# Patient Record
Sex: FEMALE | Race: BLACK OR AFRICAN AMERICAN | NOT HISPANIC OR LATINO | Employment: UNEMPLOYED | URBAN - METROPOLITAN AREA
[De-identification: names, ages, dates, MRNs, and addresses within clinical notes are randomized per-mention and may not be internally consistent; named-entity substitution may affect disease eponyms.]

---

## 2017-08-11 ENCOUNTER — HOSPITAL ENCOUNTER (EMERGENCY)
Facility: OTHER | Age: 57
Discharge: HOME | End: 2017-08-11
Attending: EMERGENCY MEDICINE
Payer: MEDICAID

## 2017-08-11 VITALS
TEMPERATURE: 98 F | BODY MASS INDEX: 48.82 KG/M2 | DIASTOLIC BLOOD PRESSURE: 66 MMHG | RESPIRATION RATE: 14 BRPM | OXYGEN SATURATION: 99 % | WEIGHT: 293 LBS | HEIGHT: 65 IN | SYSTOLIC BLOOD PRESSURE: 153 MMHG | HEART RATE: 72 BPM

## 2017-08-11 DIAGNOSIS — S46.912A SHOULDER STRAIN, LEFT, INITIAL ENCOUNTER: Primary | ICD-10-CM

## 2017-08-11 DIAGNOSIS — I48.91 ATRIAL FIBRILLATION, UNSPECIFIED TYPE: ICD-10-CM

## 2017-08-11 DIAGNOSIS — M25.519 SHOULDER PAIN: ICD-10-CM

## 2017-08-11 PROCEDURE — 99284 EMERGENCY DEPT VISIT MOD MDM: CPT | Mod: 25

## 2017-08-11 PROCEDURE — 93005 ELECTROCARDIOGRAM TRACING: CPT

## 2017-08-11 PROCEDURE — 93010 ELECTROCARDIOGRAM REPORT: CPT | Mod: ,,, | Performed by: INTERNAL MEDICINE

## 2017-08-12 NOTE — ED PROVIDER NOTES
Encounter Date: 8/11/2017       History     Chief Complaint   Patient presents with    Shoulder Pain     c/o left shoulder pain radiation to back x1 day, reports picking heavy box up yesterday, pain started after such, denies SOB, nausea or vomiting or any other symptoms      Patient is 56-year-old female who presents with complaints of left shoulder pain isn't present for 2 days prior to arrival.  She reports pain radiates to her back and is worse with movements.  She reports pain started when she picked up a heavy box yesterday.  She denies associated shortness of breath or palpitations.  She has no radiation of pain to her anterior chest or otherwise.  She has not been taking any medications to help with her symptoms.  She denies specific trauma or injury.  She is currently unaccompanied here in the ER.          Review of patient's allergies indicates:   Allergen Reactions    Morphine Itching     Past Medical History:   Diagnosis Date    Depression     GERD (gastroesophageal reflux disease)     Hypertension     Medical history non-contributory      Past Surgical History:   Procedure Laterality Date    FINGER SURGERY Left     index fracture repair    KNEE ARTHROSCOPY      SHOULDER ARTHROSCOPY      SHOULDER SURGERY       Family History   Problem Relation Age of Onset    Stroke Mother      Social History   Substance Use Topics    Smoking status: Never Smoker    Smokeless tobacco: Never Used    Alcohol use No     Review of Systems   Constitutional: Negative for fever.   HENT: Negative for sore throat.    Respiratory: Negative for shortness of breath.    Cardiovascular: Negative for chest pain.   Gastrointestinal: Negative for nausea.   Genitourinary: Negative for dysuria.   Musculoskeletal: Negative for back pain.        Left shoulder pain    Skin: Negative for rash.   Neurological: Negative for weakness.   Hematological: Does not bruise/bleed easily.       Physical Exam     Initial Vitals [08/11/17  1909]   BP Pulse Resp Temp SpO2   135/60 95 14 98.3 °F (36.8 °C) 99 %      MAP       85         Physical Exam    Nursing note and vitals reviewed.  Constitutional: She appears well-developed and well-nourished. No distress.   Obese -American female in no acute distress or apparent pain.  She makes good eye contact, speaks in clear full sentences and ambulates with ease.   HENT:   Head: Normocephalic and atraumatic.   Eyes: Conjunctivae and EOM are normal. Pupils are equal, round, and reactive to light. Right eye exhibits no discharge. Left eye exhibits no discharge. No scleral icterus.   Neck: Normal range of motion.   Cardiovascular: Normal rate and normal heart sounds. Exam reveals no gallop and no friction rub.    No murmur heard.  Regularly irregular rhythm.   Pulmonary/Chest: Breath sounds normal. She has no wheezes. She has no rhonchi. She has no rales.   Abdominal: Soft. Bowel sounds are normal. There is no tenderness. There is no rebound and no guarding.   Musculoskeletal: Normal range of motion. She exhibits tenderness. She exhibits no edema.   There is left trapezius and cervical paraspinal musculature TTP with no Cervical midline bony tenderness crepitus or step-offs.    Lymphadenopathy:     She has no cervical adenopathy.   Neurological: She is alert and oriented to person, place, and time. She has normal strength. No cranial nerve deficit or sensory deficit.   Skin: Skin is warm. Capillary refill takes less than 2 seconds. No rash and no abscess noted. No erythema.   Psychiatric: She has a normal mood and affect. Her behavior is normal. Thought content normal.         ED Course   Procedures  Labs Reviewed - No data to display  EKG Readings: (Independently Interpreted)   Initial Reading: No STEMI. Previous EKG: Compared with most recent EKG Previous EKG Date: 2016. Rhythm: Atrial Fibrillation. Heart Rate: 83. Ectopy: No Ectopy. ST Segments: Normal ST Segments. T Waves: Normal. Axis: Normal.      Imaging Results          X-Ray Shoulder Trauma Left (Final result)  Result time 08/11/17 21:46:30    Final result by Annika Rizvi MD (08/11/17 21:46:30)                 Impression:      1.  No acute displaced fracture or dislocation of the left shoulder.      Electronically signed by: ANNIKA RIZVI MD  Date:     08/11/17  Time:    21:46              Narrative:    Shoulder trauma three-view    Clinical history: Pain and unspecified shoulder    Comparison: None    Findings:  3 views.    The left humeral head maintains anatomic relationship with the glenoid.  The acromioclavicular joint is intact noting degenerative changes.  No acute displaced fracture or dislocation of the shoulder.  No acute displaced left rib fracture.  The left lung zones are grossly clear.                                 Medical Decision Making:   ED Management:  Urgent evaluation of 56-year-old female who presents with complaints of left shoulder strain.  She is afebrile, nontoxic appearing, hemodynamically stable.  Physical exam reveals soft tissue tenderness to palpation with reproduction of pain on internal and external rotation.  There is no overlying skin changes and x-ray reveals no acute osseous process. EKG does reveal atrial fibrillation which we have not seen on prior EKG studies.  Patient tells me that she was recently diagnosed with atrial fibrillation by her primary care provider approximately 3 months ago.  She has seen a cardiologist in the last 2 months and was placed on aspirin therapy.  She has been compliant with all of her cardiologist recommendations and she reports no chest pain shortness of breath or palpitations associated with shoulder pain.  I do not feel that the shoulder pain is related to an acute cardiac pathology and I do not feel that urgent  Cardiology consult to further workup is warranted at this time.  We'll encourage patient to take her Mobitc which she is previously prescribed for arthritis for  the next few days and will provide some laying for comfort.  She is educated extensively on the importance of ranging the shoulder 4 times a day in order to prevent adhesive capsulitis.  She is encouraged to follow-up with orthopedic one to 2 days for symptom recheck.  She is amenable to plan.  Case discussed with attending who agrees with plan.  Other:   I have discussed this case with another health care provider.       <> Summary of the Discussion: Francisco J                   ED Course     Clinical Impression:   The primary encounter diagnosis was Shoulder strain, left, initial encounter. Diagnoses of Shoulder pain and Atrial fibrillation, unspecified type were also pertinent to this visit.                           Farrah Farmer PA-C  08/11/17 [id number]

## 2017-08-12 NOTE — ED NOTES
"Pt has pain to left shoulder starting yesterday, Pain is described as "achy" and radiates from shoulder blade and wraps around to front under breast. Pt states that she did fall and "twist herself" about 2 weeks ago that may be where she injured herself.  Pt has no decrease in ROM and shoulder blade is tender to palpation.  "

## 2021-01-16 ENCOUNTER — HOSPITAL ENCOUNTER (EMERGENCY)
Facility: OTHER | Age: 61
Discharge: HOME | End: 2021-01-16
Attending: EMERGENCY MEDICINE
Payer: MEDICAID

## 2021-01-16 VITALS
OXYGEN SATURATION: 98 % | RESPIRATION RATE: 16 BRPM | SYSTOLIC BLOOD PRESSURE: 164 MMHG | TEMPERATURE: 98 F | HEART RATE: 70 BPM | BODY MASS INDEX: 44.98 KG/M2 | DIASTOLIC BLOOD PRESSURE: 72 MMHG | HEIGHT: 65 IN | WEIGHT: 270 LBS

## 2021-01-16 DIAGNOSIS — Z87.19 HISTORY OF ESOPHAGITIS: ICD-10-CM

## 2021-01-16 DIAGNOSIS — R03.0 ELEVATED BLOOD PRESSURE READING: ICD-10-CM

## 2021-01-16 DIAGNOSIS — R07.89 CHEST TIGHTNESS: Primary | ICD-10-CM

## 2021-01-16 LAB
ALBUMIN SERPL BCP-MCNC: 3.5 G/DL (ref 3.5–5.2)
ALP SERPL-CCNC: 137 U/L (ref 55–135)
ALT SERPL W/O P-5'-P-CCNC: 15 U/L (ref 10–44)
ANION GAP SERPL CALC-SCNC: 10 MMOL/L (ref 8–16)
AST SERPL-CCNC: 25 U/L (ref 10–40)
BASOPHILS # BLD AUTO: 0.03 K/UL (ref 0–0.2)
BASOPHILS NFR BLD: 0.7 % (ref 0–1.9)
BILIRUB SERPL-MCNC: 0.7 MG/DL (ref 0.1–1)
BUN SERPL-MCNC: 9 MG/DL (ref 8–23)
CALCIUM SERPL-MCNC: 9 MG/DL (ref 8.7–10.5)
CHLORIDE SERPL-SCNC: 107 MMOL/L (ref 95–110)
CO2 SERPL-SCNC: 26 MMOL/L (ref 23–29)
CREAT SERPL-MCNC: 1.1 MG/DL (ref 0.5–1.4)
DIFFERENTIAL METHOD: ABNORMAL
EOSINOPHIL # BLD AUTO: 0.1 K/UL (ref 0–0.5)
EOSINOPHIL NFR BLD: 2.2 % (ref 0–8)
ERYTHROCYTE [DISTWIDTH] IN BLOOD BY AUTOMATED COUNT: 14.7 % (ref 11.5–14.5)
EST. GFR  (AFRICAN AMERICAN): >60 ML/MIN/1.73 M^2
EST. GFR  (NON AFRICAN AMERICAN): 54 ML/MIN/1.73 M^2
GLUCOSE SERPL-MCNC: 89 MG/DL (ref 70–110)
HCT VFR BLD AUTO: 39.3 % (ref 37–48.5)
HCV AB SERPL QL IA: NEGATIVE
HGB BLD-MCNC: 12.1 G/DL (ref 12–16)
HIV 1+2 AB+HIV1 P24 AG SERPL QL IA: NEGATIVE
IMM GRANULOCYTES # BLD AUTO: 0.01 K/UL (ref 0–0.04)
IMM GRANULOCYTES NFR BLD AUTO: 0.2 % (ref 0–0.5)
LYMPHOCYTES # BLD AUTO: 1.8 K/UL (ref 1–4.8)
LYMPHOCYTES NFR BLD: 45 % (ref 18–48)
MCH RBC QN AUTO: 23.1 PG (ref 27–31)
MCHC RBC AUTO-ENTMCNC: 30.8 G/DL (ref 32–36)
MCV RBC AUTO: 75 FL (ref 82–98)
MONOCYTES # BLD AUTO: 0.4 K/UL (ref 0.3–1)
MONOCYTES NFR BLD: 10.3 % (ref 4–15)
NEUTROPHILS # BLD AUTO: 1.7 K/UL (ref 1.8–7.7)
NEUTROPHILS NFR BLD: 41.6 % (ref 38–73)
NRBC BLD-RTO: 0 /100 WBC
PLATELET # BLD AUTO: 221 K/UL (ref 150–350)
PMV BLD AUTO: 10.2 FL (ref 9.2–12.9)
POTASSIUM SERPL-SCNC: 4.1 MMOL/L (ref 3.5–5.1)
PROT SERPL-MCNC: 7.1 G/DL (ref 6–8.4)
RBC # BLD AUTO: 5.23 M/UL (ref 4–5.4)
SODIUM SERPL-SCNC: 143 MMOL/L (ref 136–145)
TROPONIN I SERPL DL<=0.01 NG/ML-MCNC: <0.006 NG/ML (ref 0–0.03)
TROPONIN I SERPL DL<=0.01 NG/ML-MCNC: <0.006 NG/ML (ref 0–0.03)
WBC # BLD AUTO: 4.07 K/UL (ref 3.9–12.7)

## 2021-01-16 PROCEDURE — 86803 HEPATITIS C AB TEST: CPT

## 2021-01-16 PROCEDURE — 93010 EKG 12-LEAD: ICD-10-PCS | Mod: ,,, | Performed by: INTERNAL MEDICINE

## 2021-01-16 PROCEDURE — 93005 ELECTROCARDIOGRAM TRACING: CPT

## 2021-01-16 PROCEDURE — 86703 HIV-1/HIV-2 1 RESULT ANTBDY: CPT

## 2021-01-16 PROCEDURE — 85025 COMPLETE CBC W/AUTO DIFF WBC: CPT

## 2021-01-16 PROCEDURE — 93010 ELECTROCARDIOGRAM REPORT: CPT | Mod: ,,, | Performed by: INTERNAL MEDICINE

## 2021-01-16 PROCEDURE — 99285 EMERGENCY DEPT VISIT HI MDM: CPT | Mod: 25

## 2021-01-16 PROCEDURE — 80053 COMPREHEN METABOLIC PANEL: CPT

## 2021-01-16 PROCEDURE — 84484 ASSAY OF TROPONIN QUANT: CPT | Mod: 91

## 2021-01-16 RX ORDER — OMEGA-3-ACID ETHYL ESTERS 1 G/1
2 CAPSULE, LIQUID FILLED ORAL 2 TIMES DAILY
Status: ON HOLD | COMMUNITY
End: 2023-05-10

## 2021-01-16 RX ORDER — METOPROLOL SUCCINATE 25 MG/1
25 TABLET, EXTENDED RELEASE ORAL DAILY
COMMUNITY
End: 2022-08-16

## 2021-01-16 RX ORDER — ERGOCALCIFEROL 1.25 MG/1
50000 CAPSULE ORAL
COMMUNITY

## 2022-01-24 DIAGNOSIS — I50.42 CHRONIC COMBINED SYSTOLIC AND DIASTOLIC HEART FAILURE: Primary | ICD-10-CM

## 2022-02-11 ENCOUNTER — HOSPITAL ENCOUNTER (OUTPATIENT)
Dept: CARDIOLOGY | Facility: HOSPITAL | Age: 62
Discharge: HOME | End: 2022-02-11
Attending: INTERNAL MEDICINE
Payer: MEDICAID

## 2022-02-11 VITALS
DIASTOLIC BLOOD PRESSURE: 85 MMHG | WEIGHT: 270 LBS | BODY MASS INDEX: 44.98 KG/M2 | SYSTOLIC BLOOD PRESSURE: 130 MMHG | HEART RATE: 75 BPM | HEIGHT: 65 IN

## 2022-02-11 DIAGNOSIS — I50.42 CHRONIC COMBINED SYSTOLIC AND DIASTOLIC HEART FAILURE: ICD-10-CM

## 2022-02-11 PROBLEM — I10 HYPERTENSION: Status: ACTIVE | Noted: 2022-02-11

## 2022-02-11 PROBLEM — I48.0 PAROXYSMAL ATRIAL FIBRILLATION: Status: ACTIVE | Noted: 2022-02-11

## 2022-02-11 PROBLEM — E66.01 MORBID OBESITY: Status: ACTIVE | Noted: 2022-02-11

## 2022-02-11 PROBLEM — I34.0 NONRHEUMATIC MITRAL VALVE REGURGITATION: Status: ACTIVE | Noted: 2022-02-11

## 2022-02-11 PROBLEM — E78.5 HYPERLIPIDEMIA: Status: ACTIVE | Noted: 2022-02-11

## 2022-02-11 LAB
ASCENDING AORTA: 3.42 CM
AV INDEX (PROSTH): 0.91
AV MEAN GRADIENT: 2 MMHG
AV PEAK GRADIENT: 4 MMHG
AV VALVE AREA: 2.87 CM2
AV VELOCITY RATIO: 0.83
BSA FOR ECHO PROCEDURE: 2.37 M2
CV ECHO LV RWT: 0.35 CM
DOP CALC AO PEAK VEL: 1 M/S
DOP CALC AO VTI: 18.62 CM
DOP CALC LVOT AREA: 3.1 CM2
DOP CALC LVOT DIAMETER: 2 CM
DOP CALC LVOT PEAK VEL: 0.83 M/S
DOP CALC LVOT STROKE VOLUME: 53.44 CM3
DOP CALCLVOT PEAK VEL VTI: 17.02 CM
E/A RATIO: 2.33
E/E' RATIO: 18.11 M/S
ECHO LV POSTERIOR WALL: 0.95 CM (ref 0.6–1.1)
EJECTION FRACTION: 60 %
FRACTIONAL SHORTENING: 29 % (ref 28–44)
INTERVENTRICULAR SEPTUM: 0.99 CM (ref 0.6–1.1)
LA MAJOR: 8.79 CM
LA MINOR: 7.96 CM
LA WIDTH: 5.71 CM
LEFT ATRIUM SIZE: 6.6 CM
LEFT ATRIUM VOLUME INDEX MOD: 129.8 ML/M2
LEFT ATRIUM VOLUME INDEX: 118.9 ML/M2
LEFT ATRIUM VOLUME MOD: 292 CM3
LEFT ATRIUM VOLUME: 267.62 CM3
LEFT INTERNAL DIMENSION IN SYSTOLE: 3.88 CM (ref 2.1–4)
LEFT VENTRICLE DIASTOLIC VOLUME INDEX: 74.18 ML/M2
LEFT VENTRICLE DIASTOLIC VOLUME: 166.9 ML
LEFT VENTRICLE MASS INDEX: 91 G/M2
LEFT VENTRICLE SYSTOLIC VOLUME INDEX: 28.9 ML/M2
LEFT VENTRICLE SYSTOLIC VOLUME: 64.95 ML
LEFT VENTRICULAR INTERNAL DIMENSION IN DIASTOLE: 5.5 CM (ref 3.5–6)
LEFT VENTRICULAR MASS: 204.82 G
LV LATERAL E/E' RATIO: 16.3 M/S
LV SEPTAL E/E' RATIO: 20.38 M/S
MV PEAK A VEL: 0.7 M/S
MV PEAK E VEL: 1.63 M/S
PISA TR MAX VEL: 3.44 M/S
RA MAJOR: 7.31 CM
RA PRESSURE: 3 MMHG
RA WIDTH: 4.93 CM
RIGHT ATRIAL AREA: 25 CM2
RIGHT VENTRICULAR END-DIASTOLIC DIMENSION: 5.06 CM
RV TISSUE DOPPLER FREE WALL SYSTOLIC VELOCITY 1 (APICAL 4 CHAMBER VIEW): 8.99 CM/S
SINUS: 3.12 CM
STJ: 3.02 CM
TDI LATERAL: 0.1 M/S
TDI SEPTAL: 0.08 M/S
TDI: 0.09 M/S
TR MAX PG: 47 MMHG
TRICUSPID ANNULAR PLANE SYSTOLIC EXCURSION: 1.88 CM
TV REST PULMONARY ARTERY PRESSURE: 50 MMHG

## 2022-02-11 PROCEDURE — 93306 TTE W/DOPPLER COMPLETE: CPT

## 2022-02-11 PROCEDURE — 93306 ECHO (CUPID ONLY): ICD-10-PCS | Mod: 26,,, | Performed by: INTERNAL MEDICINE

## 2022-02-11 PROCEDURE — 93306 TTE W/DOPPLER COMPLETE: CPT | Mod: 26,,, | Performed by: INTERNAL MEDICINE

## 2022-02-11 NOTE — PROGRESS NOTES
PCP - Yonathan Brewer MD  Subjective:   Patient ID:  Magalys Nguyen is a 61 y.o. female who presents for  Evaluation of Mitral Valve Regurgitation.     Referring provider: Reese Edwards MD    HPI: Magalys Nguyen is a 61 y.o. F with chronic diastolic HF (EF 60%), pAF (on Eliquis), HTN, HLD, obesity s/p gastric sleeve (2017) who is referred for evaluation of severe MR. Patient is doing well on today's visit. She has known about her mitral valve disease for some time. She reports shortness of breath only while climbing stairs otherwise denies shortness of breath while ambulating on level ground. Does have peripheral edema. Further denies orthopnea, PND, palpitations, near syncope, syncope. She has never been hospitalized for CHF exacerbation. She exercises with foot pedal ~20 minutes per day. Does not routinely exercise otherwise. Wants to see Bariatrics to discuss possibility of re-do surgery.     Per chart review patient underwent CASTILLO in 2018 (prior to cardioversion for AF) which showed normal LVEF 55% and moderate to severe MR. She had 2D echo today with findings below.    2D echo 2/15/22: per report:  · The estimated ejection fraction is 60%.  · The left ventricle is normal in size with normal systolic function.  · Grade III left ventricular diastolic dysfunction.  · Normal right ventricular size with normal right ventricular systolic function.  · Severe left atrial enlargement.  · Moderate right atrial enlargement.  · Severe mitral regurgitation.  · Moderate to severe tricuspid regurgitation.  · There is pulmonary hypertension.  · The estimated PA systolic pressure is 50 mmHg.  · Normal central venous pressure (3 mmHg).       Magalys Nguyen is a 61 y.o. female referred by Dr Edwards for evaluation of severe MR (NYHA Class II sx).    Mitral Valve Disease Etiology: Functional Mitral Regurgitation (Pure annular dilation (w/normal LV systolic function))    The patient has undergone the following MitraClip  work-up:    CASTILLO (Date 1/31/22 ): CASTILLO from [facility] reviewed by Radhames Perdomo and Erick - shows moderate central MR with dilated annulus   LHC (Date ): Needs    STS: 1.7%    Frailty: 1/4    CT Surgery risk assessment: Needs   Comorbidities: HFpEF, pAF, HTN, HLD, obesity    Labs: Hgb 12.1, Cr 1.1,         History:     Past Medical History:   Diagnosis Date    Anticoagulant long-term use     Depression     GERD (gastroesophageal reflux disease)     Hypertension     Medical history non-contributory     Paroxysmal A-fib      Past Surgical History:   Procedure Laterality Date    FINGER SURGERY Left     index fracture repair    GALLBLADDER SURGERY  08/2021    KNEE ARTHROSCOPY      SHOULDER ARTHROSCOPY      SHOULDER SURGERY       Social History     Tobacco Use    Smoking status: Never Smoker    Smokeless tobacco: Never Used   Substance Use Topics    Alcohol use: No     Family History   Problem Relation Age of Onset    Stroke Mother        Meds:     Review of patient's allergies indicates:   Allergen Reactions    Morphine Itching       Current Outpatient Medications:     amlodipine (NORVASC) 5 MG tablet, Take 5 mg by mouth every evening. , Disp: , Rfl: 2    apixaban (ELIQUIS) 5 mg Tab, Take 5 mg by mouth 2 (two) times daily., Disp: , Rfl:     benazepril (LOTENSIN) 40 MG tablet, Take 40 mg by mouth every evening. , Disp: , Rfl:     ergocalciferol (ERGOCALCIFEROL) 50,000 unit Cap, Take 50,000 Units by mouth every 7 days., Disp: , Rfl:     fluticasone propionate (FLONASE) 50 mcg/actuation nasal spray, 1 spray by Each Nostril route once daily., Disp: , Rfl:     furosemide (LASIX) 20 MG tablet, Take 20 mg by mouth daily as needed., Disp: , Rfl:     hydrochlorothiazide (HYDRODIURIL) 25 MG tablet, Take 25 mg by mouth every evening. , Disp: , Rfl:     meloxicam (MOBIC) 15 MG tablet, Take 15 mg by mouth once daily., Disp: , Rfl:     metoprolol succinate (TOPROL-XL) 25 MG 24 hr tablet, Take 25 mg by  "mouth once daily., Disp: , Rfl:     omeprazole (PRILOSEC) 40 MG capsule, Take 1 capsule (40 mg total) by mouth every morning., Disp: 14 capsule, Rfl: 0    potassium chloride SA (K-DUR,KLOR-CON) 20 MEQ tablet, Take 20 mEq by mouth once daily., Disp: , Rfl:     aspirin (ECOTRIN) 81 MG EC tablet, Take 81 mg by mouth every evening., Disp: , Rfl:     famotidine (PEPCID) 20 MG tablet, Take 1 tablet (20 mg total) by mouth 2 (two) times daily. (Patient not taking: Reported on 2/15/2022), Disp: 28 tablet, Rfl: 0    omega-3 acid ethyl esters (LOVAZA) 1 gram capsule, Take 2 g by mouth 2 (two) times daily., Disp: , Rfl:       Review of Systems   Constitutional: Negative for chills, diaphoresis, fever, malaise/fatigue and weight loss.   HENT: Negative for nosebleeds and sinus pain.    Respiratory: Negative for cough, sputum production and shortness of breath.    Cardiovascular: Positive for leg swelling. Negative for chest pain, palpitations, orthopnea, claudication and PND.   Gastrointestinal: Negative for abdominal pain, blood in stool, constipation, diarrhea, nausea and vomiting.   Genitourinary: Negative for dysuria, frequency, hematuria and urgency.   Neurological: Negative for dizziness, loss of consciousness and weakness.   Psychiatric/Behavioral: Negative for depression. The patient is not nervous/anxious.        Objective:   BP (!) 142/90 (BP Location: Left arm, Patient Position: Sitting, BP Method: Large (Manual))   Pulse 70   Ht 5' 5" (1.651 m)   Wt 132.2 kg (291 lb 7.2 oz)   LMP 08/01/2014   SpO2 100%   BMI 48.50 kg/m²   Physical Exam  Constitutional:       General: She is not in acute distress.  HENT:      Head: Normocephalic and atraumatic.      Mouth/Throat:      Mouth: Oropharynx is clear and moist.   Eyes:      Extraocular Movements: EOM normal.      Conjunctiva/sclera: Conjunctivae normal.      Pupils: Pupils are equal, round, and reactive to light.   Neck:      Vascular: No JVD.   Cardiovascular:    "   Rate and Rhythm: Normal rate. Rhythm irregularly irregular.      Heart sounds: Murmur heard.   No friction rub. No gallop.    Pulmonary:      Effort: Pulmonary effort is normal. No respiratory distress.      Breath sounds: Normal breath sounds. No wheezing or rales.   Chest:      Chest wall: No tenderness.   Abdominal:      General: Bowel sounds are normal. There is no distension.      Palpations: Abdomen is soft.      Tenderness: There is no abdominal tenderness.   Musculoskeletal:         General: Normal range of motion.      Cervical back: Normal range of motion and neck supple.      Right lower leg: Edema present.      Left lower leg: Edema present.   Skin:     General: Skin is warm and dry.      Findings: No erythema.   Neurological:      Mental Status: She is alert and oriented to person, place, and time.   Psychiatric:         Mood and Affect: Mood and affect normal.         Cognition and Memory: Memory normal.         Judgment: Judgment normal.         Labs:     Lab Results   Component Value Date     02/15/2022    K 3.9 02/15/2022     02/15/2022    CO2 28 02/15/2022    BUN 11 02/15/2022    CREATININE 0.9 02/15/2022    ANIONGAP 7 (L) 02/15/2022     Lab Results   Component Value Date    HGBA1C 5.3 03/04/2020     Lab Results   Component Value Date     (H) 02/15/2022       Lab Results   Component Value Date    WBC 2.80 (L) 02/15/2022    HGB 11.4 (L) 02/15/2022    HCT 37.6 02/15/2022     02/15/2022    GRAN 1.3 (L) 02/15/2022    GRAN 45.3 02/15/2022     Lab Results   Component Value Date    CHOL 181 02/15/2022    HDL 60 02/15/2022    LDLCALC 98.8 02/15/2022    TRIG 111 02/15/2022       Lab Results   Component Value Date     02/15/2022    K 3.9 02/15/2022     02/15/2022    CO2 28 02/15/2022    BUN 11 02/15/2022    CREATININE 0.9 02/15/2022    ANIONGAP 7 (L) 02/15/2022     Lab Results   Component Value Date    HGBA1C 5.3 03/04/2020     Lab Results   Component Value Date    BNP  538 (H) 02/15/2022    Lab Results   Component Value Date    WBC 2.80 (L) 02/15/2022    HGB 11.4 (L) 02/15/2022    HCT 37.6 02/15/2022     02/15/2022    GRAN 1.3 (L) 02/15/2022    GRAN 45.3 02/15/2022     Lab Results   Component Value Date    CHOL 181 02/15/2022    HDL 60 02/15/2022    LDLCALC 98.8 02/15/2022    TRIG 111 02/15/2022            Cardiovascular Imaging:     CASTILLO 6/3/2018: per report:  Results:   Chamber sizes revealed Left Atrial Enlargment.   Mitral valve leaflets are thickened, with restricted posterior   Left ventricle reveals normal contractility.   Left ventricle ejection fraction is 55%.   Right ventricular function is normal.   Doppler reveals:   Moderate TR.   Moderate to severe Mitral Regurgitation was noted.   There was no pericardial effusion seen.   There was no significant visible echo contrast noted in left atrium and   left Atrial Appendage. Velocities in left atrial appendage were normal.   Successful Cardioversion to NSR was done.     CONCLUSION     Successful Cardioversion to NSR was done.     Moderate to Severe Mitral Regurgitation.         Assessment & Plan:     1- Nonrheumatic mitral valve regurgitation      Magalys Nguyen is a 61 y.o. female referred by Dr Edwards for evaluation of severe MR (NYHA Class II sx).    Mitral Valve Disease Etiology: Functional Mitral Regurgitation (Pure annular dilation (w/normal LV systolic function))    The patient has undergone the following MitraClip work-up:    CASTILLO (Date 1/31/22 ): [facility]ILLO from [facility] reviewed by Radhames Perdomo and Erick - shows moderate central MR with dilated annulus   LHC (Date ): Needs    STS: 1.7%    Frailty: 1/4    CT Surgery risk assessment: Needs   Comorbidities: HFpEF, pAF, HTN, HLD, obesity    Labs: Hgb 12.1, Cr 1.1,     Plan:  1. CTS referral to assess candidacy for mitral valve surgery.   2. Optimization of medical therapy as per Dr Matute of [facility],  seen by her today. Also r/o amyloidosis due to concerns for  restrictive heart disease on echo as per Dr Matute, appreciate assistance.  3. Obtain 48-hour Holter to assess arrhythmia burden and heart rate.   4. Will follow up with patient to determine candidacy for mitral valve clip procedure pending above w/ repeat TTE.     2- Chronic diastolic heart failure  - Patient reports NYHA class II symptoms. Continue medical therapy aimed at blood pressure control. Low sodium diet.     3- Atrial fibrillation  - Patient in atrial fibrillation today. [id number] 3. Continue Eliquis for CVA ppx. Will obtain 48-hour Holter to assess arrhythmia burden and assess heart rate trend.    4- Hypertension  - BP controlled. Continue current antihypertensive regimen.     5- Hyperlipidemia  - Continue lipid lowering therapy.     6- Morbid obesity  - Body mass index is 48.5 kg/m². Weight loss through diet and exercise.       RTC 1 month w/ repeat TTE    Discussed with Dr Perdomo    Signed:  Diana Fernandez M.D.  Interventional Cardiology Fellow PGY-7  [facility]           Interventional Cardiology Staff  I have personally taken the history and examined this patient. I have discussed and agree with the resident's findings and plan as documented in the resident's note.  Her 61-year-old, referred by Dr. Edwards to evaluate her severe mitral regurgitation.  Her transesophageal echo performed at outside hospital and transthoracic echo performed today demonstrates severe biatrial dilatation and enlargement severe mitral regurgitation and normal left and right ventricular systolic function.  She is symptomatic when walking 1 flight of stairs is on beta-blocker, Ace inhibitor and diuretics and has been seen by Dr. Mautte today who has recommended workup for restrictive disease.  I have ordered a 48 hour Holter monitor and surgical evaluation and will see her back in 1 month.  Her the as far as her mitral leaflets are concerned they are amendable to EVANGELISTA.    Isauro Perdomo

## 2022-02-15 ENCOUNTER — OFFICE VISIT (OUTPATIENT)
Dept: CARDIOLOGY | Facility: CLINIC | Age: 62
End: 2022-02-15
Payer: MEDICAID

## 2022-02-15 ENCOUNTER — OFFICE VISIT (OUTPATIENT)
Dept: TRANSPLANT | Facility: CLINIC | Age: 62
End: 2022-02-15
Payer: MEDICAID

## 2022-02-15 ENCOUNTER — LAB VISIT (OUTPATIENT)
Dept: LAB | Facility: HOSPITAL | Age: 62
End: 2022-02-15
Attending: INTERNAL MEDICINE
Payer: MEDICAID

## 2022-02-15 VITALS
BODY MASS INDEX: 48.63 KG/M2 | HEIGHT: 65 IN | HEART RATE: 64 BPM | WEIGHT: 291.88 LBS | SYSTOLIC BLOOD PRESSURE: 136 MMHG | DIASTOLIC BLOOD PRESSURE: 77 MMHG

## 2022-02-15 VITALS
HEART RATE: 70 BPM | WEIGHT: 291.44 LBS | BODY MASS INDEX: 48.55 KG/M2 | DIASTOLIC BLOOD PRESSURE: 90 MMHG | SYSTOLIC BLOOD PRESSURE: 142 MMHG | HEIGHT: 65 IN | OXYGEN SATURATION: 100 %

## 2022-02-15 DIAGNOSIS — E66.01 MORBID OBESITY: ICD-10-CM

## 2022-02-15 DIAGNOSIS — I50.32 CHRONIC DIASTOLIC HEART FAILURE: ICD-10-CM

## 2022-02-15 DIAGNOSIS — I10 HYPERTENSION, UNSPECIFIED TYPE: ICD-10-CM

## 2022-02-15 DIAGNOSIS — I48.0 PAROXYSMAL ATRIAL FIBRILLATION: ICD-10-CM

## 2022-02-15 DIAGNOSIS — E78.5 HYPERLIPIDEMIA, UNSPECIFIED HYPERLIPIDEMIA TYPE: ICD-10-CM

## 2022-02-15 DIAGNOSIS — I50.42 CHRONIC COMBINED SYSTOLIC AND DIASTOLIC HEART FAILURE: ICD-10-CM

## 2022-02-15 DIAGNOSIS — I34.0 NONRHEUMATIC MITRAL VALVE REGURGITATION: ICD-10-CM

## 2022-02-15 DIAGNOSIS — I34.0 NONRHEUMATIC MITRAL VALVE REGURGITATION: Primary | ICD-10-CM

## 2022-02-15 DIAGNOSIS — I36.1 NONRHEUMATIC TRICUSPID VALVE REGURGITATION: ICD-10-CM

## 2022-02-15 DIAGNOSIS — I34.0 SEVERE MITRAL REGURGITATION: Primary | ICD-10-CM

## 2022-02-15 DIAGNOSIS — I51.89 DIASTOLIC DYSFUNCTION: ICD-10-CM

## 2022-02-15 LAB
ALBUMIN SERPL BCP-MCNC: 3.2 G/DL (ref 3.5–5.2)
ALP SERPL-CCNC: 131 U/L (ref 55–135)
ALT SERPL W/O P-5'-P-CCNC: 19 U/L (ref 10–44)
ANION GAP SERPL CALC-SCNC: 7 MMOL/L (ref 8–16)
AST SERPL-CCNC: 20 U/L (ref 10–40)
BASOPHILS # BLD AUTO: 0.04 K/UL (ref 0–0.2)
BASOPHILS NFR BLD: 1.4 % (ref 0–1.9)
BILIRUB SERPL-MCNC: 1 MG/DL (ref 0.1–1)
BNP SERPL-MCNC: 538 PG/ML (ref 0–99)
BUN SERPL-MCNC: 11 MG/DL (ref 8–23)
CALCIUM SERPL-MCNC: 9.3 MG/DL (ref 8.7–10.5)
CHLORIDE SERPL-SCNC: 109 MMOL/L (ref 95–110)
CHOLEST SERPL-MCNC: 181 MG/DL (ref 120–199)
CHOLEST/HDLC SERPL: 3 {RATIO} (ref 2–5)
CO2 SERPL-SCNC: 28 MMOL/L (ref 23–29)
CREAT SERPL-MCNC: 0.9 MG/DL (ref 0.5–1.4)
DIFFERENTIAL METHOD: ABNORMAL
EOSINOPHIL # BLD AUTO: 0.1 K/UL (ref 0–0.5)
EOSINOPHIL NFR BLD: 3.9 % (ref 0–8)
ERYTHROCYTE [DISTWIDTH] IN BLOOD BY AUTOMATED COUNT: 15.5 % (ref 11.5–14.5)
EST. GFR  (AFRICAN AMERICAN): >60 ML/MIN/1.73 M^2
EST. GFR  (NON AFRICAN AMERICAN): >60 ML/MIN/1.73 M^2
GLUCOSE SERPL-MCNC: 92 MG/DL (ref 70–110)
HCT VFR BLD AUTO: 37.6 % (ref 37–48.5)
HDLC SERPL-MCNC: 60 MG/DL (ref 40–75)
HDLC SERPL: 33.1 % (ref 20–50)
HGB BLD-MCNC: 11.4 G/DL (ref 12–16)
IMM GRANULOCYTES # BLD AUTO: 0.01 K/UL (ref 0–0.04)
IMM GRANULOCYTES NFR BLD AUTO: 0.4 % (ref 0–0.5)
INR PPP: 1.1 (ref 0.8–1.2)
LDLC SERPL CALC-MCNC: 98.8 MG/DL (ref 63–159)
LYMPHOCYTES # BLD AUTO: 1 K/UL (ref 1–4.8)
LYMPHOCYTES NFR BLD: 36.1 % (ref 18–48)
MCH RBC QN AUTO: 22.9 PG (ref 27–31)
MCHC RBC AUTO-ENTMCNC: 30.3 G/DL (ref 32–36)
MCV RBC AUTO: 76 FL (ref 82–98)
MONOCYTES # BLD AUTO: 0.4 K/UL (ref 0.3–1)
MONOCYTES NFR BLD: 12.9 % (ref 4–15)
NEUTROPHILS # BLD AUTO: 1.3 K/UL (ref 1.8–7.7)
NEUTROPHILS NFR BLD: 45.3 % (ref 38–73)
NONHDLC SERPL-MCNC: 121 MG/DL
NRBC BLD-RTO: 0 /100 WBC
PLATELET # BLD AUTO: 191 K/UL (ref 150–450)
PMV BLD AUTO: 10.4 FL (ref 9.2–12.9)
POTASSIUM SERPL-SCNC: 3.9 MMOL/L (ref 3.5–5.1)
PROT SERPL-MCNC: 6.7 G/DL (ref 6–8.4)
PROTHROMBIN TIME: 11.1 SEC (ref 9–12.5)
RBC # BLD AUTO: 4.97 M/UL (ref 4–5.4)
SODIUM SERPL-SCNC: 144 MMOL/L (ref 136–145)
TRIGL SERPL-MCNC: 111 MG/DL (ref 30–150)
WBC # BLD AUTO: 2.8 K/UL (ref 3.9–12.7)

## 2022-02-15 PROCEDURE — 1160F RVW MEDS BY RX/DR IN RCRD: CPT | Mod: CPTII,,, | Performed by: INTERNAL MEDICINE

## 2022-02-15 PROCEDURE — 1159F MED LIST DOCD IN RCRD: CPT | Mod: CPTII,,, | Performed by: INTERNAL MEDICINE

## 2022-02-15 PROCEDURE — 3077F SYST BP >= 140 MM HG: CPT | Mod: CPTII,,, | Performed by: INTERNAL MEDICINE

## 2022-02-15 PROCEDURE — 1159F PR MEDICATION LIST DOCUMENTED IN MEDICAL RECORD: ICD-10-PCS | Mod: CPTII,,, | Performed by: INTERNAL MEDICINE

## 2022-02-15 PROCEDURE — 99205 PR OFFICE/OUTPT VISIT, NEW, LEVL V, 60-74 MIN: ICD-10-PCS | Mod: S$PBB,,, | Performed by: INTERNAL MEDICINE

## 2022-02-15 PROCEDURE — 3078F DIAST BP <80 MM HG: CPT | Mod: CPTII,,, | Performed by: INTERNAL MEDICINE

## 2022-02-15 PROCEDURE — 85610 PROTHROMBIN TIME: CPT | Performed by: INTERNAL MEDICINE

## 2022-02-15 PROCEDURE — 99205 OFFICE O/P NEW HI 60 MIN: CPT | Mod: S$PBB,,, | Performed by: INTERNAL MEDICINE

## 2022-02-15 PROCEDURE — 3008F BODY MASS INDEX DOCD: CPT | Mod: CPTII,,, | Performed by: INTERNAL MEDICINE

## 2022-02-15 PROCEDURE — 80061 LIPID PANEL: CPT | Performed by: INTERNAL MEDICINE

## 2022-02-15 PROCEDURE — 3075F SYST BP GE 130 - 139MM HG: CPT | Mod: CPTII,,, | Performed by: INTERNAL MEDICINE

## 2022-02-15 PROCEDURE — 99214 OFFICE O/P EST MOD 30 MIN: CPT | Mod: PBBFAC,27 | Performed by: INTERNAL MEDICINE

## 2022-02-15 PROCEDURE — 85025 COMPLETE CBC W/AUTO DIFF WBC: CPT | Performed by: INTERNAL MEDICINE

## 2022-02-15 PROCEDURE — 1160F PR REVIEW ALL MEDS BY PRESCRIBER/CLIN PHARMACIST DOCUMENTED: ICD-10-PCS | Mod: CPTII,,, | Performed by: INTERNAL MEDICINE

## 2022-02-15 PROCEDURE — 36415 COLL VENOUS BLD VENIPUNCTURE: CPT | Performed by: INTERNAL MEDICINE

## 2022-02-15 PROCEDURE — 99999 PR PBB SHADOW E&M-EST. PATIENT-LVL IV: CPT | Mod: PBBFAC,,, | Performed by: INTERNAL MEDICINE

## 2022-02-15 PROCEDURE — 3008F PR BODY MASS INDEX (BMI) DOCUMENTED: ICD-10-PCS | Mod: CPTII,,, | Performed by: INTERNAL MEDICINE

## 2022-02-15 PROCEDURE — 3080F DIAST BP >= 90 MM HG: CPT | Mod: CPTII,,, | Performed by: INTERNAL MEDICINE

## 2022-02-15 PROCEDURE — 3075F PR MOST RECENT SYSTOLIC BLOOD PRESS GE 130-139MM HG: ICD-10-PCS | Mod: CPTII,,, | Performed by: INTERNAL MEDICINE

## 2022-02-15 PROCEDURE — 99999 PR PBB SHADOW E&M-EST. PATIENT-LVL IV: ICD-10-PCS | Mod: PBBFAC,,, | Performed by: INTERNAL MEDICINE

## 2022-02-15 PROCEDURE — 80053 COMPREHEN METABOLIC PANEL: CPT | Performed by: INTERNAL MEDICINE

## 2022-02-15 PROCEDURE — 3080F PR MOST RECENT DIASTOLIC BLOOD PRESSURE >= 90 MM HG: ICD-10-PCS | Mod: CPTII,,, | Performed by: INTERNAL MEDICINE

## 2022-02-15 PROCEDURE — 3078F PR MOST RECENT DIASTOLIC BLOOD PRESSURE < 80 MM HG: ICD-10-PCS | Mod: CPTII,,, | Performed by: INTERNAL MEDICINE

## 2022-02-15 PROCEDURE — 99999 PR PBB SHADOW E&M-EST. PATIENT-LVL III: ICD-10-PCS | Mod: PBBFAC,,, | Performed by: INTERNAL MEDICINE

## 2022-02-15 PROCEDURE — 99999 PR PBB SHADOW E&M-EST. PATIENT-LVL III: CPT | Mod: PBBFAC,,, | Performed by: INTERNAL MEDICINE

## 2022-02-15 PROCEDURE — 83880 ASSAY OF NATRIURETIC PEPTIDE: CPT | Performed by: INTERNAL MEDICINE

## 2022-02-15 PROCEDURE — 3077F PR MOST RECENT SYSTOLIC BLOOD PRESSURE >= 140 MM HG: ICD-10-PCS | Mod: CPTII,,, | Performed by: INTERNAL MEDICINE

## 2022-02-15 PROCEDURE — 99213 OFFICE O/P EST LOW 20 MIN: CPT | Mod: PBBFAC | Performed by: INTERNAL MEDICINE

## 2022-02-15 RX ORDER — POTASSIUM CHLORIDE 20 MEQ/1
20 TABLET, EXTENDED RELEASE ORAL DAILY
Status: ON HOLD | COMMUNITY
End: 2022-08-16 | Stop reason: HOSPADM

## 2022-02-15 RX ORDER — MELOXICAM 15 MG/1
15 TABLET ORAL DAILY
Status: ON HOLD | COMMUNITY
Start: 2022-01-26 | End: 2022-04-13 | Stop reason: HOSPADM

## 2022-02-15 RX ORDER — FLUTICASONE PROPIONATE 50 MCG
1 SPRAY, SUSPENSION (ML) NASAL DAILY
COMMUNITY
Start: 2021-09-18

## 2022-02-15 RX ORDER — FUROSEMIDE 20 MG/1
20 TABLET ORAL DAILY PRN
Status: ON HOLD | COMMUNITY
Start: 2022-01-26 | End: 2022-08-16 | Stop reason: HOSPADM

## 2022-02-15 NOTE — PROGRESS NOTES
Subjective:   Initial evaluation of mitral valve disease    HPI:  Ms. Nguyen is a 61 y.o. year old Black or  female who has presents to be considered for mitral valve regurgitation. She is a 60 year old BF with AF, diastolic heart failure, GERD, s/p gastric sleeve but gained weight back again, severe MR, who presents for evaluation of her valve. She is scheduled for another sleeve which is what started this evaluation again. She is followed by Dr. Edwards from cardiology here in [place]. Had a stress test last year which was negative for ischemia. No chest pain, chest pressure, not walking as much as she used to over the last year. Talked about her valve issue before covid (the pandemic). Was considering referring back in 2020 for the valve. Review of the echo demosntrates significant diastolic dysfunction with significant biatrial enlargement. Sleeps on 1 pillow, sometimes two. No PND. Has broken her left foot, so swells in that one, but not typically. No snoring at night, feels tired sometimes but not fatigued during the day really. Not been evaluated for sleep apnea. About a year ago, was walking nearly 2 miles, but not walking anymore. With the afib had been found out to have valve issue, but feels she was pretty asymptomatic. NYHA FC II. Blood pressure stable at home, usually 110-120s systolic at home, but at the doctor feels always at the doctor. Thinks she can walk 1/4 mile without issue. Really feels well, does not feel short of breath with walking.     TTE today:  · The estimated ejection fraction is 60%.  · The left ventricle is normal in size with normal systolic function.  · Grade III left ventricular diastolic dysfunction.  · Normal right ventricular size with normal right ventricular systolic function.  · Severe left atrial enlargement.  · Moderate right atrial enlargement.  · Severe mitral regurgitation.  · Moderate to severe tricuspid regurgitation.  · There is pulmonary  "hypertension.  · The estimated PA systolic pressure is 50 mmHg.  · Normal central venous pressure (3 mmHg).      Past Medical History:   Diagnosis Date    Anticoagulant long-term use     Depression     GERD (gastroesophageal reflux disease)     Hypertension     Medical history non-contributory     Paroxysmal A-fib      Past Surgical History:   Procedure Laterality Date    FINGER SURGERY Left     index fracture repair    KNEE ARTHROSCOPY      SHOULDER ARTHROSCOPY      SHOULDER SURGERY         Family History   Problem Relation Age of Onset    Stroke Mother        Review of Systems   Constitutional: Negative for chills, decreased appetite, diaphoresis, fever, malaise/fatigue, weight gain and weight loss.   HENT: Negative for congestion.    Eyes: Negative for blurred vision and visual disturbance.   Cardiovascular: Positive for dyspnea on exertion and leg swelling. Negative for chest pain, irregular heartbeat, near-syncope, orthopnea, palpitations, paroxysmal nocturnal dyspnea and syncope.   Respiratory: Negative for cough, shortness of breath, sleep disturbances due to breathing, snoring and wheezing.    Hematologic/Lymphatic: Negative for bleeding problem. Does not bruise/bleed easily.   Skin: Negative for poor wound healing and rash.   Musculoskeletal: Negative for arthritis, joint pain and muscle weakness.   Gastrointestinal: Negative for bloating, abdominal pain, anorexia, constipation, diarrhea, hematemesis, hematochezia, melena, nausea and vomiting.   Genitourinary: Negative for frequency and urgency.   Neurological: Negative for difficulty with concentration, excessive daytime sleepiness, dizziness, headaches, light-headedness and weakness.   Psychiatric/Behavioral: Negative for depression. The patient does not have insomnia and is not nervous/anxious.        Objective:   Blood pressure 136/77, pulse 64, height 5' 5" (1.651 m), weight 132.4 kg (291 lb 14.2 oz), last menstrual period 08/01/2014.body " mass index is 48.57 kg/m².    Physical Exam  Vitals and nursing note reviewed.   Constitutional:       General: She is not in acute distress.     Appearance: She is well-developed and well-nourished. She is obese. She is not diaphoretic.   HENT:      Head: Normocephalic and atraumatic.      Mouth/Throat:      Mouth: Oropharynx is clear and moist.   Eyes:      General:         Right eye: No discharge.         Left eye: No discharge.      Extraocular Movements: EOM normal.      Conjunctiva/sclera: Conjunctivae normal.   Neck:      Vascular: No JVD.   Cardiovascular:      Rate and Rhythm: Normal rate and regular rhythm.      Pulses: Intact distal pulses.      Heart sounds: Murmur heard.   No friction rub. No gallop.       Comments: JVP around 9cm H2O, no HJR  Pulmonary:      Effort: Pulmonary effort is normal.      Breath sounds: Normal breath sounds. No wheezing or rales.   Chest:      Chest wall: No tenderness.   Abdominal:      General: Bowel sounds are normal. There is no distension.      Palpations: Abdomen is soft. There is no mass.      Tenderness: There is no abdominal tenderness. There is no guarding or rebound.   Musculoskeletal:         General: No tenderness or edema. Normal range of motion.      Cervical back: Normal range of motion and neck supple.   Skin:     General: Skin is warm and dry.      Findings: No erythema or rash.   Neurological:      Mental Status: She is alert and oriented to person, place, and time.   Psychiatric:         Mood and Affect: Mood and affect normal.         Behavior: Behavior normal.         Thought Content: Thought content normal.         Judgment: Judgment normal.         Labs:      Chemistry        Component Value Date/Time     02/15/2022 0843    K 3.9 02/15/2022 0843     02/15/2022 0843    CO2 28 02/15/2022 0843    BUN 11 02/15/2022 0843    CREATININE 0.9 02/15/2022 0843    GLU 92 02/15/2022 0843        Component Value Date/Time    CALCIUM 9.3 02/15/2022 0843     ALKPHOS 131 02/15/2022 0843    AST 20 02/15/2022 0843    ALT 19 02/15/2022 0843    BILITOT 1.0 02/15/2022 0843    ESTGFRAFRICA >60.0 02/15/2022 0843    EGFRNONAA >60.0 02/15/2022 0843          No results found for: MG  Lab Results   Component Value Date    WBC 2.80 (L) 02/15/2022    HGB 11.4 (L) 02/15/2022    HCT 37.6 02/15/2022    MCV 76 (L) 02/15/2022     02/15/2022     BNP   Date Value Ref Range Status   02/15/2022 538 (H) 0 - 99 pg/mL Final     Comment:     Values of less than 100 pg/ml are consistent with non-CHF populations.     No results found for this or any previous visit.      Labs were reviewed with the patient.    Assessment:      1. Severe mitral regurgitation    2. Chronic combined systolic and diastolic heart failure    3. Diastolic dysfunction    4. Chronic diastolic heart failure    5. Nonrheumatic mitral valve regurgitation    6. Hypertension, unspecified type    7. Paroxysmal atrial fibrillation    8. Morbid obesity    9. Nonrheumatic tricuspid valve regurgitation      Plan:   Reviewed echo, wonder about amyloidosis with grade 3 diastolic dysfunction and biatrial enlargement. Strain not done will see if they can add it on.   Patient is not very symptomatic from her valvular heart disease, not sure how this plays a role.  That being said, blood pressure at home better controlled, for diastolic dysfunction, is on appropriate medication. Would consider addition of aldactone in setting of diastolic dysfunction, will send copy of this note to Dr. Edwards as well.   Has some anemia, may be in setting of gastric sleeve, however not had iron deficiency evaluated.  Recommend weight loss, patient states gastric sleeve being considered again.   afib- HR controlled at 64.   Patient is now NYHA II ACC stage B  No symptoms of sleep apnea but could consider this addition as well.   Will go ahead and obtain some basic amyloid studies, if indication to, will proceed with PYP scan.   Recommend 2 gram sodium  restriction and 1500cc fluid restriction.  Encourage physical activity with graded exercise program.  Requested patient to weigh themselves daily, and to notify us if their weight increases by more than 3 lbs in 1 day or 5 lbs in 1 week.       Winifred Matute MD

## 2022-02-16 ENCOUNTER — CLINICAL SUPPORT (OUTPATIENT)
Dept: CARDIOLOGY | Facility: HOSPITAL | Age: 62
End: 2022-02-16
Attending: INTERNAL MEDICINE
Payer: MEDICAID

## 2022-02-16 DIAGNOSIS — I34.0 NONRHEUMATIC MITRAL VALVE REGURGITATION: Primary | ICD-10-CM

## 2022-02-16 DIAGNOSIS — I34.0 NONRHEUMATIC MITRAL VALVE REGURGITATION: ICD-10-CM

## 2022-02-16 PROCEDURE — 93225 XTRNL ECG REC<48 HRS REC: CPT

## 2022-02-16 PROCEDURE — 93227 HOLTER MONITOR - 48 HOUR (CUPID ONLY): ICD-10-PCS | Mod: ,,, | Performed by: INTERNAL MEDICINE

## 2022-02-16 PROCEDURE — 93227 XTRNL ECG REC<48 HR R&I: CPT | Mod: ,,, | Performed by: INTERNAL MEDICINE

## 2022-02-17 DIAGNOSIS — I50.42 CHRONIC COMBINED SYSTOLIC AND DIASTOLIC HEART FAILURE: Primary | ICD-10-CM

## 2022-02-22 ENCOUNTER — OFFICE VISIT (OUTPATIENT)
Dept: CARDIOTHORACIC SURGERY | Facility: CLINIC | Age: 62
End: 2022-02-22
Payer: MEDICAID

## 2022-02-22 VITALS
HEIGHT: 65 IN | DIASTOLIC BLOOD PRESSURE: 66 MMHG | WEIGHT: 287.94 LBS | SYSTOLIC BLOOD PRESSURE: 142 MMHG | HEART RATE: 70 BPM | OXYGEN SATURATION: 99 % | BODY MASS INDEX: 47.97 KG/M2

## 2022-02-22 DIAGNOSIS — I34.0 NONRHEUMATIC MITRAL VALVE REGURGITATION: ICD-10-CM

## 2022-02-22 DIAGNOSIS — I36.1 NONRHEUMATIC TRICUSPID VALVE REGURGITATION: ICD-10-CM

## 2022-02-22 DIAGNOSIS — I48.0 PAROXYSMAL ATRIAL FIBRILLATION: Primary | ICD-10-CM

## 2022-02-22 LAB
OHS CV EVENT MONITOR DAY: 0
OHS CV HOLTER LENGTH DECIMAL HOURS: 48
OHS CV HOLTER LENGTH HOURS: 48
OHS CV HOLTER LENGTH MINUTES: 0

## 2022-02-22 PROCEDURE — 1159F MED LIST DOCD IN RCRD: CPT | Mod: CPTII,,, | Performed by: THORACIC SURGERY (CARDIOTHORACIC VASCULAR SURGERY)

## 2022-02-22 PROCEDURE — 3077F PR MOST RECENT SYSTOLIC BLOOD PRESSURE >= 140 MM HG: ICD-10-PCS | Mod: CPTII,,, | Performed by: THORACIC SURGERY (CARDIOTHORACIC VASCULAR SURGERY)

## 2022-02-22 PROCEDURE — 3008F PR BODY MASS INDEX (BMI) DOCUMENTED: ICD-10-PCS | Mod: CPTII,,, | Performed by: THORACIC SURGERY (CARDIOTHORACIC VASCULAR SURGERY)

## 2022-02-22 PROCEDURE — 99205 OFFICE O/P NEW HI 60 MIN: CPT | Mod: S$PBB,,, | Performed by: THORACIC SURGERY (CARDIOTHORACIC VASCULAR SURGERY)

## 2022-02-22 PROCEDURE — 3008F BODY MASS INDEX DOCD: CPT | Mod: CPTII,,, | Performed by: THORACIC SURGERY (CARDIOTHORACIC VASCULAR SURGERY)

## 2022-02-22 PROCEDURE — 3078F PR MOST RECENT DIASTOLIC BLOOD PRESSURE < 80 MM HG: ICD-10-PCS | Mod: CPTII,,, | Performed by: THORACIC SURGERY (CARDIOTHORACIC VASCULAR SURGERY)

## 2022-02-22 PROCEDURE — 99999 PR PBB SHADOW E&M-EST. PATIENT-LVL III: ICD-10-PCS | Mod: PBBFAC,,, | Performed by: THORACIC SURGERY (CARDIOTHORACIC VASCULAR SURGERY)

## 2022-02-22 PROCEDURE — 3078F DIAST BP <80 MM HG: CPT | Mod: CPTII,,, | Performed by: THORACIC SURGERY (CARDIOTHORACIC VASCULAR SURGERY)

## 2022-02-22 PROCEDURE — 99205 PR OFFICE/OUTPT VISIT, NEW, LEVL V, 60-74 MIN: ICD-10-PCS | Mod: S$PBB,,, | Performed by: THORACIC SURGERY (CARDIOTHORACIC VASCULAR SURGERY)

## 2022-02-22 PROCEDURE — 3077F SYST BP >= 140 MM HG: CPT | Mod: CPTII,,, | Performed by: THORACIC SURGERY (CARDIOTHORACIC VASCULAR SURGERY)

## 2022-02-22 PROCEDURE — 1159F PR MEDICATION LIST DOCUMENTED IN MEDICAL RECORD: ICD-10-PCS | Mod: CPTII,,, | Performed by: THORACIC SURGERY (CARDIOTHORACIC VASCULAR SURGERY)

## 2022-02-22 PROCEDURE — 99213 OFFICE O/P EST LOW 20 MIN: CPT | Mod: PBBFAC | Performed by: THORACIC SURGERY (CARDIOTHORACIC VASCULAR SURGERY)

## 2022-02-22 PROCEDURE — 99999 PR PBB SHADOW E&M-EST. PATIENT-LVL III: CPT | Mod: PBBFAC,,, | Performed by: THORACIC SURGERY (CARDIOTHORACIC VASCULAR SURGERY)

## 2022-02-22 NOTE — PROGRESS NOTES
Subjective:      Patient ID: Magalys Nguyen is a 61 y.o. female.    Chief Complaint: No chief complaint on file.      HPI:  Magalys Nguyen is a 61 y.o. female who presents for surgical evaluation of MR. Medical conditions include chronic diastolic HF (EF 60%), pAF (on Eliquis), HTN, HLD, obesity s/p gastric sleeve (2017). Saw Dr. Matute who has concerns for restrictive disease / amyloid due to echo findings. Patient states that she was diagnosed with a heart murmur ~ 2 years ago. States that she is not really SOB when walking on a flat surface but more so with stairs. States that she used to walk for exercise but has not been doing that so feels deconditioning also plays a part in her breathing issues. Intermittent swelling in lower extremities. Reports energy level is not what it used to be. Denies any dizziness, orthopnea, chest pain, palpitations. No prior strokes, seizures, blood clots, stents, or sternotomies. Quit smoking 30 years ago. No significant drinking history.     Mitral Valve Disease Etiology: Functional Mitral Regurgitation (Pure annular dilation (w/normal LV systolic function))     The patient has undergone the following MitraClip work-up:   · CASTILLO (Date 1/31/22 ): CASTILLO from [facility] reviewed by Radhames Perdomo and Erick - shows moderate central MR with dilated annulus  · LHC (Date ): Needs   · STS: 1.7%   · Frailty: 1/4   · CT Surgery risk assessment: Needs  · Comorbidities: HFpEF, pAF, HTN, HLD, obesity   · Labs: Hgb 12.1, Cr 1.1,     Family and social history reviewed    Review of patient's allergies indicates:   Allergen Reactions    Morphine Itching     Past Medical History:   Diagnosis Date    Anticoagulant long-term use     Depression     GERD (gastroesophageal reflux disease)     Hypertension     Medical history non-contributory     Paroxysmal A-fib      Past Surgical History:   Procedure Laterality Date    FINGER SURGERY Left     index fracture repair    GALLBLADDER SURGERY  08/2021     KNEE ARTHROSCOPY      SHOULDER ARTHROSCOPY      SHOULDER SURGERY       Family History     Problem Relation (Age of Onset)    Stroke Mother        Social History     Socioeconomic History    Marital status:    Tobacco Use    Smoking status: Never Smoker    Smokeless tobacco: Never Used   Substance and Sexual Activity    Alcohol use: No    Drug use: No   Social History Narrative    ** Merged History Encounter **            Current medications Reviewed    Review of Systems   Constitutional: Positive for activity change and fatigue.   HENT: Negative for nosebleeds.    Eyes: Negative for visual disturbance.   Respiratory: Positive for shortness of breath.    Cardiovascular: Positive for leg swelling. Negative for chest pain and palpitations.   Gastrointestinal: Negative for nausea.   Musculoskeletal: Negative for gait problem.   Skin: Negative for color change.   Neurological: Negative for dizziness and seizures.   Hematological: Does not bruise/bleed easily.   Psychiatric/Behavioral: Negative for sleep disturbance.     Objective:   Physical Exam  Constitutional:       General: She is not in acute distress.     Appearance: She is obese.   HENT:      Head: Normocephalic and atraumatic.   Eyes:      Pupils: Pupils are equal, round, and reactive to light.   Cardiovascular:      Rate and Rhythm: Normal rate.      Heart sounds: Murmur heard.   Pulmonary:      Effort: Pulmonary effort is normal. No respiratory distress.   Abdominal:      General: There is no distension.   Musculoskeletal:         General: Swelling present. Normal range of motion.      Cervical back: Normal range of motion.   Skin:     General: Skin is warm and dry.      Coloration: Skin is not pale.   Neurological:      General: No focal deficit present.      Mental Status: She is alert.   Psychiatric:         Mood and Affect: Mood normal.         Behavior: Behavior normal.         Thought Content: Thought content normal.         Judgment:  Judgment normal.         Diagnostic Results: reviewed   TTE 2/11/22  · The estimated ejection fraction is 60%.  · The left ventricle is normal in size with normal systolic function.  · Grade III left ventricular diastolic dysfunction.  · Normal right ventricular size with normal right ventricular systolic function.  · Severe left atrial enlargement.  · Moderate right atrial enlargement.  · Severe mitral regurgitation.  · Moderate to severe tricuspid regurgitation.  · There is pulmonary hypertension.  · The estimated PA systolic pressure is 50 mmHg.  · Normal central venous pressure (3 mmHg).  · LV 5.5  · TAPSE 1.8    Assessment:   MR   Afib   Plan:     CTS Attending Note:    I have personally taken the history and examined this patient and agree with the VIDA's note as stated above.  Very pleasant 61-year-old woman with severe mitral regurgitation.  She also has atrial fibrillation as well as moderate to severe tricuspid regurgitation.  She has pulmonary hypertension.  Surprisingly, she is minimally symptomatic.  With the presence of pulmonary hypertension and atrial fibrillation, she meets criteria to have her mitral valve addressed.  She reports that she remains fairly active, and so I believe from a functional status standpoint she would be a reasonable surgical candidate.  Surgery may be the best option for her, as we would be able to address both her tricuspid regurgitation as well as her atrial fibrillation at the time of mitral valve surgery.  However, I am concerned about the question of amyloidosis.  If she does have cardiac amyloid, then surgical correction of her valvular heart disease would be ill-advised.  We will await her amyloid evaluation, and then doctors Conor and Juju and I will formulate a plan.

## 2022-03-12 NOTE — H&P (VIEW-ONLY)
Interventional Cardiology Clinic Note  Reason for Visit: severe MR    HPI:   61 y.o. F with chronic diastolic HF (EF 60%), persistent AF (on Eliquis), HTN, HLD, obesity s/p gastric sleeve (2017) who is referred for evaluation of severe MR    Last saw Dr. Almanza's on 2/15/22.  The workup is below.  Saw CTS on 2/22/22 and Dr. Morris recommended surgery to be able to correct both the the TR and MR however is concerned about the possibility of amyloidosis in which catheter based approach would be better.  Saw Dr. Matute and PYP currently pending.      Magalys Nguyen is a 61 y.o. female referred by Dr Edwards for evaluation of severe MR (NYHA Class II sx).     Mitral Valve Disease Etiology: Functional Mitral Regurgitation (Pure annular dilation (w/normal LV systolic function))     The patient has undergone the following MitraClip work-up:   · CASTILLO (Date 1/31/22 ): CASTILLO from [facility] reviewed by Radhames Perdomo and Erick - shows moderate central MR with dilated annulus  · LHC (Date ): Needs   · STS: 1.7%   · Frailty: 1/4   · CT Surgery risk assessment: Needs  · Comorbidities: HFpEF, pAF, HTN, HLD, obesity   · Labs: Hgb 12.1, Cr 1.1,       ROS:    Constitution: Negative for fever, chills, weight loss or gain.   HENT: Negative for sore throat, rhinorrhea, or headache.  Eyes: Negative for blurred or double vision.   Cardiovascular: See above  Pulmonary: Negative for SOB   Gastrointestinal: Negative for abdominal pain, nausea, vomiting, or diarrhea.   : Negative for dysuria.   Neurological: Negative for focal weakness or sensory changes.  PMH:     Past Medical History:   Diagnosis Date    Anticoagulant long-term use     Depression     GERD (gastroesophageal reflux disease)     Hypertension     Medical history non-contributory     Paroxysmal A-fib      Past Surgical History:   Procedure Laterality Date    FINGER SURGERY Left     index fracture repair    GALLBLADDER SURGERY  08/2021    KNEE ARTHROSCOPY       SHOULDER ARTHROSCOPY      SHOULDER SURGERY       Allergies:     Review of patient's allergies indicates:   Allergen Reactions    Morphine Itching     Medications:     Current Outpatient Medications on File Prior to Visit   Medication Sig Dispense Refill    amlodipine (NORVASC) 5 MG tablet Take 5 mg by mouth every evening.   2    apixaban (ELIQUIS) 5 mg Tab Take 5 mg by mouth 2 (two) times daily.      aspirin (ECOTRIN) 81 MG EC tablet Take 81 mg by mouth every evening.      benazepril (LOTENSIN) 40 MG tablet Take 40 mg by mouth every evening.       ergocalciferol (ERGOCALCIFEROL) 50,000 unit Cap Take 50,000 Units by mouth every 7 days.      famotidine (PEPCID) 20 MG tablet Take 1 tablet (20 mg total) by mouth 2 (two) times daily. (Patient not taking: Reported on 2/15/2022) 28 tablet 0    fluticasone propionate (FLONASE) 50 mcg/actuation nasal spray 1 spray by Each Nostril route once daily.      furosemide (LASIX) 20 MG tablet Take 20 mg by mouth daily as needed.      hydrochlorothiazide (HYDRODIURIL) 25 MG tablet Take 25 mg by mouth every evening.       meloxicam (MOBIC) 15 MG tablet Take 15 mg by mouth once daily.      metoprolol succinate (TOPROL-XL) 25 MG 24 hr tablet Take 25 mg by mouth once daily.      omega-3 acid ethyl esters (LOVAZA) 1 gram capsule Take 2 g by mouth 2 (two) times daily.      omeprazole (PRILOSEC) 40 MG capsule Take 1 capsule (40 mg total) by mouth every morning. 14 capsule 0    potassium chloride SA (K-DUR,KLOR-CON) 20 MEQ tablet Take 20 mEq by mouth once daily.       No current facility-administered medications on file prior to visit.     Social History:     Social History     Tobacco Use    Smoking status: Never Smoker    Smokeless tobacco: Never Used   Substance Use Topics    Alcohol use: No     Family History:     Family History   Problem Relation Age of Onset    Stroke Mother      Physical Exam:   LMP 08/01/2014    Wt Readings from Last 4 Encounters:   02/22/22 130.6  kg (287 lb 14.7 oz)   02/15/22 132.2 kg (291 lb 7.2 oz)   02/15/22 132.4 kg (291 lb 14.2 oz)   02/11/22 122.5 kg (270 lb)         Constitutional: No distress, obese, conversant  HEENT: Sclera anicteric, PERRLA, EOMI  Neck: No JVD, no masses, good movement  CV: RRR, S1 and S2 normal, 2/6 sys murmur. Pulses 2+ and equal bilaterally in radial arteries, Justin's normal on right. Distal pulses are 2+ and equal in the femoral, DP and PT areas bilaterally  Pulm: Clear to auscultation bilaterally with symmetrical expansion. Chest wall palpated for reproduction of pain symptoms, and no pain was able to be produced on palpation or resistance exercises  GI: Abdomen soft, non-tender, good bowel sounds  Extremities: Both extremities intact and grossly normal, skin is warm, no edema noted  Skin: No ecchymosis, erythema, or ulcers  Psych: AOx3, appropriate affect  Neuro: CNII-XII intact, no focal deficits      Labs:     Lab Results   Component Value Date     02/15/2022    K 3.9 02/15/2022     02/15/2022    CO2 28 02/15/2022    BUN 11 02/15/2022    CREATININE 0.9 02/15/2022    ANIONGAP 7 (L) 02/15/2022     Lab Results   Component Value Date    HGBA1C 5.3 03/04/2020     Lab Results   Component Value Date     (H) 02/15/2022    Lab Results   Component Value Date    WBC 2.80 (L) 02/15/2022    HGB 11.4 (L) 02/15/2022    HCT 37.6 02/15/2022     02/15/2022    GRAN 1.3 (L) 02/15/2022    GRAN 45.3 02/15/2022     Lab Results   Component Value Date    CHOL 181 02/15/2022    HDL 60 02/15/2022    LDLCALC 98.8 02/15/2022    TRIG 111 02/15/2022          Imaging:         EF   Date Value Ref Range Status   02/11/2022 60 % Final     TTE:   · The estimated ejection fraction is 60%.  · The left ventricle is normal in size with normal systolic function.  · Grade III left ventricular diastolic dysfunction.  · Normal right ventricular size with normal right ventricular systolic function.  · Severe left atrial  enlargement.  · Moderate right atrial enlargement.  · Severe mitral regurgitation.  · Moderate to severe tricuspid regurgitation.  · There is pulmonary hypertension.  · The estimated PA systolic pressure is 50 mmHg.  · Normal central venous pressure (3 mmHg).      Assessment:   61 y.o. F with chronic diastolic HF (EF 60%), persistent AF (on Eliquis), HTN, HLD, obesity s/p gastric sleeve (2017) who is referred for evaluation of severe MR    Plan:     1- Nonrheumatic mitral valve regurgitation      Magalys Nguyen is a 61 y.o. female referred by Dr Edwards for evaluation of severe MR (NYHA Class II sx).     Mitral Valve Disease Etiology: Functional Mitral Regurgitation (Pure annular dilation (w/normal LV systolic function))     The patient has undergone the following MitraClip work-up:   · CASTILLO (Date 1/31/22 ): CASTILLO from [facility] reviewed by Radhames Perdomo and Erick - shows moderate central MR with dilated annulus  · LHC (Date ): Needs   · STS: 1.7%   · Frailty: 1/4   · CT Surgery risk assessment: Needs  · Comorbidities: HFpEF, pAF, HTN, HLD, obesity   · Labs: Hgb 12.1, Cr 1.1,      Plan:  1. CTS is recommending surgical procedure if PYP is negative.  PYP is currently pending  2. Holter monitor shows peristent Afib  3. Will plan for LHC    1. Cardiac catheterization with probable PCI.   2. Antiplatelets: ASA/Plavix  3. Access: R Radial  4. Catheters: Davon  5. Pt is a PRIMO candidate and understands the importance of taking plavix for at least one year, understands that in case of receiving a drug coated stent the failure to comply with dual anti-platelet therapy is likely to result in stent clothing, heart attack and death.   6. The risks, benefits, and alternatives of coronary vascular angiography and possible intervention were discussed with the patient. All questions were answered and informed consent was obtained. I had a detailed discussion with the patient regarding risk of stroke, MI, bleeding access site  complications including limb loss, allergy, kidney failure including dialysis and death.  7. The patient understands the risks and benefits and wishes to go ahead with the procedure.  8. All patient's questions were answered       2- Chronic diastolic heart failure  - Patient reports NYHA class II symptoms.   - Continue medical therapy aimed at blood pressure control. Low sodium diet.      3- Persistent Atrial fibrillation  - Patient in atrial fibrillation today. [id number] 3.   - Continue Eliquis for CVA ppx     4- Hypertension  - BP controlled. Continue current antihypertensive regimen.      5- Hyperlipidemia  - Continue lipid lowering therapy.      6- Morbid obesity  - Body mass index is 48.5 kg/m². Weight loss through diet and exercise.     Signed:  Erica Wayne MD  Interventional Cardiology Fellow  Pager [phone]        Interventional Cardiology Staff  I have personally taken the history and examined this patient. I have discussed and agree with the resident's findings and plan as documented in the resident's note.  MitraClip workup scheduled for left heart angiography and amyloid testing by HTS next week.    Isauro Perdomo

## 2022-03-12 NOTE — PROGRESS NOTES
Interventional Cardiology Clinic Note  Reason for Visit: severe MR    HPI:   61 y.o. F with chronic diastolic HF (EF 60%), persistent AF (on Eliquis), HTN, HLD, obesity s/p gastric sleeve (2017) who is referred for evaluation of severe MR    Last saw Dr. Almanza's on 2/15/22.  The workup is below.  Saw CTS on 2/22/22 and Dr. Morris recommended surgery to be able to correct both the the TR and MR however is concerned about the possibility of amyloidosis in which catheter based approach would be better.  Saw Dr. Matute and PYP currently pending.      Magalys Nguyen is a 61 y.o. female referred by Dr Edwards for evaluation of severe MR (NYHA Class II sx).     Mitral Valve Disease Etiology: Functional Mitral Regurgitation (Pure annular dilation (w/normal LV systolic function))     The patient has undergone the following MitraClip work-up:   · CASTILLO (Date 1/31/22 ): CASTILLO from [facility] reviewed by Radhames Perdomo and Erick - shows moderate central MR with dilated annulus  · LHC (Date ): Needs   · STS: 1.7%   · Frailty: 1/4   · CT Surgery risk assessment: Needs  · Comorbidities: HFpEF, pAF, HTN, HLD, obesity   · Labs: Hgb 12.1, Cr 1.1,       ROS:    Constitution: Negative for fever, chills, weight loss or gain.   HENT: Negative for sore throat, rhinorrhea, or headache.  Eyes: Negative for blurred or double vision.   Cardiovascular: See above  Pulmonary: Negative for SOB   Gastrointestinal: Negative for abdominal pain, nausea, vomiting, or diarrhea.   : Negative for dysuria.   Neurological: Negative for focal weakness or sensory changes.  PMH:     Past Medical History:   Diagnosis Date    Anticoagulant long-term use     Depression     GERD (gastroesophageal reflux disease)     Hypertension     Medical history non-contributory     Paroxysmal A-fib      Past Surgical History:   Procedure Laterality Date    FINGER SURGERY Left     index fracture repair    GALLBLADDER SURGERY  08/2021    KNEE ARTHROSCOPY       SHOULDER ARTHROSCOPY      SHOULDER SURGERY       Allergies:     Review of patient's allergies indicates:   Allergen Reactions    Morphine Itching     Medications:     Current Outpatient Medications on File Prior to Visit   Medication Sig Dispense Refill    amlodipine (NORVASC) 5 MG tablet Take 5 mg by mouth every evening.   2    apixaban (ELIQUIS) 5 mg Tab Take 5 mg by mouth 2 (two) times daily.      aspirin (ECOTRIN) 81 MG EC tablet Take 81 mg by mouth every evening.      benazepril (LOTENSIN) 40 MG tablet Take 40 mg by mouth every evening.       ergocalciferol (ERGOCALCIFEROL) 50,000 unit Cap Take 50,000 Units by mouth every 7 days.      famotidine (PEPCID) 20 MG tablet Take 1 tablet (20 mg total) by mouth 2 (two) times daily. (Patient not taking: Reported on 2/15/2022) 28 tablet 0    fluticasone propionate (FLONASE) 50 mcg/actuation nasal spray 1 spray by Each Nostril route once daily.      furosemide (LASIX) 20 MG tablet Take 20 mg by mouth daily as needed.      hydrochlorothiazide (HYDRODIURIL) 25 MG tablet Take 25 mg by mouth every evening.       meloxicam (MOBIC) 15 MG tablet Take 15 mg by mouth once daily.      metoprolol succinate (TOPROL-XL) 25 MG 24 hr tablet Take 25 mg by mouth once daily.      omega-3 acid ethyl esters (LOVAZA) 1 gram capsule Take 2 g by mouth 2 (two) times daily.      omeprazole (PRILOSEC) 40 MG capsule Take 1 capsule (40 mg total) by mouth every morning. 14 capsule 0    potassium chloride SA (K-DUR,KLOR-CON) 20 MEQ tablet Take 20 mEq by mouth once daily.       No current facility-administered medications on file prior to visit.     Social History:     Social History     Tobacco Use    Smoking status: Never Smoker    Smokeless tobacco: Never Used   Substance Use Topics    Alcohol use: No     Family History:     Family History   Problem Relation Age of Onset    Stroke Mother      Physical Exam:   LMP 08/01/2014    Wt Readings from Last 4 Encounters:   02/22/22 130.6  kg (287 lb 14.7 oz)   02/15/22 132.2 kg (291 lb 7.2 oz)   02/15/22 132.4 kg (291 lb 14.2 oz)   02/11/22 122.5 kg (270 lb)         Constitutional: No distress, obese, conversant  HEENT: Sclera anicteric, PERRLA, EOMI  Neck: No JVD, no masses, good movement  CV: RRR, S1 and S2 normal, 2/6 sys murmur. Pulses 2+ and equal bilaterally in radial arteries, Justin's normal on right. Distal pulses are 2+ and equal in the femoral, DP and PT areas bilaterally  Pulm: Clear to auscultation bilaterally with symmetrical expansion. Chest wall palpated for reproduction of pain symptoms, and no pain was able to be produced on palpation or resistance exercises  GI: Abdomen soft, non-tender, good bowel sounds  Extremities: Both extremities intact and grossly normal, skin is warm, no edema noted  Skin: No ecchymosis, erythema, or ulcers  Psych: AOx3, appropriate affect  Neuro: CNII-XII intact, no focal deficits      Labs:     Lab Results   Component Value Date     02/15/2022    K 3.9 02/15/2022     02/15/2022    CO2 28 02/15/2022    BUN 11 02/15/2022    CREATININE 0.9 02/15/2022    ANIONGAP 7 (L) 02/15/2022     Lab Results   Component Value Date    HGBA1C 5.3 03/04/2020     Lab Results   Component Value Date     (H) 02/15/2022    Lab Results   Component Value Date    WBC 2.80 (L) 02/15/2022    HGB 11.4 (L) 02/15/2022    HCT 37.6 02/15/2022     02/15/2022    GRAN 1.3 (L) 02/15/2022    GRAN 45.3 02/15/2022     Lab Results   Component Value Date    CHOL 181 02/15/2022    HDL 60 02/15/2022    LDLCALC 98.8 02/15/2022    TRIG 111 02/15/2022          Imaging:         EF   Date Value Ref Range Status   02/11/2022 60 % Final     TTE:   · The estimated ejection fraction is 60%.  · The left ventricle is normal in size with normal systolic function.  · Grade III left ventricular diastolic dysfunction.  · Normal right ventricular size with normal right ventricular systolic function.  · Severe left atrial  enlargement.  · Moderate right atrial enlargement.  · Severe mitral regurgitation.  · Moderate to severe tricuspid regurgitation.  · There is pulmonary hypertension.  · The estimated PA systolic pressure is 50 mmHg.  · Normal central venous pressure (3 mmHg).      Assessment:   61 y.o. F with chronic diastolic HF (EF 60%), persistent AF (on Eliquis), HTN, HLD, obesity s/p gastric sleeve (2017) who is referred for evaluation of severe MR    Plan:     1- Nonrheumatic mitral valve regurgitation      Magalys Ngyuen is a 61 y.o. female referred by Dr Edwards for evaluation of severe MR (NYHA Class II sx).     Mitral Valve Disease Etiology: Functional Mitral Regurgitation (Pure annular dilation (w/normal LV systolic function))     The patient has undergone the following MitraClip work-up:   · CASTILLO (Date 1/31/22 ): CA[facility]ILLO from [facility] reviewed by Radhames Perdomo and Erick - shows moderate central MR with dilated annulus  · LHC (Date ): Needs   · STS: 1.7%   · Frailty: 1/4   · CT Surgery risk assessment: Needs  · Comorbidities: HFpEF, pAF, HTN, HLD, obesity   · Labs: Hgb 12.1, Cr 1.1,      Plan:  1. CTS is recommending surgical procedure if PYP is negative.  PYP is currently pending  2. Holter monitor shows peristent Afib  3. Will plan for LHC    1. Cardiac catheterization with probable PCI.   2. Antiplatelets: ASA/Plavix  3. Access: R Radial  4. Catheters: Davon  5. Pt is a PRIMO candidate and understands the importance of taking plavix for at least one year, understands that in case of receiving a drug coated stent the failure to comply with dual anti-platelet therapy is likely to result in stent clothing, heart attack and death.   6. The risks, benefits, and alternatives of coronary vascular angiography and possible intervention were discussed with the patient. All questions were answered and informed consent was obtained. I had a detailed discussion with the patient regarding risk of stroke, MI, bleeding access site  complications including limb loss, allergy, kidney failure including dialysis and death.  7. The patient understands the risks and benefits and wishes to go ahead with the procedure.  8. All patient's questions were answered       2- Chronic diastolic heart failure  - Patient reports NYHA class II symptoms.   - Continue medical therapy aimed at blood pressure control. Low sodium diet.      3- Persistent Atrial fibrillation  - Patient in atrial fibrillation today. [id number] 3.   - Continue Eliquis for CVA ppx     4- Hypertension  - BP controlled. Continue current antihypertensive regimen.      5- Hyperlipidemia  - Continue lipid lowering therapy.      6- Morbid obesity  - Body mass index is 48.5 kg/m². Weight loss through diet and exercise.     Signed:  Erica Wayne MD  Interventional Cardiology Fellow  Pager [phone]        Interventional Cardiology Staff  I have personally taken the history and examined this patient. I have discussed and agree with the resident's findings and plan as documented in the resident's note.  MitraClip workup scheduled for left heart angiography and amyloid testing by HTS next week.    Isauro Perdomo

## 2022-03-15 ENCOUNTER — HOSPITAL ENCOUNTER (OUTPATIENT)
Dept: CARDIOLOGY | Facility: HOSPITAL | Age: 62
Discharge: HOME | End: 2022-03-15
Attending: INTERNAL MEDICINE
Payer: MEDICAID

## 2022-03-15 ENCOUNTER — DOCUMENTATION ONLY (OUTPATIENT)
Dept: CARDIOLOGY | Facility: CLINIC | Age: 62
End: 2022-03-15
Payer: MEDICAID

## 2022-03-15 ENCOUNTER — OFFICE VISIT (OUTPATIENT)
Dept: CARDIOLOGY | Facility: CLINIC | Age: 62
End: 2022-03-15
Payer: MEDICAID

## 2022-03-15 VITALS
BODY MASS INDEX: 47.86 KG/M2 | DIASTOLIC BLOOD PRESSURE: 82 MMHG | BODY MASS INDEX: 47.82 KG/M2 | SYSTOLIC BLOOD PRESSURE: 128 MMHG | HEIGHT: 65 IN | OXYGEN SATURATION: 100 % | DIASTOLIC BLOOD PRESSURE: 60 MMHG | SYSTOLIC BLOOD PRESSURE: 132 MMHG | WEIGHT: 287.25 LBS | WEIGHT: 287 LBS | HEART RATE: 61 BPM | HEIGHT: 65 IN | HEART RATE: 68 BPM

## 2022-03-15 DIAGNOSIS — I34.0 NONRHEUMATIC MITRAL VALVE REGURGITATION: ICD-10-CM

## 2022-03-15 DIAGNOSIS — I10 HYPERTENSION, UNSPECIFIED TYPE: ICD-10-CM

## 2022-03-15 DIAGNOSIS — E78.5 HYPERLIPIDEMIA, UNSPECIFIED HYPERLIPIDEMIA TYPE: ICD-10-CM

## 2022-03-15 DIAGNOSIS — I34.0 NONRHEUMATIC MITRAL VALVE REGURGITATION: Primary | ICD-10-CM

## 2022-03-15 DIAGNOSIS — I48.0 PAROXYSMAL ATRIAL FIBRILLATION: ICD-10-CM

## 2022-03-15 DIAGNOSIS — I36.1 NONRHEUMATIC TRICUSPID VALVE REGURGITATION: ICD-10-CM

## 2022-03-15 LAB
ASCENDING AORTA: 3.26 CM
AV INDEX (PROSTH): 0.88
AV MEAN GRADIENT: 3 MMHG
AV PEAK GRADIENT: 7 MMHG
AV VALVE AREA: 3.01 CM2
AV VELOCITY RATIO: 0.73
BSA FOR ECHO PROCEDURE: 2.44 M2
CV ECHO LV RWT: 0.26 CM
DOP CALC AO PEAK VEL: 1.29 M/S
DOP CALC AO VTI: 25.08 CM
DOP CALC LVOT AREA: 3.4 CM2
DOP CALC LVOT DIAMETER: 2.09 CM
DOP CALC LVOT PEAK VEL: 0.94 M/S
DOP CALC LVOT STROKE VOLUME: 75.54 CM3
DOP CALC MV VTI: 17.88 CM
DOP CALCLVOT PEAK VEL VTI: 22.03 CM
E WAVE DECELERATION TIME: 244.41 MSEC
E/A RATIO: 3.13
E/E' RATIO: 14.29 M/S
ECHO LV POSTERIOR WALL: 0.66 CM (ref 0.6–1.1)
EJECTION FRACTION: 60 %
FRACTIONAL SHORTENING: 35 % (ref 28–44)
INTERVENTRICULAR SEPTUM: 0.69 CM (ref 0.6–1.1)
IVRT: 54.23 MSEC
LA MAJOR: 8.25 CM
LA MINOR: 8.23 CM
LA WIDTH: 6 CM
LEFT ATRIUM SIZE: 5.96 CM
LEFT ATRIUM VOLUME INDEX MOD: 84 ML/M2
LEFT ATRIUM VOLUME INDEX: 108.4 ML/M2
LEFT ATRIUM VOLUME MOD: 194.12 CM3
LEFT ATRIUM VOLUME: 250.46 CM3
LEFT INTERNAL DIMENSION IN SYSTOLE: 3.3 CM (ref 2.1–4)
LEFT VENTRICLE DIASTOLIC VOLUME INDEX: 52.38 ML/M2
LEFT VENTRICLE DIASTOLIC VOLUME: 120.99 ML
LEFT VENTRICLE MASS INDEX: 48 G/M2
LEFT VENTRICLE SYSTOLIC VOLUME INDEX: 19.1 ML/M2
LEFT VENTRICLE SYSTOLIC VOLUME: 44.12 ML
LEFT VENTRICULAR INTERNAL DIMENSION IN DIASTOLE: 5.05 CM (ref 3.5–6)
LEFT VENTRICULAR MASS: 111.55 G
LV LATERAL E/E' RATIO: 12.5 M/S
LV SEPTAL E/E' RATIO: 16.67 M/S
MV A" WAVE DURATION": 10.28 MSEC
MV MEAN GRADIENT: 0 MMHG
MV PEAK A VEL: 0.48 M/S
MV PEAK E VEL: 1.5 M/S
MV PEAK GRADIENT: 3 MMHG
MV STENOSIS PRESSURE HALF TIME: 77.65 MS
MV VALVE AREA BY CONTINUITY EQUATION: 4.22 CM2
MV VALVE AREA P 1/2 METHOD: 2.83 CM2
PISA MRMAX VEL: 0.05 M/S
PISA TR MAX VEL: 3.28 M/S
PULM VEIN S/D RATIO: 0.31
PV PEAK D VEL: 0.8 M/S
PV PEAK S VEL: 0.25 M/S
RA MAJOR: 7.09 CM
RA PRESSURE: 15 MMHG
RA WIDTH: 5.2 CM
RIGHT VENTRICULAR END-DIASTOLIC DIMENSION: 4.36 CM
RV TISSUE DOPPLER FREE WALL SYSTOLIC VELOCITY 1 (APICAL 4 CHAMBER VIEW): 10.24 CM/S
SINUS: 2.79 CM
STJ: 2.51 CM
TDI LATERAL: 0.12 M/S
TDI SEPTAL: 0.09 M/S
TDI: 0.11 M/S
TR MAX PG: 43 MMHG
TRICUSPID ANNULAR PLANE SYSTOLIC EXCURSION: 1.61 CM
TV REST PULMONARY ARTERY PRESSURE: 58 MMHG

## 2022-03-15 PROCEDURE — 99215 OFFICE O/P EST HI 40 MIN: CPT | Mod: S$PBB,,, | Performed by: INTERNAL MEDICINE

## 2022-03-15 PROCEDURE — 99214 OFFICE O/P EST MOD 30 MIN: CPT | Mod: PBBFAC,25 | Performed by: INTERNAL MEDICINE

## 2022-03-15 PROCEDURE — 3075F SYST BP GE 130 - 139MM HG: CPT | Mod: CPTII,,, | Performed by: INTERNAL MEDICINE

## 2022-03-15 PROCEDURE — 3078F DIAST BP <80 MM HG: CPT | Mod: CPTII,,, | Performed by: INTERNAL MEDICINE

## 2022-03-15 PROCEDURE — 1159F PR MEDICATION LIST DOCUMENTED IN MEDICAL RECORD: ICD-10-PCS | Mod: CPTII,,, | Performed by: INTERNAL MEDICINE

## 2022-03-15 PROCEDURE — 99999 PR PBB SHADOW E&M-EST. PATIENT-LVL IV: ICD-10-PCS | Mod: PBBFAC,,, | Performed by: INTERNAL MEDICINE

## 2022-03-15 PROCEDURE — 3008F BODY MASS INDEX DOCD: CPT | Mod: CPTII,,, | Performed by: INTERNAL MEDICINE

## 2022-03-15 PROCEDURE — 3078F PR MOST RECENT DIASTOLIC BLOOD PRESSURE < 80 MM HG: ICD-10-PCS | Mod: CPTII,,, | Performed by: INTERNAL MEDICINE

## 2022-03-15 PROCEDURE — 3075F PR MOST RECENT SYSTOLIC BLOOD PRESS GE 130-139MM HG: ICD-10-PCS | Mod: CPTII,,, | Performed by: INTERNAL MEDICINE

## 2022-03-15 PROCEDURE — 93306 TTE W/DOPPLER COMPLETE: CPT

## 2022-03-15 PROCEDURE — 1160F RVW MEDS BY RX/DR IN RCRD: CPT | Mod: CPTII,,, | Performed by: INTERNAL MEDICINE

## 2022-03-15 PROCEDURE — 93306 ECHO (CUPID ONLY): ICD-10-PCS | Mod: 26,,, | Performed by: INTERNAL MEDICINE

## 2022-03-15 PROCEDURE — 93306 TTE W/DOPPLER COMPLETE: CPT | Mod: 26,,, | Performed by: INTERNAL MEDICINE

## 2022-03-15 PROCEDURE — 3008F PR BODY MASS INDEX (BMI) DOCUMENTED: ICD-10-PCS | Mod: CPTII,,, | Performed by: INTERNAL MEDICINE

## 2022-03-15 PROCEDURE — 1159F MED LIST DOCD IN RCRD: CPT | Mod: CPTII,,, | Performed by: INTERNAL MEDICINE

## 2022-03-15 PROCEDURE — 99215 PR OFFICE/OUTPT VISIT, EST, LEVL V, 40-54 MIN: ICD-10-PCS | Mod: S$PBB,,, | Performed by: INTERNAL MEDICINE

## 2022-03-15 PROCEDURE — 1160F PR REVIEW ALL MEDS BY PRESCRIBER/CLIN PHARMACIST DOCUMENTED: ICD-10-PCS | Mod: CPTII,,, | Performed by: INTERNAL MEDICINE

## 2022-03-15 PROCEDURE — 99999 PR PBB SHADOW E&M-EST. PATIENT-LVL IV: CPT | Mod: PBBFAC,,, | Performed by: INTERNAL MEDICINE

## 2022-03-15 NOTE — PROGRESS NOTES
"OUTPATIENT CATHETERIZATION INSTRUCTIONS    You have been scheduled for a procedure in the catheterization lab on Wednesday, April 13, 2022.     Please report to the Cardiology Waiting Area on the Third floor of the hospital and check in at 6 AM.   You will then be taken to the SSCU (Short Stay Cardiac Unit) and prepared for your procedure. Please be aware that this is not the time of your procedure but the time you are to arrive. The procedures are scheduled on an hourly basis; however, emergency cases take precedence over all other cases.       You may not have anything to eat or drink after midnight the night before your test. You may take your regular morning medications with water. If there are any medications that you should not take you will be instructed to hold them that morning. If you are diabetic and on Metformin (Glucophage) do not take it the day before, the day of, and for 2 days after your procedure.      The procedure will take 1-2 hours to perform. After the procedure, you will return to SSCU on the third floor of the hospital. You will need to lie still (or keep your arm still) for the next 4 to 6 hours to minimize bleeding from the puncture site. Your family may remain in the room with you during this time.       You may be able to be discharged home that same afternoon if there is someone to drive you home and there were no complications. If you have one of the balloon, stent, or device procedures you may spend the night in the hospital. Your doctor will determine, based on your progress, the date and time of your discharge. The results of your procedure will be discussed with you before you are discharged. Any further testing or procedures will be scheduled for you either before you leave or you will be called with these appointments.       If you should have any questions, concerns, or need to change the date of your procedure, please call "RAYMOND Humphrey @ [phone]    Special " Instructions:    Your last dose of Eliquis medication will be taken on Yazan March 10, 2022. It has to be stopped three days before your procedure.    Continue taking your other prescribed medications.    Drink plenty of water the day before and after your procedure.           THE ABOVE INSTRUCTIONS WERE GIVEN TO THE PATIENT VERBALLY AND THEY VERBALIZED UNDERSTANDING.  THEY DO NOT REQUIRE ANY SPECIAL NEEDS AND DO NOT HAVE ANY LEARNING BARRIERS.          Directions for Reporting to Cardiology Waiting Area in the Hospital  If you park in the Parking Garage:  Take elevators to the1st floor of the parking garage.  Continue past the gift shop, coffee shop, and piano.  Take a right and go to the gold elevators. (Elevator B)  Take the elevator to the 3rd floor.  Follow the arrow on the sign on the wall that says Cath Lab Registration/EP Lab Registration.  Follow the long hallway all the way around until you come to a big open area.  This is the registration area.  Check in at Reception Desk.    OR    If family is dropping you off:  Have them drop you off at the front of the Hospital under the green overhang.  Enter through the doors and take a right.  Take the E elevators to the 3rd floor Cardiology Waiting Area.  Check in at the Reception Desk in the waiting room.

## 2022-03-21 ENCOUNTER — TELEPHONE (OUTPATIENT)
Dept: CARDIOLOGY | Facility: HOSPITAL | Age: 62
End: 2022-03-21
Payer: MEDICAID

## 2022-03-23 ENCOUNTER — HOSPITAL ENCOUNTER (OUTPATIENT)
Dept: CARDIOLOGY | Facility: HOSPITAL | Age: 62
Discharge: HOME | End: 2022-03-23
Attending: INTERNAL MEDICINE
Payer: MEDICAID

## 2022-03-23 DIAGNOSIS — I50.42 CHRONIC COMBINED SYSTOLIC AND DIASTOLIC HEART FAILURE: ICD-10-CM

## 2022-03-23 LAB — OHS CV PLANAR SCORE: 0

## 2022-03-23 PROCEDURE — 78803 CV PYP ATTR W SPECT (CUPID ONLY): ICD-10-PCS | Mod: 26,,, | Performed by: INTERNAL MEDICINE

## 2022-03-23 PROCEDURE — 78803 RP LOCLZJ TUM SPECT 1 AREA: CPT

## 2022-03-23 PROCEDURE — 78803 RP LOCLZJ TUM SPECT 1 AREA: CPT | Mod: 26,,, | Performed by: INTERNAL MEDICINE

## 2022-03-29 ENCOUNTER — TELEPHONE (OUTPATIENT)
Dept: TRANSPLANT | Facility: CLINIC | Age: 62
End: 2022-03-29
Payer: MEDICAID

## 2022-04-11 ENCOUNTER — HOSPITAL ENCOUNTER (OUTPATIENT)
Dept: PREADMISSION TESTING | Facility: OTHER | Age: 62
Discharge: HOME | End: 2022-04-11
Attending: INTERNAL MEDICINE
Payer: MEDICAID

## 2022-04-11 DIAGNOSIS — Z01.818 PRE-OP TESTING: ICD-10-CM

## 2022-04-11 LAB
SARS-COV-2 RNA RESP QL NAA+PROBE: NOT DETECTED
SARS-COV-2- CYCLE NUMBER: NORMAL

## 2022-04-11 PROCEDURE — U0003 INFECTIOUS AGENT DETECTION BY NUCLEIC ACID (DNA OR RNA); SEVERE ACUTE RESPIRATORY SYNDROME CORONAVIRUS 2 (SARS-COV-2) (CORONAVIRUS DISEASE [COVID-19]), AMPLIFIED PROBE TECHNIQUE, MAKING USE OF HIGH THROUGHPUT TECHNOLOGIES AS DESCRIBED BY CMS-2020-01-R: HCPCS | Performed by: INTERNAL MEDICINE

## 2022-04-11 PROCEDURE — U0005 INFEC AGEN DETEC AMPLI PROBE: HCPCS | Performed by: INTERNAL MEDICINE

## 2022-04-13 ENCOUNTER — HOSPITAL ENCOUNTER (OUTPATIENT)
Facility: HOSPITAL | Age: 62
Discharge: HOME | End: 2022-04-13
Attending: INTERNAL MEDICINE | Admitting: INTERNAL MEDICINE
Payer: MEDICAID

## 2022-04-13 VITALS
HEART RATE: 54 BPM | DIASTOLIC BLOOD PRESSURE: 54 MMHG | HEIGHT: 65 IN | OXYGEN SATURATION: 98 % | SYSTOLIC BLOOD PRESSURE: 108 MMHG | WEIGHT: 270 LBS | TEMPERATURE: 98 F | RESPIRATION RATE: 16 BRPM | BODY MASS INDEX: 44.98 KG/M2

## 2022-04-13 DIAGNOSIS — I34.0 SEVERE MITRAL REGURGITATION: ICD-10-CM

## 2022-04-13 DIAGNOSIS — I34.0 NONRHEUMATIC MITRAL VALVE REGURGITATION: Primary | ICD-10-CM

## 2022-04-13 DIAGNOSIS — Z98.890 STATUS POST LEFT HEART CATHETERIZATION: ICD-10-CM

## 2022-04-13 PROCEDURE — C1769 GUIDE WIRE: HCPCS | Performed by: INTERNAL MEDICINE

## 2022-04-13 PROCEDURE — C1887 CATHETER, GUIDING: HCPCS | Performed by: INTERNAL MEDICINE

## 2022-04-13 PROCEDURE — 99152 MOD SED SAME PHYS/QHP 5/>YRS: CPT | Mod: ,,, | Performed by: INTERNAL MEDICINE

## 2022-04-13 PROCEDURE — 93010 EKG 12-LEAD: ICD-10-PCS | Mod: ,,, | Performed by: INTERNAL MEDICINE

## 2022-04-13 PROCEDURE — C1894 INTRO/SHEATH, NON-LASER: HCPCS | Performed by: INTERNAL MEDICINE

## 2022-04-13 PROCEDURE — 99152 PR MOD CONSCIOUS SEDATION, SAME PHYS, 5+ YRS, FIRST 15 MIN: ICD-10-PCS | Mod: ,,, | Performed by: INTERNAL MEDICINE

## 2022-04-13 PROCEDURE — 25000003 PHARM REV CODE 250: Performed by: INTERNAL MEDICINE

## 2022-04-13 PROCEDURE — 93454 PR CATH PLACE/CORONARY ANGIO, IMG SUPER/INTERP: ICD-10-PCS | Mod: 26,,, | Performed by: INTERNAL MEDICINE

## 2022-04-13 PROCEDURE — 93454 CORONARY ARTERY ANGIO S&I: CPT | Performed by: INTERNAL MEDICINE

## 2022-04-13 PROCEDURE — 63600175 PHARM REV CODE 636 W HCPCS: Performed by: INTERNAL MEDICINE

## 2022-04-13 PROCEDURE — 93005 ELECTROCARDIOGRAM TRACING: CPT | Mod: 59

## 2022-04-13 PROCEDURE — 93454 CORONARY ARTERY ANGIO S&I: CPT | Mod: 26,,, | Performed by: INTERNAL MEDICINE

## 2022-04-13 PROCEDURE — 99152 MOD SED SAME PHYS/QHP 5/>YRS: CPT | Performed by: INTERNAL MEDICINE

## 2022-04-13 PROCEDURE — 93010 ELECTROCARDIOGRAM REPORT: CPT | Mod: ,,, | Performed by: INTERNAL MEDICINE

## 2022-04-13 PROCEDURE — 25500020 PHARM REV CODE 255: Performed by: INTERNAL MEDICINE

## 2022-04-13 RX ORDER — ACETAMINOPHEN 325 MG/1
650 TABLET ORAL EVERY 4 HOURS PRN
Status: DISCONTINUED | OUTPATIENT
Start: 2022-04-13 | End: 2022-04-13 | Stop reason: HOSPADM

## 2022-04-13 RX ORDER — SODIUM CHLORIDE 9 MG/ML
INJECTION, SOLUTION INTRAVENOUS CONTINUOUS
Status: ACTIVE | OUTPATIENT
Start: 2022-04-13 | End: 2022-04-13

## 2022-04-13 RX ORDER — FENTANYL CITRATE 50 UG/ML
INJECTION, SOLUTION INTRAMUSCULAR; INTRAVENOUS
Status: DISCONTINUED | OUTPATIENT
Start: 2022-04-13 | End: 2022-04-13 | Stop reason: HOSPADM

## 2022-04-13 RX ORDER — DIPHENHYDRAMINE HCL 50 MG
50 CAPSULE ORAL ONCE
Status: COMPLETED | OUTPATIENT
Start: 2022-04-13 | End: 2022-04-13

## 2022-04-13 RX ORDER — MIDAZOLAM HYDROCHLORIDE 1 MG/ML
INJECTION INTRAMUSCULAR; INTRAVENOUS
Status: DISCONTINUED | OUTPATIENT
Start: 2022-04-13 | End: 2022-04-13 | Stop reason: HOSPADM

## 2022-04-13 RX ORDER — ONDANSETRON 8 MG/1
8 TABLET, ORALLY DISINTEGRATING ORAL EVERY 8 HOURS PRN
Status: DISCONTINUED | OUTPATIENT
Start: 2022-04-13 | End: 2022-04-13 | Stop reason: HOSPADM

## 2022-04-13 RX ORDER — SODIUM CHLORIDE 9 MG/ML
INJECTION, SOLUTION INTRAVENOUS CONTINUOUS
Status: ACTIVE | OUTPATIENT
Start: 2022-04-13

## 2022-04-13 RX ORDER — HEPARIN SODIUM 1000 [USP'U]/ML
INJECTION, SOLUTION INTRAVENOUS; SUBCUTANEOUS
Status: DISCONTINUED | OUTPATIENT
Start: 2022-04-13 | End: 2022-04-13 | Stop reason: HOSPADM

## 2022-04-13 RX ORDER — LIDOCAINE HYDROCHLORIDE 20 MG/ML
INJECTION, SOLUTION EPIDURAL; INFILTRATION; INTRACAUDAL; PERINEURAL
Status: DISCONTINUED | OUTPATIENT
Start: 2022-04-13 | End: 2022-04-13 | Stop reason: HOSPADM

## 2022-04-13 RX ORDER — HEPARIN SOD,PORCINE/0.9 % NACL 1000/500ML
INTRAVENOUS SOLUTION INTRAVENOUS
Status: DISCONTINUED | OUTPATIENT
Start: 2022-04-13 | End: 2022-04-13 | Stop reason: HOSPADM

## 2022-04-13 RX ADMIN — DIPHENHYDRAMINE HYDROCHLORIDE 50 MG: 50 CAPSULE ORAL at 08:04

## 2022-04-13 NOTE — PLAN OF CARE
Patient arrived to room. PIV placed, pt a very difficult stick. Admit assessment completed. Plan of care discussed with patient. Will monitor. Call light within reach, instructed in use. Dr Perdomo stated ok not to draw T&S

## 2022-04-13 NOTE — PROGRESS NOTES
Report received from RAYMOND Alexander. Patient s/p [facility]. R radial vasc band intact. No bleeding or hematoma noted. Vss. Patient sleepy but arouses to voice. IVF infusing. Post procedure protocol discussed with patient and family. Call light in reach.

## 2022-04-13 NOTE — DISCHARGE SUMMARY
Discharge Summary  Interventional Cardiology      Admit Date: 4/13/2022    Discharge Date:  4/13/2022    Attending Physician: Isauro Perdomo MD    Discharge Physician: Dr. Haider    Principal Diagnoses: Severe MR    Indication for Admission: ANGIOGRAM, CORONARY ARTERY (N/A)    Discharged Condition: Good    Hospital Course:   Patient presented for outpatient coronary angiogram which went without complication. Coronary angiogram was nonobstructive. See full cath report in Epic for details. Hemostasis of patient's R Radial access site was achieved with VascBand. Patient was monitored per post-cath protocol, and her R radial access site was c/d/i with no hematoma. Patient was able to ambulate without difficulty. She was feeling well and anticipating discharge home today.     Outpatient Plan:  - There were no medication changes    Diet: Cardiac diet    Activity: As tolerated    Disposition: Home or Self Care    Follow Up: With outpatient Cardiology, RICK Rosas, and CTS      Discharge Medications:      Medication List      CONTINUE taking these medications    amLODIPine 5 MG tablet  Commonly known as: NORVASC     apixaban 5 mg Tab  Commonly known as: ELIQUIS     aspirin 81 MG EC tablet  Commonly known as: ECOTRIN     benazepriL 40 MG tablet  Commonly known as: LOTENSIN     ergocalciferol 50,000 unit Cap  Commonly known as: ERGOCALCIFEROL     famotidine 20 MG tablet  Commonly known as: PEPCID  Take 1 tablet (20 mg total) by mouth 2 (two) times daily.     fluticasone propionate 50 mcg/actuation nasal spray  Commonly known as: FLONASE     furosemide 20 MG tablet  Commonly known as: LASIX     hydroCHLOROthiazide 25 MG tablet  Commonly known as: HYDRODIURIL     metoprolol succinate 25 MG 24 hr tablet  Commonly known as: TOPROL-XL     omega-3 acid ethyl esters 1 gram capsule  Commonly known as: LOVAZA     omeprazole 40 MG capsule  Commonly known as: PRILOSEC  Take 1 capsule (40 mg total) by mouth every morning.      potassium chloride SA 20 MEQ tablet  Commonly known as: K-DUR,KLOR-CON        STOP taking these medications    meloxicam 15 MG tablet  Commonly known as: ELIZABETH Haider  Interventional Cardiology Fellow PGY-7  04/13/2022

## 2022-04-13 NOTE — BRIEF OP NOTE
Brief Operative Note:    : Isauro Perdomo MD     Referring Physician: Yonathan Brewer     All Operators: Surgeon(s):  MD Parth Jackman MD Khaldia Khaled, MD     Preoperative Diagnosis: Severe mitral regurgitation [I34.0]     Postop Diagnosis: Severe mitral regurgitation [I34.0]    Treatments/Procedures: Procedure(s) (LRB):  ANGIOGRAM, CORONARY ARTERY (N/A)    Access: R radial    Findings: Normal coronary arteries      See catheterization report for full details.    Intervention: None    See catheterization report for full details.    Closure device: Vasc band     Plan:  - Post cath protocol   - IVF @ 150 cc/kg/hr x 2 hours  - Continue high intensity statin therapy (LDL goal < 70)  - Risk factor reduction (BP <130/80 mmHg, glycemic control, etc)  - Follow up with outpatient cardiologist    Estimated Blood loss: 20 cc    Specimens removed: No    Parth Haider MD  Interventional Cardiology PGY-4  [unfilled]

## 2022-06-20 ENCOUNTER — TELEPHONE (OUTPATIENT)
Dept: CARDIOTHORACIC SURGERY | Facility: CLINIC | Age: 62
End: 2022-06-20
Payer: MEDICAID

## 2022-06-24 ENCOUNTER — TELEPHONE (OUTPATIENT)
Dept: CARDIOTHORACIC SURGERY | Facility: CLINIC | Age: 62
End: 2022-06-24
Payer: MEDICAID

## 2022-06-24 NOTE — TELEPHONE ENCOUNTER
Called pt to confirm we have her appointment with Dr. Morris rescheduled to next Thursday at 2:30 p.m. Pt verbalized understanding.

## 2022-06-29 NOTE — PROGRESS NOTES
Subjective:      Patient ID: Magalys Bass is a 61 y.o. female.    Chief Complaint: No chief complaint on file.      HPI:  Magalys Bass is a 61 y.o. female who presents for follow up MR/TR/Afib. Medical conditions include chronic diastolic HF (EF 60%), pAF (on Eliquis), HTN, HLD, obesity s/p gastric sleeve (2017). Saw Dr. Matute who has concerns for restrictive disease / amyloid due to echo findings. Patient states that she was diagnosed with a heart murmur ~ 2 years ago. States that she is not really SOB when walking on a flat surface but more so with stairs. States that she used to walk for exercise but has not been doing that, so feels deconditioning also plays a part in her breathing issues. Intermittent swelling in lower extremities. Reports energy level is not what it used to be. Denies any dizziness, orthopnea, chest pain, palpitations. No prior strokes, seizures, blood clots, stents, or sternotomies. Quit smoking 30 years ago. No significant drinking history.     At last visit 2/2/22 with Dr. Huang patient was felt to be a good surgical candidate as both her mitral and tricuspid valves could be correct, as well as her afib. However, there was some concern that the patient may have cardiac amyloidosis and a final decision was pending further workup. The amyloid testing resulted negative. Here today to discuss next steps. Reports there has been no change in her symptoms.     Family and social history reviewed   Current medications Reviewed    Review of Systems   Constitutional: Negative for fatigue.   HENT: Negative for nosebleeds.    Eyes: Negative for visual disturbance.   Respiratory: Positive for shortness of breath (with strenuous activity ).    Cardiovascular: Negative for chest pain.   Gastrointestinal: Negative for nausea.   Musculoskeletal: Negative for gait problem.   Skin: Negative for color change.   Neurological: Negative for dizziness.   Hematological: Does not bruise/bleed easily.    Psychiatric/Behavioral: Negative for sleep disturbance.     Objective:   Physical Exam  Constitutional:       General: She is not in acute distress.     Appearance: She is obese.   HENT:      Head: Normocephalic and atraumatic.   Eyes:      Pupils: Pupils are equal, round, and reactive to light.   Cardiovascular:      Rate and Rhythm: Normal rate.   Pulmonary:      Effort: Pulmonary effort is normal. No respiratory distress.   Abdominal:      General: There is no distension.   Musculoskeletal:         General: Normal range of motion.      Cervical back: Normal range of motion.   Skin:     Coloration: Skin is not pale.   Neurological:      General: No focal deficit present.      Mental Status: She is alert.   Psychiatric:         Mood and Affect: Mood normal.         Diagnotic Results:reviewed  [facility] 4/13/22  · The estimated blood loss was none.  · There was non-obstructive coronary artery disease..  · No significant CAD.    PYP Amyloidosis 3/23/22  · Planar imaging demonstrates cardiac activity that is absent to that of the adjacent rib cage.  · SPECT imaging shows blood pool activity.  · Study is negative for TTR amyloidosis.  · Evaluation for Amyloidosis by serum FLCs, serum and urine immunofixation is recommended in all patients undergoing TC99- PYP scan for cardiac amyloidosis.  · Results should be interpreted in the context of prior evaluation and referral to a hematologist and/or Amyloidosis expert is recommended if either, (a) echo or CMR is strongly suggestive of cardiac amyloidosis and the TC99-PYP scan is not suggestive and/or equivicoal and/or (b) FLCs are abnormal or equivocal.    TTE 3/15/22  · The left ventricle is normal in size with normal systolic function. The estimated ejection fraction is 60%.  · Mild right ventricular enlargement with normal right ventricular systolic function.  · Grade III left ventricular diastolic dysfunction.  · Severe biatrial enlargement.  · Severe central mitral  regurgitation. Using clips 97, 98: ERO = 0.48, RVol = 88  · Severe tricuspid regurgitation. VC = 0.7 with systolic hepatic vein reversal and dense, triangular jet on CW doppler.  · The estimated PA systolic pressure is 58 mmHg.  · Elevated central venous pressure (15 mmHg).      TTE 2/11/22  · The estimated ejection fraction is 60%.  · The left ventricle is normal in size with normal systolic function.  · Grade III left ventricular diastolic dysfunction.  · Normal right ventricular size with normal right ventricular systolic function.  · Severe left atrial enlargement.  · Moderate right atrial enlargement.  · Severe mitral regurgitation.  · Moderate to severe tricuspid regurgitation.  · There is pulmonary hypertension.  · The estimated PA systolic pressure is 50 mmHg.  · Normal central venous pressure (3 mmHg).  · LV 5.5  · TAPSE 1.8    Assessment:   MR/TR  Afib   Plan:     CTS Attending Note:    I have personally taken the history and examined this patient and agree with the VIDA's note as stated above.  Very pleasant 61-year-old woman with severe mitral and tricuspid regurgitation.  She also has atrial fibrillation as well as pulmonary hypertension.  She is symptomatic for this.  There had been concern for amyloidosis, but she has undergone a thorough laboratory and imaging evaluation and the results are negative.  I recommended mitral valve repair, tricuspid valve repair, and Maze procedure.  She agreed with this.  We discussed the risks and benefits of the proposed procedure, including but not limited to death, stroke, respiratory complications (increased due to both MR and TR), renal complications, arrythmia, need for permanent pacemaker, and infection. Her questions have been answered, and she wishes to proceed. After a discussion of the advantages and disadvantages of tissue and mechanical prostheses, she indicated that she desires a mechanical valve should repair not be feasible.

## 2022-06-30 ENCOUNTER — OFFICE VISIT (OUTPATIENT)
Dept: CARDIOTHORACIC SURGERY | Facility: CLINIC | Age: 62
End: 2022-06-30
Payer: MEDICAID

## 2022-06-30 VITALS
HEIGHT: 65 IN | OXYGEN SATURATION: 100 % | HEART RATE: 80 BPM | WEIGHT: 288.69 LBS | SYSTOLIC BLOOD PRESSURE: 143 MMHG | DIASTOLIC BLOOD PRESSURE: 77 MMHG | BODY MASS INDEX: 48.1 KG/M2

## 2022-06-30 DIAGNOSIS — I48.0 PAROXYSMAL ATRIAL FIBRILLATION: Primary | ICD-10-CM

## 2022-06-30 DIAGNOSIS — I34.0 NONRHEUMATIC MITRAL VALVE REGURGITATION: ICD-10-CM

## 2022-06-30 DIAGNOSIS — I36.1 NONRHEUMATIC TRICUSPID VALVE REGURGITATION: ICD-10-CM

## 2022-06-30 PROCEDURE — 3078F PR MOST RECENT DIASTOLIC BLOOD PRESSURE < 80 MM HG: ICD-10-PCS | Mod: CPTII,,, | Performed by: THORACIC SURGERY (CARDIOTHORACIC VASCULAR SURGERY)

## 2022-06-30 PROCEDURE — 3008F BODY MASS INDEX DOCD: CPT | Mod: CPTII,,, | Performed by: THORACIC SURGERY (CARDIOTHORACIC VASCULAR SURGERY)

## 2022-06-30 PROCEDURE — 99215 OFFICE O/P EST HI 40 MIN: CPT | Mod: S$PBB,,, | Performed by: THORACIC SURGERY (CARDIOTHORACIC VASCULAR SURGERY)

## 2022-06-30 PROCEDURE — 99213 OFFICE O/P EST LOW 20 MIN: CPT | Mod: PBBFAC | Performed by: THORACIC SURGERY (CARDIOTHORACIC VASCULAR SURGERY)

## 2022-06-30 PROCEDURE — 3077F SYST BP >= 140 MM HG: CPT | Mod: CPTII,,, | Performed by: THORACIC SURGERY (CARDIOTHORACIC VASCULAR SURGERY)

## 2022-06-30 PROCEDURE — 3077F PR MOST RECENT SYSTOLIC BLOOD PRESSURE >= 140 MM HG: ICD-10-PCS | Mod: CPTII,,, | Performed by: THORACIC SURGERY (CARDIOTHORACIC VASCULAR SURGERY)

## 2022-06-30 PROCEDURE — 1159F PR MEDICATION LIST DOCUMENTED IN MEDICAL RECORD: ICD-10-PCS | Mod: CPTII,,, | Performed by: THORACIC SURGERY (CARDIOTHORACIC VASCULAR SURGERY)

## 2022-06-30 PROCEDURE — 99999 PR PBB SHADOW E&M-EST. PATIENT-LVL III: CPT | Mod: PBBFAC,,, | Performed by: THORACIC SURGERY (CARDIOTHORACIC VASCULAR SURGERY)

## 2022-06-30 PROCEDURE — 3008F PR BODY MASS INDEX (BMI) DOCUMENTED: ICD-10-PCS | Mod: CPTII,,, | Performed by: THORACIC SURGERY (CARDIOTHORACIC VASCULAR SURGERY)

## 2022-06-30 PROCEDURE — 99215 PR OFFICE/OUTPT VISIT, EST, LEVL V, 40-54 MIN: ICD-10-PCS | Mod: S$PBB,,, | Performed by: THORACIC SURGERY (CARDIOTHORACIC VASCULAR SURGERY)

## 2022-06-30 PROCEDURE — 99999 PR PBB SHADOW E&M-EST. PATIENT-LVL III: ICD-10-PCS | Mod: PBBFAC,,, | Performed by: THORACIC SURGERY (CARDIOTHORACIC VASCULAR SURGERY)

## 2022-06-30 PROCEDURE — 3078F DIAST BP <80 MM HG: CPT | Mod: CPTII,,, | Performed by: THORACIC SURGERY (CARDIOTHORACIC VASCULAR SURGERY)

## 2022-06-30 PROCEDURE — 1159F MED LIST DOCD IN RCRD: CPT | Mod: CPTII,,, | Performed by: THORACIC SURGERY (CARDIOTHORACIC VASCULAR SURGERY)

## 2022-06-30 NOTE — LETTER
June 30, 2022        Winifred Matute MD  [address]  [facility] [address]             [facility]  [address]  [facility] [address]  Phone: [phone]   Patient: Magalys Bass   MR Number: [id number]   YOB: 1960   Date of Visit: 6/30/2022       Dear Dr. Matute:    Thank you for referring Magalys Bass to me for evaluation. Below are the relevant portions of my assessment and plan of care.            If you have questions, please do not hesitate to call me. I look forward to following Magalys along with you.    Sincerely,      Manish Morris MD           CC  Isauro Perdomo MD

## 2022-07-06 DIAGNOSIS — I34.0 NONRHEUMATIC MITRAL VALVE REGURGITATION: ICD-10-CM

## 2022-07-06 DIAGNOSIS — I36.1 NONRHEUMATIC TRICUSPID VALVE REGURGITATION: ICD-10-CM

## 2022-07-06 DIAGNOSIS — I48.0 PAROXYSMAL ATRIAL FIBRILLATION: Primary | ICD-10-CM

## 2022-07-06 DIAGNOSIS — Z01.818 PRE-OP TESTING: ICD-10-CM

## 2022-07-13 ENCOUNTER — TELEPHONE (OUTPATIENT)
Dept: CARDIOTHORACIC SURGERY | Facility: CLINIC | Age: 62
End: 2022-07-13
Payer: MEDICAID

## 2022-07-13 DIAGNOSIS — Z01.818 PRE-OP TESTING: Primary | ICD-10-CM

## 2022-07-13 NOTE — TELEPHONE ENCOUNTER
Called pt to review pre-op testing appointments which have been scheduled for Monday, August 8 and Tuesday, August 9. Reviewed pt medications. Pt will need to take last dose of Eliquis on Tuesday, 8/2 and last doses of amlodipine and benazepril on Saturday, 8/6. Will mail appointment slips to pt. Pt verbalized understanding of all information.

## 2022-08-02 ENCOUNTER — TELEPHONE (OUTPATIENT)
Dept: CARDIOTHORACIC SURGERY | Facility: CLINIC | Age: 62
End: 2022-08-02
Payer: MEDICAID

## 2022-08-02 NOTE — TELEPHONE ENCOUNTER
Attempted call to pt to remind her of last dose of eliquis which should be taken today. No answer, left VM with request for call back and provided my direct line.

## 2022-08-03 ENCOUNTER — TELEPHONE (OUTPATIENT)
Dept: CARDIOTHORACIC SURGERY | Facility: CLINIC | Age: 62
End: 2022-08-03
Payer: MEDICAID

## 2022-08-05 ENCOUNTER — TELEPHONE (OUTPATIENT)
Dept: CARDIOTHORACIC SURGERY | Facility: CLINIC | Age: 62
End: 2022-08-05
Payer: MEDICAID

## 2022-08-05 NOTE — TELEPHONE ENCOUNTER
Called and reminded pt that tomorrow is the last day for her to take Amlodipine and Benazepril, per Dr. Morris, in preparation for surgery next week, which pt verbalized understanding to.

## 2022-08-05 NOTE — TELEPHONE ENCOUNTER
Attempted call to pt to remind her to take last dose of amlodipine and benazepril tomorrow, August 6. No answer, left detailed VM and provided call back number.

## 2022-08-09 ENCOUNTER — HOSPITAL ENCOUNTER (OUTPATIENT)
Dept: CARDIOLOGY | Facility: CLINIC | Age: 62
Discharge: HOME | DRG: 220 | End: 2022-08-09
Payer: MEDICAID

## 2022-08-09 ENCOUNTER — HOSPITAL ENCOUNTER (OUTPATIENT)
Dept: RADIOLOGY | Facility: HOSPITAL | Age: 62
Discharge: HOME | DRG: 220 | End: 2022-08-09
Attending: THORACIC SURGERY (CARDIOTHORACIC VASCULAR SURGERY)
Payer: MEDICAID

## 2022-08-09 ENCOUNTER — HOSPITAL ENCOUNTER (OUTPATIENT)
Dept: VASCULAR SURGERY | Facility: CLINIC | Age: 62
Discharge: HOME | DRG: 220 | End: 2022-08-09
Attending: THORACIC SURGERY (CARDIOTHORACIC VASCULAR SURGERY)
Payer: MEDICAID

## 2022-08-09 ENCOUNTER — TELEPHONE (OUTPATIENT)
Dept: CARDIOTHORACIC SURGERY | Facility: CLINIC | Age: 62
End: 2022-08-09
Payer: MEDICAID

## 2022-08-09 ENCOUNTER — OFFICE VISIT (OUTPATIENT)
Dept: CARDIOTHORACIC SURGERY | Facility: CLINIC | Age: 62
DRG: 220 | End: 2022-08-09
Payer: MEDICAID

## 2022-08-09 ENCOUNTER — HOSPITAL ENCOUNTER (OUTPATIENT)
Dept: CARDIOLOGY | Facility: HOSPITAL | Age: 62
Discharge: HOME | DRG: 220 | End: 2022-08-09
Attending: THORACIC SURGERY (CARDIOTHORACIC VASCULAR SURGERY)
Payer: MEDICAID

## 2022-08-09 ENCOUNTER — HOSPITAL ENCOUNTER (OUTPATIENT)
Dept: PULMONOLOGY | Facility: CLINIC | Age: 62
Discharge: HOME | DRG: 220 | End: 2022-08-09
Payer: MEDICAID

## 2022-08-09 ENCOUNTER — ANESTHESIA EVENT (OUTPATIENT)
Dept: SURGERY | Facility: HOSPITAL | Age: 62
DRG: 220 | End: 2022-08-09
Payer: MEDICAID

## 2022-08-09 VITALS
BODY MASS INDEX: 46.2 KG/M2 | SYSTOLIC BLOOD PRESSURE: 142 MMHG | TEMPERATURE: 98 F | OXYGEN SATURATION: 100 % | DIASTOLIC BLOOD PRESSURE: 67 MMHG | WEIGHT: 277.31 LBS | HEART RATE: 57 BPM | HEIGHT: 65 IN

## 2022-08-09 VITALS
HEART RATE: 56 BPM | DIASTOLIC BLOOD PRESSURE: 85 MMHG | SYSTOLIC BLOOD PRESSURE: 135 MMHG | WEIGHT: 288 LBS | HEIGHT: 65 IN | BODY MASS INDEX: 47.98 KG/M2

## 2022-08-09 DIAGNOSIS — Z01.818 PRE-OP TESTING: ICD-10-CM

## 2022-08-09 DIAGNOSIS — I34.0 NONRHEUMATIC MITRAL VALVE REGURGITATION: ICD-10-CM

## 2022-08-09 DIAGNOSIS — I48.0 PAROXYSMAL ATRIAL FIBRILLATION: ICD-10-CM

## 2022-08-09 DIAGNOSIS — I36.1 NONRHEUMATIC TRICUSPID VALVE REGURGITATION: ICD-10-CM

## 2022-08-09 DIAGNOSIS — Z13.9 SCREENING PROCEDURE: Primary | ICD-10-CM

## 2022-08-09 DIAGNOSIS — I65.23 BILATERAL CAROTID ARTERY STENOSIS: ICD-10-CM

## 2022-08-09 DIAGNOSIS — I36.1 NONRHEUMATIC TRICUSPID VALVE REGURGITATION: Primary | ICD-10-CM

## 2022-08-09 LAB
ASCENDING AORTA: 3.18 CM
AV INDEX (PROSTH): 0.91
AV MEAN GRADIENT: 2 MMHG
AV PEAK GRADIENT: 6 MMHG
AV VALVE AREA: 2.62 CM2
AV VELOCITY RATIO: 0.77
BSA FOR ECHO PROCEDURE: 2.45 M2
CTP QC/QA: YES
CV ECHO LV RWT: 0.43 CM
DOP CALC AO PEAK VEL: 1.18 M/S
DOP CALC AO VTI: 21.71 CM
DOP CALC LVOT AREA: 2.9 CM2
DOP CALC LVOT DIAMETER: 1.92 CM
DOP CALC LVOT PEAK VEL: 0.91 M/S
DOP CALC LVOT STROKE VOLUME: 56.98 CM3
DOP CALCLVOT PEAK VEL VTI: 19.69 CM
ECHO LV POSTERIOR WALL: 1.03 CM (ref 0.6–1.1)
EJECTION FRACTION: 58 %
FRACTIONAL SHORTENING: 36 % (ref 28–44)
INTERVENTRICULAR SEPTUM: 0.82 CM (ref 0.6–1.1)
LA MAJOR: 8.2 CM
LA MINOR: 8.16 CM
LA WIDTH: 6.19 CM
LEFT ATRIUM SIZE: 5.69 CM
LEFT ATRIUM VOLUME INDEX MOD: 92.1 ML/M2
LEFT ATRIUM VOLUME INDEX: 106 ML/M2
LEFT ATRIUM VOLUME MOD: 212.68 CM3
LEFT ATRIUM VOLUME: 244.89 CM3
LEFT INTERNAL DIMENSION IN SYSTOLE: 3.06 CM (ref 2.1–4)
LEFT VENTRICLE DIASTOLIC VOLUME INDEX: 46.13 ML/M2
LEFT VENTRICLE DIASTOLIC VOLUME: 106.56 ML
LEFT VENTRICLE MASS INDEX: 66 G/M2
LEFT VENTRICLE SYSTOLIC VOLUME INDEX: 16 ML/M2
LEFT VENTRICLE SYSTOLIC VOLUME: 36.88 ML
LEFT VENTRICULAR INTERNAL DIMENSION IN DIASTOLE: 4.78 CM (ref 3.5–6)
LEFT VENTRICULAR MASS: 152.21 G
MV PEAK E VEL: 1.52 M/S
PISA MRMAX VEL: 0.06 M/S
PISA RADIUS: 0.47 CM
PISA TR MAX VEL: 2.79 M/S
PISA TR VN NYQUIST: 0 M/S
RA MAJOR: 6.59 CM
RA PRESSURE: 3 MMHG
RA WIDTH: 4.8 CM
RIGHT VENTRICULAR END-DIASTOLIC DIMENSION: 5.13 CM
SARS-COV-2 AG RESP QL IA.RAPID: NEGATIVE
SINUS: 2.75 CM
STJ: 2.37 CM
TR MAX PG: 31 MMHG
TRICUSPID ANNULAR PLANE SYSTOLIC EXCURSION: 1.59 CM
TV REST PULMONARY ARTERY PRESSURE: 34 MMHG

## 2022-08-09 PROCEDURE — 93880 EXTRACRANIAL BILAT STUDY: CPT | Mod: 26,S$PBB,, | Performed by: SURGERY

## 2022-08-09 PROCEDURE — 87811 SARS-COV-2 COVID19 W/OPTIC: CPT | Mod: PBBFAC

## 2022-08-09 PROCEDURE — 71046 X-RAY EXAM CHEST 2 VIEWS: CPT | Mod: 26,,, | Performed by: RADIOLOGY

## 2022-08-09 PROCEDURE — 94010 BREATHING CAPACITY TEST: ICD-10-PCS | Mod: 26,S$PBB,, | Performed by: INTERNAL MEDICINE

## 2022-08-09 PROCEDURE — 71046 X-RAY EXAM CHEST 2 VIEWS: CPT | Mod: TC,FY

## 2022-08-09 PROCEDURE — 71046 XR CHEST PA AND LATERAL PRE-OP: ICD-10-PCS | Mod: 26,,, | Performed by: RADIOLOGY

## 2022-08-09 PROCEDURE — 3008F PR BODY MASS INDEX (BMI) DOCUMENTED: ICD-10-PCS | Mod: CPTII,,, | Performed by: THORACIC SURGERY (CARDIOTHORACIC VASCULAR SURGERY)

## 2022-08-09 PROCEDURE — 94729 DIFFUSING CAPACITY: CPT | Mod: PBBFAC | Performed by: INTERNAL MEDICINE

## 2022-08-09 PROCEDURE — 3078F DIAST BP <80 MM HG: CPT | Mod: CPTII,,, | Performed by: THORACIC SURGERY (CARDIOTHORACIC VASCULAR SURGERY)

## 2022-08-09 PROCEDURE — 99214 OFFICE O/P EST MOD 30 MIN: CPT | Mod: PBBFAC,25 | Performed by: THORACIC SURGERY (CARDIOTHORACIC VASCULAR SURGERY)

## 2022-08-09 PROCEDURE — 93010 ELECTROCARDIOGRAM REPORT: CPT | Mod: S$PBB,,, | Performed by: INTERNAL MEDICINE

## 2022-08-09 PROCEDURE — 93880 PR DUPLEX SCAN EXTRACRANIAL,BILAT: ICD-10-PCS | Mod: 26,S$PBB,, | Performed by: SURGERY

## 2022-08-09 PROCEDURE — 93306 ECHO (CUPID ONLY): ICD-10-PCS | Mod: 26,,, | Performed by: INTERNAL MEDICINE

## 2022-08-09 PROCEDURE — 1159F MED LIST DOCD IN RCRD: CPT | Mod: CPTII,,, | Performed by: THORACIC SURGERY (CARDIOTHORACIC VASCULAR SURGERY)

## 2022-08-09 PROCEDURE — 93005 ELECTROCARDIOGRAM TRACING: CPT | Mod: PBBFAC | Performed by: INTERNAL MEDICINE

## 2022-08-09 PROCEDURE — 3077F SYST BP >= 140 MM HG: CPT | Mod: CPTII,,, | Performed by: THORACIC SURGERY (CARDIOTHORACIC VASCULAR SURGERY)

## 2022-08-09 PROCEDURE — 99999 PR PBB SHADOW E&M-EST. PATIENT-LVL IV: CPT | Mod: PBBFAC,,, | Performed by: THORACIC SURGERY (CARDIOTHORACIC VASCULAR SURGERY)

## 2022-08-09 PROCEDURE — 3077F PR MOST RECENT SYSTOLIC BLOOD PRESSURE >= 140 MM HG: ICD-10-PCS | Mod: CPTII,,, | Performed by: THORACIC SURGERY (CARDIOTHORACIC VASCULAR SURGERY)

## 2022-08-09 PROCEDURE — 99499 NO LOS: ICD-10-PCS | Mod: S$PBB,,, | Performed by: THORACIC SURGERY (CARDIOTHORACIC VASCULAR SURGERY)

## 2022-08-09 PROCEDURE — 94010 BREATHING CAPACITY TEST: CPT | Mod: 26,S$PBB,, | Performed by: INTERNAL MEDICINE

## 2022-08-09 PROCEDURE — 93306 TTE W/DOPPLER COMPLETE: CPT

## 2022-08-09 PROCEDURE — 94729 DIFFUSING CAPACITY: CPT | Mod: 26,S$PBB,, | Performed by: INTERNAL MEDICINE

## 2022-08-09 PROCEDURE — 3008F BODY MASS INDEX DOCD: CPT | Mod: CPTII,,, | Performed by: THORACIC SURGERY (CARDIOTHORACIC VASCULAR SURGERY)

## 2022-08-09 PROCEDURE — 99499 UNLISTED E&M SERVICE: CPT | Mod: S$PBB,,, | Performed by: THORACIC SURGERY (CARDIOTHORACIC VASCULAR SURGERY)

## 2022-08-09 PROCEDURE — 99999 PR PBB SHADOW E&M-EST. PATIENT-LVL IV: ICD-10-PCS | Mod: PBBFAC,,, | Performed by: THORACIC SURGERY (CARDIOTHORACIC VASCULAR SURGERY)

## 2022-08-09 PROCEDURE — 3044F PR MOST RECENT HEMOGLOBIN A1C LEVEL <7.0%: ICD-10-PCS | Mod: CPTII,,, | Performed by: THORACIC SURGERY (CARDIOTHORACIC VASCULAR SURGERY)

## 2022-08-09 PROCEDURE — 1159F PR MEDICATION LIST DOCUMENTED IN MEDICAL RECORD: ICD-10-PCS | Mod: CPTII,,, | Performed by: THORACIC SURGERY (CARDIOTHORACIC VASCULAR SURGERY)

## 2022-08-09 PROCEDURE — 3044F HG A1C LEVEL LT 7.0%: CPT | Mod: CPTII,,, | Performed by: THORACIC SURGERY (CARDIOTHORACIC VASCULAR SURGERY)

## 2022-08-09 PROCEDURE — 94010 BREATHING CAPACITY TEST: CPT | Mod: PBBFAC | Performed by: INTERNAL MEDICINE

## 2022-08-09 PROCEDURE — 93010 EKG 12-LEAD: ICD-10-PCS | Mod: S$PBB,,, | Performed by: INTERNAL MEDICINE

## 2022-08-09 PROCEDURE — 94729 PR C02/MEMBANE DIFFUSE CAPACITY: ICD-10-PCS | Mod: 26,S$PBB,, | Performed by: INTERNAL MEDICINE

## 2022-08-09 PROCEDURE — 93880 EXTRACRANIAL BILAT STUDY: CPT | Mod: PBBFAC | Performed by: SURGERY

## 2022-08-09 PROCEDURE — 93306 TTE W/DOPPLER COMPLETE: CPT | Mod: 26,,, | Performed by: INTERNAL MEDICINE

## 2022-08-09 PROCEDURE — 3078F PR MOST RECENT DIASTOLIC BLOOD PRESSURE < 80 MM HG: ICD-10-PCS | Mod: CPTII,,, | Performed by: THORACIC SURGERY (CARDIOTHORACIC VASCULAR SURGERY)

## 2022-08-09 RX ORDER — METOCLOPRAMIDE HYDROCHLORIDE 5 MG/ML
5 INJECTION INTRAMUSCULAR; INTRAVENOUS EVERY 6 HOURS PRN
Status: CANCELLED | OUTPATIENT
Start: 2022-08-09

## 2022-08-09 RX ORDER — PROPOFOL 10 MG/ML
0-50 INJECTION, EMULSION INTRAVENOUS CONTINUOUS
Status: CANCELLED | OUTPATIENT
Start: 2022-08-09

## 2022-08-09 RX ORDER — FENTANYL CITRATE 50 UG/ML
25 INJECTION, SOLUTION INTRAMUSCULAR; INTRAVENOUS
Status: CANCELLED | OUTPATIENT
Start: 2022-08-09 | End: 2022-08-11

## 2022-08-09 RX ORDER — ALBUMIN HUMAN 50 G/1000ML
25 SOLUTION INTRAVENOUS ONCE AS NEEDED
Status: CANCELLED | OUTPATIENT
Start: 2022-08-09 | End: 2034-01-05

## 2022-08-09 RX ORDER — DOCUSATE SODIUM 100 MG/1
100 CAPSULE, LIQUID FILLED ORAL 2 TIMES DAILY
Status: CANCELLED | OUTPATIENT
Start: 2022-08-09

## 2022-08-09 RX ORDER — FENTANYL CITRATE 50 UG/ML
50 INJECTION, SOLUTION INTRAMUSCULAR; INTRAVENOUS
Status: CANCELLED | OUTPATIENT
Start: 2022-08-12

## 2022-08-09 RX ORDER — ASPIRIN 300 MG/1
300 SUPPOSITORY RECTAL ONCE AS NEEDED
Status: CANCELLED | OUTPATIENT
Start: 2022-08-09 | End: 2034-01-05

## 2022-08-09 RX ORDER — ASPIRIN 325 MG
325 TABLET ORAL ONCE
Status: CANCELLED | OUTPATIENT
Start: 2022-08-09 | End: 2022-08-09

## 2022-08-09 RX ORDER — ONDANSETRON 2 MG/ML
4 INJECTION INTRAMUSCULAR; INTRAVENOUS EVERY 12 HOURS PRN
Status: CANCELLED | OUTPATIENT
Start: 2022-08-09

## 2022-08-09 RX ORDER — CEFAZOLIN SODIUM/D5W 2 G/100 ML
2 PLASTIC BAG, INJECTION (ML) INTRAVENOUS
Status: CANCELLED | OUTPATIENT
Start: 2022-08-09 | End: 2022-08-11

## 2022-08-09 RX ORDER — DEXTROSE MONOHYDRATE, SODIUM CHLORIDE, AND POTASSIUM CHLORIDE 50; 1.49; 4.5 G/1000ML; G/1000ML; G/1000ML
INJECTION, SOLUTION INTRAVENOUS CONTINUOUS
Status: CANCELLED | OUTPATIENT
Start: 2022-08-09

## 2022-08-09 RX ORDER — MAGNESIUM SULFATE HEPTAHYDRATE 40 MG/ML
2 INJECTION, SOLUTION INTRAVENOUS
Status: CANCELLED | OUTPATIENT
Start: 2022-08-09

## 2022-08-09 RX ORDER — MAGNESIUM SULFATE HEPTAHYDRATE 40 MG/ML
4 INJECTION, SOLUTION INTRAVENOUS
Status: CANCELLED | OUTPATIENT
Start: 2022-08-09

## 2022-08-09 RX ORDER — ATORVASTATIN CALCIUM 40 MG/1
40 TABLET, FILM COATED ORAL NIGHTLY
Status: CANCELLED | OUTPATIENT
Start: 2022-08-09

## 2022-08-09 RX ORDER — ACETAMINOPHEN 325 MG/1
650 TABLET ORAL EVERY 4 HOURS PRN
Status: CANCELLED | OUTPATIENT
Start: 2022-08-09

## 2022-08-09 RX ORDER — POTASSIUM CHLORIDE 14.9 MG/ML
60 INJECTION INTRAVENOUS
Status: CANCELLED | OUTPATIENT
Start: 2022-08-09

## 2022-08-09 RX ORDER — SODIUM CHLORIDE 9 MG/ML
INJECTION, SOLUTION INTRAVENOUS CONTINUOUS
Status: CANCELLED | OUTPATIENT
Start: 2022-08-09

## 2022-08-09 RX ORDER — LIDOCAINE HYDROCHLORIDE 10 MG/ML
1 INJECTION, SOLUTION EPIDURAL; INFILTRATION; INTRACAUDAL; PERINEURAL
Status: CANCELLED | OUTPATIENT
Start: 2022-08-09

## 2022-08-09 RX ORDER — FENTANYL CITRATE 50 UG/ML
25 INJECTION, SOLUTION INTRAMUSCULAR; INTRAVENOUS
Status: CANCELLED | OUTPATIENT
Start: 2022-08-09

## 2022-08-09 RX ORDER — SODIUM CHLORIDE 0.9 % (FLUSH) 0.9 %
10 SYRINGE (ML) INJECTION
Status: CANCELLED | OUTPATIENT
Start: 2022-08-09

## 2022-08-09 RX ORDER — POTASSIUM CHLORIDE 20 MEQ/1
20 TABLET, EXTENDED RELEASE ORAL EVERY 12 HOURS
Status: CANCELLED | OUTPATIENT
Start: 2022-08-09

## 2022-08-09 RX ORDER — IPRATROPIUM BROMIDE AND ALBUTEROL SULFATE 2.5; .5 MG/3ML; MG/3ML
3 SOLUTION RESPIRATORY (INHALATION) EVERY 4 HOURS PRN
Status: CANCELLED | OUTPATIENT
Start: 2022-08-09 | End: 2022-08-10

## 2022-08-09 RX ORDER — BISACODYL 10 MG
10 SUPPOSITORY, RECTAL RECTAL DAILY PRN
Status: CANCELLED | OUTPATIENT
Start: 2022-08-09

## 2022-08-09 RX ORDER — ACETAMINOPHEN 500 MG
1000 TABLET ORAL
Status: CANCELLED | OUTPATIENT
Start: 2022-08-10 | End: 2022-08-10

## 2022-08-09 RX ORDER — ASPIRIN 325 MG
325 TABLET, DELAYED RELEASE (ENTERIC COATED) ORAL DAILY
Status: CANCELLED | OUTPATIENT
Start: 2022-08-09

## 2022-08-09 RX ORDER — MUPIROCIN 20 MG/G
1 OINTMENT TOPICAL
Status: CANCELLED | OUTPATIENT
Start: 2022-08-09

## 2022-08-09 RX ORDER — IPRATROPIUM BROMIDE AND ALBUTEROL SULFATE 2.5; .5 MG/3ML; MG/3ML
3 SOLUTION RESPIRATORY (INHALATION) EVERY 4 HOURS
Status: CANCELLED | OUTPATIENT
Start: 2022-08-09 | End: 2022-08-10

## 2022-08-09 RX ORDER — ASPIRIN 325 MG
325 TABLET ORAL DAILY
Status: CANCELLED | OUTPATIENT
Start: 2022-08-09

## 2022-08-09 RX ORDER — FAMOTIDINE 20 MG/1
20 TABLET, FILM COATED ORAL 2 TIMES DAILY
Status: CANCELLED | OUTPATIENT
Start: 2022-08-09

## 2022-08-09 RX ORDER — POLYETHYLENE GLYCOL 3350 17 G/17G
17 POWDER, FOR SOLUTION ORAL DAILY
Status: CANCELLED | OUTPATIENT
Start: 2022-08-09

## 2022-08-09 RX ORDER — POTASSIUM CHLORIDE 14.9 MG/ML
20 INJECTION INTRAVENOUS
Status: CANCELLED | OUTPATIENT
Start: 2022-08-09

## 2022-08-09 RX ORDER — POTASSIUM CHLORIDE 29.8 MG/ML
40 INJECTION INTRAVENOUS
Status: CANCELLED | OUTPATIENT
Start: 2022-08-09

## 2022-08-09 RX ORDER — FAMOTIDINE 10 MG/ML
20 INJECTION INTRAVENOUS 2 TIMES DAILY
Status: CANCELLED | OUTPATIENT
Start: 2022-08-09

## 2022-08-09 RX ORDER — MUPIROCIN 20 MG/G
1 OINTMENT TOPICAL 2 TIMES DAILY
Status: CANCELLED | OUTPATIENT
Start: 2022-08-09 | End: 2022-08-14

## 2022-08-09 RX ORDER — METOPROLOL TARTRATE 25 MG/1
25 TABLET, FILM COATED ORAL
Status: CANCELLED | OUTPATIENT
Start: 2022-08-09

## 2022-08-09 NOTE — TELEPHONE ENCOUNTER
Called pt to see if she was on the way to the hospital for pre-op testing appointments. Pt states she is unaware of her appointments. Appointment slips were mailed to pt and spoke with pt several times on previous dates to discuss pre-op appointments and medication reminders for surgery (see documented notes). Pt states she will come now. Will leave copy of appointment slips at  of this clinic and instructed pt to proceed to the third floor for her first appointment. Pt verbalized understanding.

## 2022-08-09 NOTE — PROGRESS NOTES
Subjective:      Patient ID: Magalys Bass is a 61 y.o. female.     Chief Complaint: No chief complaint on file.        HPI:  Magalys Bass is a 61 y.o. female who presents for pre-op MVr/TVr/MAZE. Medical conditions include chronic diastolic HF (EF 60%), pAF (on Eliquis), HTN, HLD, obesity s/p gastric sleeve (2017). Saw Dr. Matute who has concerns for restrictive disease / amyloid due to echo findings. Patient states that she was diagnosed with a heart murmur ~ 2 years ago. States that she is not really SOB when walking on a flat surface but more so with stairs. States that she used to walk for exercise but has not been doing that, so feels deconditioning also plays a part in her breathing issues. Intermittent swelling in lower extremities. Reports energy level is not what it used to be. Denies any dizziness, orthopnea, chest pain, palpitations. No prior strokes, seizures, blood clots, stents, or sternotomies. Quit smoking 30 years ago. No significant drinking history.      At last visit 2/2/22 with Dr. Huang patient was felt to be a good surgical candidate as both her mitral and tricuspid valves could be correct, as well as her afib. However, there was some concern that the patient may have cardiac amyloidosis and a final decision was pending further workup. The amyloid testing resulted negative. Here today to discuss next steps. Reports there has been no change in her symptoms.      Family and social history reviewed   Current medications Reviewed     Review of Systems   Constitutional: Negative for fatigue.   HENT: Negative for nosebleeds.    Eyes: Negative for visual disturbance.   Respiratory: Positive for shortness of breath (with strenuous activity ).    Cardiovascular: Negative for chest pain.   Gastrointestinal: Negative for nausea.   Musculoskeletal: Negative for gait problem.   Skin: Negative for color change.   Neurological: Negative for dizziness.   Hematological: Does not bruise/bleed  easily.   Psychiatric/Behavioral: Negative for sleep disturbance.      Objective:   Physical Exam  Constitutional:       General: She is not in acute distress.     Appearance: She is obese.   HENT:      Head: Normocephalic and atraumatic.   Eyes:      Pupils: Pupils are equal, round, and reactive to light.   Cardiovascular:      Rate and Rhythm: Normal rate.   Pulmonary:      Effort: Pulmonary effort is normal. No respiratory distress.   Abdominal:      General: There is no distension.   Musculoskeletal:         General: Normal range of motion.      Cervical back: Normal range of motion.   Skin:     Coloration: Skin is not pale.   Neurological:      General: No focal deficit present.      Mental Status: She is alert.   Psychiatric:         Mood and Affect: Mood normal.            Diagnotic Results:reviewed  Carotid US 8/9/22  RIGHT SIDE:   1-39% Right ICA stenosis.   Heterogeneous plaque noted in the right internal carotid artery.   Antegrade flow noted in the right vertebral artery.     LEFT SIDE:   Minimal 1-39% Left ICA stenosis.   Heterogeneous plaque noted in the left internal carotid artery.   Antegrade flow noted in the left vertebral artery.      [facility] 4/13/22  · The estimated blood loss was none.  · There was non-obstructive coronary artery disease..  · No significant CAD.     PYP Amyloidosis 3/23/22  · Planar imaging demonstrates cardiac activity that is absent to that of the adjacent rib cage.  · SPECT imaging shows blood pool activity.  · Study is negative for TTR amyloidosis.  · Evaluation for Amyloidosis by serum FLCs, serum and urine immunofixation is recommended in all patients undergoing TC99- PYP scan for cardiac amyloidosis.  · Results should be interpreted in the context of prior evaluation and referral to a hematologist and/or Amyloidosis expert is recommended if either, (a) echo or CMR is strongly suggestive of cardiac amyloidosis and the TC99-PYP scan is not suggestive and/or equivicoal and/or  (b) FLCs are abnormal or equivocal.     TTE 3/15/22  · The left ventricle is normal in size with normal systolic function. The estimated ejection fraction is 60%.  · Mild right ventricular enlargement with normal right ventricular systolic function.  · Grade III left ventricular diastolic dysfunction.  · Severe biatrial enlargement.  · Severe central mitral regurgitation. Using clips 97, 98: ERO = 0.48, RVol = 88  · Severe tricuspid regurgitation. VC = 0.7 with systolic hepatic vein reversal and dense, triangular jet on CW doppler.  · The estimated PA systolic pressure is 58 mmHg.  · Elevated central venous pressure (15 mmHg).        TTE 2/11/22  · The estimated ejection fraction is 60%.  · The left ventricle is normal in size with normal systolic function.  · Grade III left ventricular diastolic dysfunction.  · Normal right ventricular size with normal right ventricular systolic function.  · Severe left atrial enlargement.  · Moderate right atrial enlargement.  · Severe mitral regurgitation.  · Moderate to severe tricuspid regurgitation.  · There is pulmonary hypertension.  · The estimated PA systolic pressure is 50 mmHg.  · Normal central venous pressure (3 mmHg).  · LV 5.5  · TAPSE 1.8     Assessment:   MR/TR  Afib   Plan:   Pre-Op MVr, TVr, and MAZE. Desires mechanical valve if repair not feasible      CTS Attending Note:    I have personally taken the history and examined this patient and agree with the VIDA's note as stated above.  Very pleasant 61-year-old woman with severe mitral regurgitation and moderate to severe tricuspid regurgitation.  She also has atrial fibrillation.  We plan mitral valve repair, tricuspid valve repair, and Maze procedure.  She indicated that should mitral valve repair not be feasible she desires a mechanical prosthesis.  Her questions have been answered and she wishes to proceed

## 2022-08-09 NOTE — H&P (VIEW-ONLY)
Subjective:      Patient ID: Magalys Bass is a 61 y.o. female.     Chief Complaint: No chief complaint on file.        HPI:  Magalys Bass is a 61 y.o. female who presents for pre-op MVr/TVr/MAZE. Medical conditions include chronic diastolic HF (EF 60%), pAF (on Eliquis), HTN, HLD, obesity s/p gastric sleeve (2017). Saw Dr. Matute who has concerns for restrictive disease / amyloid due to echo findings. Patient states that she was diagnosed with a heart murmur ~ 2 years ago. States that she is not really SOB when walking on a flat surface but more so with stairs. States that she used to walk for exercise but has not been doing that, so feels deconditioning also plays a part in her breathing issues. Intermittent swelling in lower extremities. Reports energy level is not what it used to be. Denies any dizziness, orthopnea, chest pain, palpitations. No prior strokes, seizures, blood clots, stents, or sternotomies. Quit smoking 30 years ago. No significant drinking history.      At last visit 2/2/22 with Dr. Huang patient was felt to be a good surgical candidate as both her mitral and tricuspid valves could be correct, as well as her afib. However, there was some concern that the patient may have cardiac amyloidosis and a final decision was pending further workup. The amyloid testing resulted negative. Here today to discuss next steps. Reports there has been no change in her symptoms.      Family and social history reviewed   Current medications Reviewed     Review of Systems   Constitutional: Negative for fatigue.   HENT: Negative for nosebleeds.    Eyes: Negative for visual disturbance.   Respiratory: Positive for shortness of breath (with strenuous activity ).    Cardiovascular: Negative for chest pain.   Gastrointestinal: Negative for nausea.   Musculoskeletal: Negative for gait problem.   Skin: Negative for color change.   Neurological: Negative for dizziness.   Hematological: Does not bruise/bleed  easily.   Psychiatric/Behavioral: Negative for sleep disturbance.      Objective:   Physical Exam  Constitutional:       General: She is not in acute distress.     Appearance: She is obese.   HENT:      Head: Normocephalic and atraumatic.   Eyes:      Pupils: Pupils are equal, round, and reactive to light.   Cardiovascular:      Rate and Rhythm: Normal rate.   Pulmonary:      Effort: Pulmonary effort is normal. No respiratory distress.   Abdominal:      General: There is no distension.   Musculoskeletal:         General: Normal range of motion.      Cervical back: Normal range of motion.   Skin:     Coloration: Skin is not pale.   Neurological:      General: No focal deficit present.      Mental Status: She is alert.   Psychiatric:         Mood and Affect: Mood normal.            Diagnotic Results:reviewed  Carotid US 8/9/22  RIGHT SIDE:   1-39% Right ICA stenosis.   Heterogeneous plaque noted in the right internal carotid artery.   Antegrade flow noted in the right vertebral artery.     LEFT SIDE:   Minimal 1-39% Left ICA stenosis.   Heterogeneous plaque noted in the left internal carotid artery.   Antegrade flow noted in the left vertebral artery.      [facility] 4/13/22  · The estimated blood loss was none.  · There was non-obstructive coronary artery disease..  · No significant CAD.     PYP Amyloidosis 3/23/22  · Planar imaging demonstrates cardiac activity that is absent to that of the adjacent rib cage.  · SPECT imaging shows blood pool activity.  · Study is negative for TTR amyloidosis.  · Evaluation for Amyloidosis by serum FLCs, serum and urine immunofixation is recommended in all patients undergoing TC99- PYP scan for cardiac amyloidosis.  · Results should be interpreted in the context of prior evaluation and referral to a hematologist and/or Amyloidosis expert is recommended if either, (a) echo or CMR is strongly suggestive of cardiac amyloidosis and the TC99-PYP scan is not suggestive and/or equivicoal and/or  (b) FLCs are abnormal or equivocal.     TTE 3/15/22  · The left ventricle is normal in size with normal systolic function. The estimated ejection fraction is 60%.  · Mild right ventricular enlargement with normal right ventricular systolic function.  · Grade III left ventricular diastolic dysfunction.  · Severe biatrial enlargement.  · Severe central mitral regurgitation. Using clips 97, 98: ERO = 0.48, RVol = 88  · Severe tricuspid regurgitation. VC = 0.7 with systolic hepatic vein reversal and dense, triangular jet on CW doppler.  · The estimated PA systolic pressure is 58 mmHg.  · Elevated central venous pressure (15 mmHg).        TTE 2/11/22  · The estimated ejection fraction is 60%.  · The left ventricle is normal in size with normal systolic function.  · Grade III left ventricular diastolic dysfunction.  · Normal right ventricular size with normal right ventricular systolic function.  · Severe left atrial enlargement.  · Moderate right atrial enlargement.  · Severe mitral regurgitation.  · Moderate to severe tricuspid regurgitation.  · There is pulmonary hypertension.  · The estimated PA systolic pressure is 50 mmHg.  · Normal central venous pressure (3 mmHg).  · LV 5.5  · TAPSE 1.8     Assessment:   MR/TR  Afib   Plan:   Pre-Op MVr, TVr, and MAZE. Desires mechanical valve if repair not feasible      CTS Attending Note:    I have personally taken the history and examined this patient and agree with the VIDA's note as stated above.  Very pleasant 61-year-old woman with severe mitral regurgitation and moderate to severe tricuspid regurgitation.  She also has atrial fibrillation.  We plan mitral valve repair, tricuspid valve repair, and Maze procedure.  She indicated that should mitral valve repair not be feasible she desires a mechanical prosthesis.  Her questions have been answered and she wishes to proceed

## 2022-08-10 ENCOUNTER — HOSPITAL ENCOUNTER (INPATIENT)
Facility: HOSPITAL | Age: 62
LOS: 6 days | Discharge: HOME | DRG: 220 | End: 2022-08-16
Attending: THORACIC SURGERY (CARDIOTHORACIC VASCULAR SURGERY) | Admitting: THORACIC SURGERY (CARDIOTHORACIC VASCULAR SURGERY)
Payer: MEDICAID

## 2022-08-10 ENCOUNTER — ANESTHESIA (OUTPATIENT)
Dept: SURGERY | Facility: HOSPITAL | Age: 62
DRG: 220 | End: 2022-08-10
Payer: MEDICAID

## 2022-08-10 DIAGNOSIS — E66.01 MORBID OBESITY: ICD-10-CM

## 2022-08-10 DIAGNOSIS — Z98.890 HISTORY OF HEART SURGERY: ICD-10-CM

## 2022-08-10 DIAGNOSIS — R73.9 STRESS HYPERGLYCEMIA: ICD-10-CM

## 2022-08-10 DIAGNOSIS — Z98.890 S/P TRICUSPID VALVE REPAIR: ICD-10-CM

## 2022-08-10 DIAGNOSIS — I48.0 PAROXYSMAL ATRIAL FIBRILLATION: ICD-10-CM

## 2022-08-10 DIAGNOSIS — I34.0 NONRHEUMATIC MITRAL VALVE REGURGITATION: ICD-10-CM

## 2022-08-10 DIAGNOSIS — Z98.890 S/P MITRAL VALVE REPAIR: ICD-10-CM

## 2022-08-10 DIAGNOSIS — Z98.890 S/P MITRAL VALVE REPAIR: Primary | ICD-10-CM

## 2022-08-10 DIAGNOSIS — I49.9 ARRHYTHMIA: ICD-10-CM

## 2022-08-10 DIAGNOSIS — Z98.890 S/P MVR (MITRAL VALVE REPAIR): Primary | ICD-10-CM

## 2022-08-10 DIAGNOSIS — I36.1 NONRHEUMATIC TRICUSPID VALVE REGURGITATION: ICD-10-CM

## 2022-08-10 LAB
ABO + RH BLD: NORMAL
ALBUMIN SERPL BCP-MCNC: 1.9 G/DL (ref 3.5–5.2)
ALLENS TEST: ABNORMAL
ALP SERPL-CCNC: 108 U/L (ref 55–135)
ALT SERPL W/O P-5'-P-CCNC: 96 U/L (ref 10–44)
ANION GAP SERPL CALC-SCNC: 12 MMOL/L (ref 8–16)
ANISOCYTOSIS BLD QL SMEAR: SLIGHT
APTT BLDCRRT: 40.9 SEC (ref 21–32)
AST SERPL-CCNC: 336 U/L (ref 10–40)
BASOPHILS # BLD AUTO: 0.02 K/UL (ref 0–0.2)
BASOPHILS NFR BLD: 0.1 % (ref 0–1.9)
BILIRUB SERPL-MCNC: 1.1 MG/DL (ref 0.1–1)
BLD GP AB SCN CELLS X3 SERPL QL: NORMAL
BUN SERPL-MCNC: 10 MG/DL (ref 8–23)
CALCIUM SERPL-MCNC: 6.9 MG/DL (ref 8.7–10.5)
CHLORIDE SERPL-SCNC: 112 MMOL/L (ref 95–110)
CO2 SERPL-SCNC: 18 MMOL/L (ref 23–29)
CREAT SERPL-MCNC: 0.9 MG/DL (ref 0.5–1.4)
DELSYS: ABNORMAL
DIFFERENTIAL METHOD: ABNORMAL
DLCO SINGLE BREATH LLN: 16.57
DLCO SINGLE BREATH PRE REF: 57.6 %
DLCO SINGLE BREATH REF: 22.31
DLCOC SBVA LLN: 3.04
DLCOC SBVA REF: 4.52
DLCOC SINGLE BREATH LLN: 16.57
DLCOC SINGLE BREATH REF: 22.31
DLCOCSBVAULN: 5.99
DLCOCSINGLEBREATHULN: 28.04
DLCOSINGLEBREATHULN: 28.04
DLCOVA LLN: 3.04
DLCOVA PRE REF: 75.2 %
DLCOVA PRE: 3.4 ML/(MIN*MMHG*L) (ref 3.04–5.99)
DLCOVA REF: 4.52
DLCOVAULN: 5.99
EOSINOPHIL # BLD AUTO: 0 K/UL (ref 0–0.5)
EOSINOPHIL NFR BLD: 0.1 % (ref 0–8)
ERYTHROCYTE [DISTWIDTH] IN BLOOD BY AUTOMATED COUNT: 14.4 % (ref 11.5–14.5)
ERYTHROCYTE [SEDIMENTATION RATE] IN BLOOD BY WESTERGREN METHOD: 20 MM/H
EST. GFR  (NO RACE VARIABLE): >60 ML/MIN/1.73 M^2
FEF 25 75 LLN: 0.77
FEF 25 75 PRE REF: 140.8 %
FEF 25 75 REF: 1.87
FEV05 LLN: 0.95
FEV05 REF: 1.8
FEV1 FVC LLN: 67
FEV1 FVC PRE REF: 102.8 %
FEV1 FVC REF: 79
FEV1 LLN: 1.48
FEV1 PRE REF: 114 %
FEV1 REF: 2.06
FIO2: 100
FIO2: 100
FIO2: 40
FIO2: 50
FIO2: 50
FIO2: 60
FVC LLN: 1.9
FVC PRE REF: 110.3 %
FVC REF: 2.61
GLUCOSE SERPL-MCNC: 122 MG/DL (ref 70–110)
GLUCOSE SERPL-MCNC: 141 MG/DL (ref 70–110)
GLUCOSE SERPL-MCNC: 155 MG/DL (ref 70–110)
GLUCOSE SERPL-MCNC: 155 MG/DL (ref 70–110)
GLUCOSE SERPL-MCNC: 157 MG/DL (ref 70–110)
GLUCOSE SERPL-MCNC: 158 MG/DL (ref 70–110)
GLUCOSE SERPL-MCNC: 171 MG/DL (ref 70–110)
GLUCOSE SERPL-MCNC: 182 MG/DL (ref 70–110)
GLUCOSE SERPL-MCNC: 186 MG/DL (ref 70–110)
GLUCOSE SERPL-MCNC: 186 MG/DL (ref 70–110)
GLUCOSE SERPL-MCNC: 187 MG/DL (ref 70–110)
GLUCOSE SERPL-MCNC: 187 MG/DL (ref 70–110)
HCO3 UR-SCNC: 17.5 MMOL/L (ref 24–28)
HCO3 UR-SCNC: 18.4 MMOL/L (ref 24–28)
HCO3 UR-SCNC: 19.3 MMOL/L (ref 24–28)
HCO3 UR-SCNC: 23.2 MMOL/L (ref 24–28)
HCO3 UR-SCNC: 27.1 MMOL/L (ref 24–28)
HCO3 UR-SCNC: 28.5 MMOL/L (ref 24–28)
HCO3 UR-SCNC: 29 MMOL/L (ref 24–28)
HCO3 UR-SCNC: 29.3 MMOL/L (ref 24–28)
HCO3 UR-SCNC: 29.6 MMOL/L (ref 24–28)
HCO3 UR-SCNC: 29.7 MMOL/L (ref 24–28)
HCO3 UR-SCNC: 30.5 MMOL/L (ref 24–28)
HCO3 UR-SCNC: 30.6 MMOL/L (ref 24–28)
HCO3 UR-SCNC: 30.6 MMOL/L (ref 24–28)
HCO3 UR-SCNC: 31.3 MMOL/L (ref 24–28)
HCO3 UR-SCNC: 33.4 MMOL/L (ref 24–28)
HCT VFR BLD AUTO: 32 % (ref 37–48.5)
HCT VFR BLD CALC: 27 %PCV (ref 36–54)
HCT VFR BLD CALC: 27 %PCV (ref 36–54)
HCT VFR BLD CALC: 29 %PCV (ref 36–54)
HCT VFR BLD CALC: 29 %PCV (ref 36–54)
HCT VFR BLD CALC: 30 %PCV (ref 36–54)
HCT VFR BLD CALC: 30 %PCV (ref 36–54)
HCT VFR BLD CALC: 31 %PCV (ref 36–54)
HCT VFR BLD CALC: 31 %PCV (ref 36–54)
HCT VFR BLD CALC: 32 %PCV (ref 36–54)
HCT VFR BLD CALC: 33 %PCV (ref 36–54)
HCT VFR BLD CALC: 34 %PCV (ref 36–54)
HCT VFR BLD CALC: 34 %PCV (ref 36–54)
HCT VFR BLD CALC: 36 %PCV (ref 36–54)
HGB BLD-MCNC: 10.5 G/DL (ref 12–16)
HYPOCHROMIA BLD QL SMEAR: ABNORMAL
IMM GRANULOCYTES # BLD AUTO: 0.09 K/UL (ref 0–0.04)
IMM GRANULOCYTES NFR BLD AUTO: 0.5 % (ref 0–0.5)
INR PPP: 1.3 (ref 0.8–1.2)
IVC PRE: 2.53 L (ref 1.9–3.36)
IVC SINGLE BREATH LLN: 1.9
IVC SINGLE BREATH PRE REF: 96.9 %
IVC SINGLE BREATH REF: 2.61
IVCSINGLEBREATHULN: 3.36
LDH SERPL L TO P-CCNC: 0.47 MMOL/L (ref 0.36–1.25)
LDH SERPL L TO P-CCNC: 0.88 MMOL/L (ref 0.36–1.25)
LDH SERPL L TO P-CCNC: 1.32 MMOL/L (ref 0.36–1.25)
LDH SERPL L TO P-CCNC: 2.78 MMOL/L (ref 0.36–1.25)
LDH SERPL L TO P-CCNC: 4.04 MMOL/L (ref 0.36–1.25)
LDH SERPL L TO P-CCNC: 4.53 MMOL/L (ref 0.36–1.25)
LDH SERPL L TO P-CCNC: 4.85 MMOL/L (ref 0.36–1.25)
LDH SERPL L TO P-CCNC: 4.86 MMOL/L (ref 0.36–1.25)
LYMPHOCYTES # BLD AUTO: 1 K/UL (ref 1–4.8)
LYMPHOCYTES NFR BLD: 5.7 % (ref 18–48)
MAGNESIUM SERPL-MCNC: 2.3 MG/DL (ref 1.6–2.6)
MAGNESIUM SERPL-MCNC: 2.3 MG/DL (ref 1.6–2.6)
MCH RBC QN AUTO: 24.9 PG (ref 27–31)
MCHC RBC AUTO-ENTMCNC: 32.8 G/DL (ref 32–36)
MCV RBC AUTO: 76 FL (ref 82–98)
MODE: ABNORMAL
MONOCYTES # BLD AUTO: 1.2 K/UL (ref 0.3–1)
MONOCYTES NFR BLD: 7.1 % (ref 4–15)
MVV LLN: 76
MVV PRE REF: 100.7 %
MVV REF: 90
NEUTROPHILS # BLD AUTO: 15 K/UL (ref 1.8–7.7)
NEUTROPHILS NFR BLD: 86.5 % (ref 38–73)
NRBC BLD-RTO: 0 /100 WBC
OVALOCYTES BLD QL SMEAR: ABNORMAL
PCO2 BLDA: 32.5 MMHG (ref 35–45)
PCO2 BLDA: 34.4 MMHG (ref 35–45)
PCO2 BLDA: 35.5 MMHG (ref 35–45)
PCO2 BLDA: 39.4 MMHG (ref 35–45)
PCO2 BLDA: 42 MMHG (ref 35–45)
PCO2 BLDA: 42.5 MMHG (ref 35–45)
PCO2 BLDA: 45.6 MMHG (ref 35–45)
PCO2 BLDA: 45.7 MMHG (ref 35–45)
PCO2 BLDA: 46.3 MMHG (ref 35–45)
PCO2 BLDA: 48.3 MMHG (ref 35–45)
PCO2 BLDA: 49.7 MMHG (ref 35–45)
PCO2 BLDA: 49.7 MMHG (ref 35–45)
PCO2 BLDA: 51.6 MMHG (ref 35–45)
PCO2 BLDA: 52.4 MMHG (ref 35–45)
PCO2 BLDA: 53.9 MMHG (ref 35–45)
PEEP: 5
PEF LLN: 3.39
PEF PRE REF: 100 %
PEF REF: 5.39
PH SMN: 7.32 [PH] (ref 7.35–7.45)
PH SMN: 7.34 [PH] (ref 7.35–7.45)
PH SMN: 7.35 [PH] (ref 7.35–7.45)
PH SMN: 7.36 [PH] (ref 7.35–7.45)
PH SMN: 7.38 [PH] (ref 7.35–7.45)
PH SMN: 7.4 [PH] (ref 7.35–7.45)
PH SMN: 7.41 [PH] (ref 7.35–7.45)
PH SMN: 7.43 [PH] (ref 7.35–7.45)
PH SMN: 7.45 [PH] (ref 7.35–7.45)
PH SMN: 7.47 [PH] (ref 7.35–7.45)
PH SMN: 7.47 [PH] (ref 7.35–7.45)
PHOSPHATE SERPL-MCNC: 2.9 MG/DL (ref 2.7–4.5)
PHOSPHATE SERPL-MCNC: 2.9 MG/DL (ref 2.7–4.5)
PHYSICIAN COMMENT: ABNORMAL
PLATELET # BLD AUTO: 105 K/UL (ref 150–450)
PLATELET BLD QL SMEAR: ABNORMAL
PMV BLD AUTO: 10.2 FL (ref 9.2–12.9)
PO2 BLDA: 172 MMHG (ref 80–100)
PO2 BLDA: 174 MMHG (ref 80–100)
PO2 BLDA: 201 MMHG (ref 80–100)
PO2 BLDA: 290 MMHG (ref 80–100)
PO2 BLDA: 328 MMHG (ref 80–100)
PO2 BLDA: 359 MMHG (ref 80–100)
PO2 BLDA: 361 MMHG (ref 80–100)
PO2 BLDA: 368 MMHG (ref 80–100)
PO2 BLDA: 376 MMHG (ref 80–100)
PO2 BLDA: 379 MMHG (ref 80–100)
PO2 BLDA: 407 MMHG (ref 80–100)
PO2 BLDA: 462 MMHG (ref 80–100)
PO2 BLDA: 485 MMHG (ref 80–100)
PO2 BLDA: 59 MMHG (ref 40–60)
PO2 BLDA: 635 MMHG (ref 80–100)
POC BE: -2 MMOL/L
POC BE: -6 MMOL/L
POC BE: -8 MMOL/L
POC BE: -8 MMOL/L
POC BE: 2 MMOL/L
POC BE: 3 MMOL/L
POC BE: 3 MMOL/L
POC BE: 4 MMOL/L
POC BE: 5 MMOL/L
POC BE: 5 MMOL/L
POC BE: 6 MMOL/L
POC BE: 6 MMOL/L
POC BE: 7 MMOL/L
POC BE: 8 MMOL/L
POC BE: 9 MMOL/L
POC IONIZED CALCIUM: 0.91 MMOL/L (ref 1.06–1.42)
POC IONIZED CALCIUM: 0.95 MMOL/L (ref 1.06–1.42)
POC IONIZED CALCIUM: 0.96 MMOL/L (ref 1.06–1.42)
POC IONIZED CALCIUM: 0.97 MMOL/L (ref 1.06–1.42)
POC IONIZED CALCIUM: 0.98 MMOL/L (ref 1.06–1.42)
POC IONIZED CALCIUM: 0.98 MMOL/L (ref 1.06–1.42)
POC IONIZED CALCIUM: 0.99 MMOL/L (ref 1.06–1.42)
POC IONIZED CALCIUM: 1 MMOL/L (ref 1.06–1.42)
POC IONIZED CALCIUM: 1.01 MMOL/L (ref 1.06–1.42)
POC IONIZED CALCIUM: 1.02 MMOL/L (ref 1.06–1.42)
POC IONIZED CALCIUM: 1.07 MMOL/L (ref 1.06–1.42)
POC IONIZED CALCIUM: 1.07 MMOL/L (ref 1.06–1.42)
POC IONIZED CALCIUM: 1.09 MMOL/L (ref 1.06–1.42)
POC SATURATED O2: 100 % (ref 95–100)
POC SATURATED O2: 89 % (ref 95–100)
POC SATURATED O2: 99 % (ref 95–100)
POC TCO2: 18 MMOL/L (ref 23–27)
POC TCO2: 19 MMOL/L (ref 23–27)
POC TCO2: 20 MMOL/L (ref 23–27)
POC TCO2: 24 MMOL/L (ref 23–27)
POC TCO2: 28 MMOL/L (ref 23–27)
POC TCO2: 30 MMOL/L (ref 23–27)
POC TCO2: 31 MMOL/L (ref 23–27)
POC TCO2: 31 MMOL/L (ref 24–29)
POC TCO2: 32 MMOL/L (ref 23–27)
POC TCO2: 33 MMOL/L (ref 23–27)
POC TCO2: 35 MMOL/L (ref 23–27)
POCT GLUCOSE: 111 MG/DL (ref 70–110)
POCT GLUCOSE: 122 MG/DL (ref 70–110)
POCT GLUCOSE: 126 MG/DL (ref 70–110)
POCT GLUCOSE: 137 MG/DL (ref 70–110)
POCT GLUCOSE: 162 MG/DL (ref 70–110)
POCT GLUCOSE: 185 MG/DL (ref 70–110)
POCT GLUCOSE: 186 MG/DL (ref 70–110)
POIKILOCYTOSIS BLD QL SMEAR: SLIGHT
POTASSIUM BLD-SCNC: 2.5 MMOL/L (ref 3.5–5.1)
POTASSIUM BLD-SCNC: 2.6 MMOL/L (ref 3.5–5.1)
POTASSIUM BLD-SCNC: 2.7 MMOL/L (ref 3.5–5.1)
POTASSIUM BLD-SCNC: 2.7 MMOL/L (ref 3.5–5.1)
POTASSIUM BLD-SCNC: 2.9 MMOL/L (ref 3.5–5.1)
POTASSIUM BLD-SCNC: 3 MMOL/L (ref 3.5–5.1)
POTASSIUM BLD-SCNC: 3 MMOL/L (ref 3.5–5.1)
POTASSIUM BLD-SCNC: 3.1 MMOL/L (ref 3.5–5.1)
POTASSIUM BLD-SCNC: 3.2 MMOL/L (ref 3.5–5.1)
POTASSIUM BLD-SCNC: 3.2 MMOL/L (ref 3.5–5.1)
POTASSIUM BLD-SCNC: 3.3 MMOL/L (ref 3.5–5.1)
POTASSIUM BLD-SCNC: 3.5 MMOL/L (ref 3.5–5.1)
POTASSIUM BLD-SCNC: 4 MMOL/L (ref 3.5–5.1)
POTASSIUM SERPL-SCNC: 2.8 MMOL/L (ref 3.5–5.1)
POTASSIUM SERPL-SCNC: 3.3 MMOL/L (ref 3.5–5.1)
PRE DLCO: 12.84 ML/(MIN*MMHG) (ref 16.57–28.04)
PRE FEF 25 75: 2.63 L/S (ref 0.77–3.47)
PRE FET 100: 5.42 SEC
PRE FEV05 REF: 108.1 %
PRE FEV1 FVC: 81.56 % (ref 67.23–89.75)
PRE FEV1: 2.35 L (ref 1.48–2.62)
PRE FEV5: 1.95 L (ref 0.95–2.66)
PRE FVC: 2.88 L (ref 1.9–3.36)
PRE MVV: 90.47 L/MIN (ref 76.37–103.32)
PRE PEF: 5.39 L/S (ref 3.39–7.4)
PROT SERPL-MCNC: 4.3 G/DL (ref 6–8.4)
PROTHROMBIN TIME: 13 SEC (ref 9–12.5)
RBC # BLD AUTO: 4.22 M/UL (ref 4–5.4)
SAMPLE: ABNORMAL
SAMPLE: NORMAL
SAMPLE: NORMAL
SITE: ABNORMAL
SODIUM BLD-SCNC: 140 MMOL/L (ref 136–145)
SODIUM BLD-SCNC: 141 MMOL/L (ref 136–145)
SODIUM BLD-SCNC: 141 MMOL/L (ref 136–145)
SODIUM BLD-SCNC: 142 MMOL/L (ref 136–145)
SODIUM BLD-SCNC: 142 MMOL/L (ref 136–145)
SODIUM BLD-SCNC: 143 MMOL/L (ref 136–145)
SODIUM BLD-SCNC: 144 MMOL/L (ref 136–145)
SODIUM BLD-SCNC: 145 MMOL/L (ref 136–145)
SODIUM BLD-SCNC: 145 MMOL/L (ref 136–145)
SODIUM SERPL-SCNC: 142 MMOL/L (ref 136–145)
VA PRE: 3.78 L (ref 4.79–4.79)
VA SINGLE BREATH LLN: 4.79
VA SINGLE BREATH PRE REF: 79 %
VA SINGLE BREATH REF: 4.79
VASINGLEBREATHULN: 4.79
VT: 450
WBC # BLD AUTO: 17.1 K/UL (ref 3.9–12.7)

## 2022-08-10 PROCEDURE — 63600175 PHARM REV CODE 636 W HCPCS: Performed by: STUDENT IN AN ORGANIZED HEALTH CARE EDUCATION/TRAINING PROGRAM

## 2022-08-10 PROCEDURE — 27201037 HC PRESSURE MONITORING SET UP

## 2022-08-10 PROCEDURE — 25000003 PHARM REV CODE 250: Performed by: STUDENT IN AN ORGANIZED HEALTH CARE EDUCATION/TRAINING PROGRAM

## 2022-08-10 PROCEDURE — 80053 COMPREHEN METABOLIC PANEL: CPT | Performed by: STUDENT IN AN ORGANIZED HEALTH CARE EDUCATION/TRAINING PROGRAM

## 2022-08-10 PROCEDURE — 82800 BLOOD PH: CPT

## 2022-08-10 PROCEDURE — 25000003 PHARM REV CODE 250: Performed by: THORACIC SURGERY (CARDIOTHORACIC VASCULAR SURGERY)

## 2022-08-10 PROCEDURE — 63600175 PHARM REV CODE 636 W HCPCS: Mod: JG

## 2022-08-10 PROCEDURE — 88304 PR  SURG PATH,LEVEL III: ICD-10-PCS | Mod: 26,,, | Performed by: PATHOLOGY

## 2022-08-10 PROCEDURE — 84132 ASSAY OF SERUM POTASSIUM: CPT | Performed by: PHYSICIAN ASSISTANT

## 2022-08-10 PROCEDURE — 94761 N-INVAS EAR/PLS OXIMETRY MLT: CPT

## 2022-08-10 PROCEDURE — 84100 ASSAY OF PHOSPHORUS: CPT | Performed by: PHYSICIAN ASSISTANT

## 2022-08-10 PROCEDURE — 93312 TEE: ICD-10-PCS | Mod: 26,59,, | Performed by: ANESTHESIOLOGY

## 2022-08-10 PROCEDURE — 63600175 PHARM REV CODE 636 W HCPCS: Performed by: PHYSICIAN ASSISTANT

## 2022-08-10 PROCEDURE — C2618 PROBE/NEEDLE, CRYO: HCPCS | Performed by: THORACIC SURGERY (CARDIOTHORACIC VASCULAR SURGERY)

## 2022-08-10 PROCEDURE — 85610 PROTHROMBIN TIME: CPT | Performed by: PHYSICIAN ASSISTANT

## 2022-08-10 PROCEDURE — 83605 ASSAY OF LACTIC ACID: CPT

## 2022-08-10 PROCEDURE — 36000713 HC OR TIME LEV V EA ADD 15 MIN: Performed by: THORACIC SURGERY (CARDIOTHORACIC VASCULAR SURGERY)

## 2022-08-10 PROCEDURE — 93010 ELECTROCARDIOGRAM REPORT: CPT | Mod: ,,, | Performed by: INTERNAL MEDICINE

## 2022-08-10 PROCEDURE — 64999 PR BLOCK, ERECTOR SPINAE PLANE, SINGLE SHOT: ICD-10-PCS | Mod: ,,, | Performed by: ANESTHESIOLOGY

## 2022-08-10 PROCEDURE — 33426 REPAIR OF MITRAL VALVE: CPT | Mod: 51,,, | Performed by: THORACIC SURGERY (CARDIOTHORACIC VASCULAR SURGERY)

## 2022-08-10 PROCEDURE — 85014 HEMATOCRIT: CPT

## 2022-08-10 PROCEDURE — 93312 ECHO TRANSESOPHAGEAL: CPT | Mod: 26,59,, | Performed by: ANESTHESIOLOGY

## 2022-08-10 PROCEDURE — 36556 INSERT NON-TUNNEL CV CATH: CPT | Mod: 59,,, | Performed by: ANESTHESIOLOGY

## 2022-08-10 PROCEDURE — D9220A PRA ANESTHESIA: ICD-10-PCS | Mod: ,,, | Performed by: ANESTHESIOLOGY

## 2022-08-10 PROCEDURE — 93325 DOPPLER ECHO COLOR FLOW MAPG: CPT | Mod: 26,,, | Performed by: ANESTHESIOLOGY

## 2022-08-10 PROCEDURE — D9220A PRA ANESTHESIA: Mod: ,,, | Performed by: ANESTHESIOLOGY

## 2022-08-10 PROCEDURE — P9045 ALBUMIN (HUMAN), 5%, 250 ML: HCPCS | Mod: JG

## 2022-08-10 PROCEDURE — 94640 AIRWAY INHALATION TREATMENT: CPT

## 2022-08-10 PROCEDURE — 93325 PR DOPPLER COLOR FLOW VELOCITY MAP: ICD-10-PCS | Mod: 26,,, | Performed by: ANESTHESIOLOGY

## 2022-08-10 PROCEDURE — 27200953 HC CARDIOPLEGIA SYSTEM

## 2022-08-10 PROCEDURE — 27000191 HC C-V MONITORING

## 2022-08-10 PROCEDURE — 88304 TISSUE EXAM BY PATHOLOGIST: CPT | Performed by: PATHOLOGY

## 2022-08-10 PROCEDURE — 93005 ELECTROCARDIOGRAM TRACING: CPT

## 2022-08-10 PROCEDURE — 37799 UNLISTED PX VASCULAR SURGERY: CPT

## 2022-08-10 PROCEDURE — 20000000 HC ICU ROOM

## 2022-08-10 PROCEDURE — 25000003 PHARM REV CODE 250: Performed by: ANESTHESIOLOGY

## 2022-08-10 PROCEDURE — 82330 ASSAY OF CALCIUM: CPT

## 2022-08-10 PROCEDURE — 85025 COMPLETE CBC W/AUTO DIFF WBC: CPT | Performed by: PHYSICIAN ASSISTANT

## 2022-08-10 PROCEDURE — 27100026 HC SHUNT SENSOR, TERUMO

## 2022-08-10 PROCEDURE — 36620 INSERTION CATHETER ARTERY: CPT | Mod: 59,,, | Performed by: ANESTHESIOLOGY

## 2022-08-10 PROCEDURE — 33259 ABLATE ATRIA W/BYPASS ADD-ON: CPT | Mod: ,,, | Performed by: THORACIC SURGERY (CARDIOTHORACIC VASCULAR SURGERY)

## 2022-08-10 PROCEDURE — 36592 COLLECT BLOOD FROM PICC: CPT

## 2022-08-10 PROCEDURE — 33259 PR ABLATE/ RECONSTUCT ATRIA, W OTHER PROCED EXTENS W/ BYPASS: ICD-10-PCS | Mod: ,,, | Performed by: THORACIC SURGERY (CARDIOTHORACIC VASCULAR SURGERY)

## 2022-08-10 PROCEDURE — 99900035 HC TECH TIME PER 15 MIN (STAT)

## 2022-08-10 PROCEDURE — 25000003 PHARM REV CODE 250: Performed by: PHYSICIAN ASSISTANT

## 2022-08-10 PROCEDURE — 85520 HEPARIN ASSAY: CPT

## 2022-08-10 PROCEDURE — 84132 ASSAY OF SERUM POTASSIUM: CPT

## 2022-08-10 PROCEDURE — 25000242 PHARM REV CODE 250 ALT 637 W/ HCPCS: Performed by: PHYSICIAN ASSISTANT

## 2022-08-10 PROCEDURE — 37000009 HC ANESTHESIA EA ADD 15 MINS: Performed by: THORACIC SURGERY (CARDIOTHORACIC VASCULAR SURGERY)

## 2022-08-10 PROCEDURE — 93320 DOPPLER ECHO COMPLETE: CPT | Mod: 26,,, | Performed by: ANESTHESIOLOGY

## 2022-08-10 PROCEDURE — 64999 UNLISTED PX NERVOUS SYSTEM: CPT | Mod: ,,, | Performed by: ANESTHESIOLOGY

## 2022-08-10 PROCEDURE — 36000712 HC OR TIME LEV V 1ST 15 MIN: Performed by: THORACIC SURGERY (CARDIOTHORACIC VASCULAR SURGERY)

## 2022-08-10 PROCEDURE — 33464 PR VALVULOPLAS TRICUS W RING INSERT: ICD-10-PCS | Mod: ,,, | Performed by: THORACIC SURGERY (CARDIOTHORACIC VASCULAR SURGERY)

## 2022-08-10 PROCEDURE — 63600175 PHARM REV CODE 636 W HCPCS

## 2022-08-10 PROCEDURE — 99499 NO LOS: ICD-10-PCS | Mod: ,,, | Performed by: ANESTHESIOLOGY

## 2022-08-10 PROCEDURE — 86901 BLOOD TYPING SEROLOGIC RH(D): CPT | Performed by: PHYSICIAN ASSISTANT

## 2022-08-10 PROCEDURE — 93010 EKG 12-LEAD: ICD-10-PCS | Mod: ,,, | Performed by: INTERNAL MEDICINE

## 2022-08-10 PROCEDURE — 94002 VENT MGMT INPAT INIT DAY: CPT

## 2022-08-10 PROCEDURE — 27000175 HC ADULT BYPASS PUMP

## 2022-08-10 PROCEDURE — 36620 PR INSERT CATH,ART,PERCUT,SHORTTERM: ICD-10-PCS | Mod: 59,,, | Performed by: ANESTHESIOLOGY

## 2022-08-10 PROCEDURE — 99499 UNLISTED E&M SERVICE: CPT | Mod: ,,, | Performed by: ANESTHESIOLOGY

## 2022-08-10 PROCEDURE — 99223 PR INITIAL HOSPITAL CARE,LEVL III: ICD-10-PCS | Mod: ,,, | Performed by: NURSE PRACTITIONER

## 2022-08-10 PROCEDURE — 99223 1ST HOSP IP/OBS HIGH 75: CPT | Mod: ,,, | Performed by: NURSE PRACTITIONER

## 2022-08-10 PROCEDURE — 27201423 OPTIME MED/SURG SUP & DEVICES STERILE SUPPLY: Performed by: THORACIC SURGERY (CARDIOTHORACIC VASCULAR SURGERY)

## 2022-08-10 PROCEDURE — 93320 PR DOPPLER ECHO HEART,COMPLETE: ICD-10-PCS | Mod: 26,,, | Performed by: ANESTHESIOLOGY

## 2022-08-10 PROCEDURE — 27100088 HC CELL SAVER

## 2022-08-10 PROCEDURE — 82803 BLOOD GASES ANY COMBINATION: CPT

## 2022-08-10 PROCEDURE — 27800595 HC HEART VALVES

## 2022-08-10 PROCEDURE — 25000003 PHARM REV CODE 250

## 2022-08-10 PROCEDURE — 33426 PR MITRALPLASTY W PROSTHETIC RING: ICD-10-PCS | Mod: 51,,, | Performed by: THORACIC SURGERY (CARDIOTHORACIC VASCULAR SURGERY)

## 2022-08-10 PROCEDURE — C1729 CATH, DRAINAGE: HCPCS | Performed by: THORACIC SURGERY (CARDIOTHORACIC VASCULAR SURGERY)

## 2022-08-10 PROCEDURE — P9045 ALBUMIN (HUMAN), 5%, 250 ML: HCPCS | Mod: JG | Performed by: PHYSICIAN ASSISTANT

## 2022-08-10 PROCEDURE — 63600175 PHARM REV CODE 636 W HCPCS: Performed by: ANESTHESIOLOGY

## 2022-08-10 PROCEDURE — 33464 VALVULOPLASTY TRICUSPID: CPT | Mod: ,,, | Performed by: THORACIC SURGERY (CARDIOTHORACIC VASCULAR SURGERY)

## 2022-08-10 PROCEDURE — 83735 ASSAY OF MAGNESIUM: CPT | Performed by: PHYSICIAN ASSISTANT

## 2022-08-10 PROCEDURE — 37000008 HC ANESTHESIA 1ST 15 MINUTES: Performed by: THORACIC SURGERY (CARDIOTHORACIC VASCULAR SURGERY)

## 2022-08-10 PROCEDURE — 27000221 HC OXYGEN, UP TO 24 HOURS

## 2022-08-10 PROCEDURE — 88304 TISSUE EXAM BY PATHOLOGIST: CPT | Mod: 26,,, | Performed by: PATHOLOGY

## 2022-08-10 PROCEDURE — 36556 CENTRAL LINE: ICD-10-PCS | Mod: 59,,, | Performed by: ANESTHESIOLOGY

## 2022-08-10 PROCEDURE — 85730 THROMBOPLASTIN TIME PARTIAL: CPT | Performed by: PHYSICIAN ASSISTANT

## 2022-08-10 PROCEDURE — 27202608 HC CANNULA, MISC

## 2022-08-10 PROCEDURE — 84295 ASSAY OF SERUM SODIUM: CPT

## 2022-08-10 DEVICE — IMPLANTABLE DEVICE: Type: IMPLANTABLE DEVICE | Site: HEART | Status: FUNCTIONAL

## 2022-08-10 DEVICE — RING ANNULOPLASTY TRICUSPID: Type: IMPLANTABLE DEVICE | Site: HEART | Status: FUNCTIONAL

## 2022-08-10 RX ORDER — POTASSIUM CHLORIDE 29.8 MG/ML
40 INJECTION INTRAVENOUS
Status: DISCONTINUED | OUTPATIENT
Start: 2022-08-10 | End: 2022-08-12

## 2022-08-10 RX ORDER — POTASSIUM CHLORIDE 14.9 MG/ML
60 INJECTION INTRAVENOUS
Status: DISCONTINUED | OUTPATIENT
Start: 2022-08-10 | End: 2022-08-12

## 2022-08-10 RX ORDER — CALCIUM GLUCONATE 20 MG/ML
2 INJECTION, SOLUTION INTRAVENOUS
Status: DISCONTINUED | OUTPATIENT
Start: 2022-08-10 | End: 2022-08-16

## 2022-08-10 RX ORDER — FAMOTIDINE 10 MG/ML
20 INJECTION INTRAVENOUS 2 TIMES DAILY
Status: DISCONTINUED | OUTPATIENT
Start: 2022-08-10 | End: 2022-08-12

## 2022-08-10 RX ORDER — MAGNESIUM SULFATE HEPTAHYDRATE 40 MG/ML
4 INJECTION, SOLUTION INTRAVENOUS
Status: DISCONTINUED | OUTPATIENT
Start: 2022-08-10 | End: 2022-08-16

## 2022-08-10 RX ORDER — ACETAMINOPHEN 650 MG/20.3ML
1000 LIQUID ORAL EVERY 8 HOURS
Status: DISCONTINUED | OUTPATIENT
Start: 2022-08-10 | End: 2022-08-11

## 2022-08-10 RX ORDER — ONDANSETRON 2 MG/ML
4 INJECTION INTRAMUSCULAR; INTRAVENOUS EVERY 12 HOURS PRN
Status: DISCONTINUED | OUTPATIENT
Start: 2022-08-10 | End: 2022-08-16 | Stop reason: HOSPADM

## 2022-08-10 RX ORDER — HYDROMORPHONE HYDROCHLORIDE 1 MG/ML
1 INJECTION, SOLUTION INTRAMUSCULAR; INTRAVENOUS; SUBCUTANEOUS
Status: DISCONTINUED | OUTPATIENT
Start: 2022-08-10 | End: 2022-08-12

## 2022-08-10 RX ORDER — DEXTROSE MONOHYDRATE, SODIUM CHLORIDE, AND POTASSIUM CHLORIDE 50; 1.49; 4.5 G/1000ML; G/1000ML; G/1000ML
INJECTION, SOLUTION INTRAVENOUS CONTINUOUS
Status: DISCONTINUED | OUTPATIENT
Start: 2022-08-10 | End: 2022-08-14

## 2022-08-10 RX ORDER — HEPARIN SODIUM 1000 [USP'U]/ML
INJECTION, SOLUTION INTRAVENOUS; SUBCUTANEOUS
Status: DISCONTINUED | OUTPATIENT
Start: 2022-08-10 | End: 2022-08-10

## 2022-08-10 RX ORDER — ALBUMIN HUMAN 50 G/1000ML
25 SOLUTION INTRAVENOUS ONCE AS NEEDED
Status: COMPLETED | OUTPATIENT
Start: 2022-08-10 | End: 2022-08-10

## 2022-08-10 RX ORDER — MUPIROCIN 20 MG/G
1 OINTMENT TOPICAL 2 TIMES DAILY
Status: DISPENSED | OUTPATIENT
Start: 2022-08-10 | End: 2022-08-15

## 2022-08-10 RX ORDER — ASPIRIN 325 MG
325 TABLET ORAL DAILY
Status: DISCONTINUED | OUTPATIENT
Start: 2022-08-10 | End: 2022-08-16 | Stop reason: HOSPADM

## 2022-08-10 RX ORDER — POTASSIUM CHLORIDE 29.8 MG/ML
80 INJECTION INTRAVENOUS
Status: DISCONTINUED | OUTPATIENT
Start: 2022-08-10 | End: 2022-08-12

## 2022-08-10 RX ORDER — FENTANYL CITRATE 50 UG/ML
25 INJECTION, SOLUTION INTRAMUSCULAR; INTRAVENOUS
Status: DISCONTINUED | OUTPATIENT
Start: 2022-08-10 | End: 2022-08-10

## 2022-08-10 RX ORDER — ASPIRIN 300 MG/1
300 SUPPOSITORY RECTAL ONCE AS NEEDED
Status: DISCONTINUED | OUTPATIENT
Start: 2022-08-10 | End: 2022-08-16 | Stop reason: HOSPADM

## 2022-08-10 RX ORDER — ROCURONIUM BROMIDE 10 MG/ML
INJECTION, SOLUTION INTRAVENOUS
Status: DISCONTINUED | OUTPATIENT
Start: 2022-08-10 | End: 2022-08-10

## 2022-08-10 RX ORDER — BUPIVACAINE HYDROCHLORIDE 5 MG/ML
INJECTION, SOLUTION EPIDURAL; INTRACAUDAL
Status: COMPLETED | OUTPATIENT
Start: 2022-08-10 | End: 2022-08-10

## 2022-08-10 RX ORDER — NOREPINEPHRINE BITARTRATE/D5W 4MG/250ML
0-3 PLASTIC BAG, INJECTION (ML) INTRAVENOUS CONTINUOUS
Status: DISCONTINUED | OUTPATIENT
Start: 2022-08-10 | End: 2022-08-10

## 2022-08-10 RX ORDER — ASPIRIN 325 MG
325 TABLET ORAL DAILY
Status: DISCONTINUED | OUTPATIENT
Start: 2022-08-10 | End: 2022-08-10

## 2022-08-10 RX ORDER — BISACODYL 10 MG
10 SUPPOSITORY, RECTAL RECTAL DAILY PRN
Status: DISCONTINUED | OUTPATIENT
Start: 2022-08-10 | End: 2022-08-16 | Stop reason: HOSPADM

## 2022-08-10 RX ORDER — ONDANSETRON 2 MG/ML
INJECTION INTRAMUSCULAR; INTRAVENOUS
Status: DISCONTINUED | OUTPATIENT
Start: 2022-08-10 | End: 2022-08-10

## 2022-08-10 RX ORDER — FENTANYL CITRATE 50 UG/ML
INJECTION, SOLUTION INTRAMUSCULAR; INTRAVENOUS
Status: DISCONTINUED | OUTPATIENT
Start: 2022-08-10 | End: 2022-08-10

## 2022-08-10 RX ORDER — EPINEPHRINE 1 MG/ML
INJECTION, SOLUTION INTRACARDIAC; INTRAMUSCULAR; INTRAVENOUS; SUBCUTANEOUS
Status: DISCONTINUED | OUTPATIENT
Start: 2022-08-10 | End: 2022-08-10

## 2022-08-10 RX ORDER — DEXAMETHASONE SODIUM PHOSPHATE 4 MG/ML
INJECTION, SOLUTION INTRA-ARTICULAR; INTRALESIONAL; INTRAMUSCULAR; INTRAVENOUS; SOFT TISSUE
Status: DISCONTINUED | OUTPATIENT
Start: 2022-08-10 | End: 2022-08-10

## 2022-08-10 RX ORDER — PROTAMINE SULFATE 10 MG/ML
INJECTION, SOLUTION INTRAVENOUS
Status: DISCONTINUED | OUTPATIENT
Start: 2022-08-10 | End: 2022-08-10

## 2022-08-10 RX ORDER — TRANEXAMIC ACID 100 MG/ML
INJECTION, SOLUTION INTRAVENOUS CONTINUOUS PRN
Status: DISCONTINUED | OUTPATIENT
Start: 2022-08-10 | End: 2022-08-10

## 2022-08-10 RX ORDER — SODIUM CHLORIDE 0.9 % (FLUSH) 0.9 %
10 SYRINGE (ML) INJECTION
Status: DISCONTINUED | OUTPATIENT
Start: 2022-08-10 | End: 2022-08-16 | Stop reason: HOSPADM

## 2022-08-10 RX ORDER — METOPROLOL TARTRATE 25 MG/1
25 TABLET, FILM COATED ORAL
Status: COMPLETED | OUTPATIENT
Start: 2022-08-10 | End: 2022-08-10

## 2022-08-10 RX ORDER — TRANEXAMIC ACID 100 MG/ML
INJECTION, SOLUTION INTRAVENOUS
Status: DISCONTINUED | OUTPATIENT
Start: 2022-08-10 | End: 2022-08-10

## 2022-08-10 RX ORDER — POTASSIUM CHLORIDE 14.9 MG/ML
INJECTION INTRAVENOUS CONTINUOUS PRN
Status: DISCONTINUED | OUTPATIENT
Start: 2022-08-10 | End: 2022-08-10

## 2022-08-10 RX ORDER — MIDAZOLAM HYDROCHLORIDE 1 MG/ML
INJECTION, SOLUTION INTRAMUSCULAR; INTRAVENOUS
Status: DISCONTINUED | OUTPATIENT
Start: 2022-08-10 | End: 2022-08-10

## 2022-08-10 RX ORDER — CEFAZOLIN SODIUM/D5W 2 G/100 ML
2 PLASTIC BAG, INJECTION (ML) INTRAVENOUS
Status: COMPLETED | OUTPATIENT
Start: 2022-08-10 | End: 2022-08-12

## 2022-08-10 RX ORDER — MAGNESIUM SULFATE HEPTAHYDRATE 40 MG/ML
2 INJECTION, SOLUTION INTRAVENOUS
Status: DISCONTINUED | OUTPATIENT
Start: 2022-08-10 | End: 2022-08-12

## 2022-08-10 RX ORDER — DOCUSATE SODIUM 100 MG/1
100 CAPSULE, LIQUID FILLED ORAL 2 TIMES DAILY
Status: DISCONTINUED | OUTPATIENT
Start: 2022-08-10 | End: 2022-08-16 | Stop reason: HOSPADM

## 2022-08-10 RX ORDER — ACETAMINOPHEN 325 MG/1
650 TABLET ORAL EVERY 4 HOURS PRN
Status: DISCONTINUED | OUTPATIENT
Start: 2022-08-10 | End: 2022-08-10

## 2022-08-10 RX ORDER — LIDOCAINE HYDROCHLORIDE 10 MG/ML
1 INJECTION, SOLUTION EPIDURAL; INFILTRATION; INTRACAUDAL; PERINEURAL
Status: COMPLETED | OUTPATIENT
Start: 2022-08-10 | End: 2022-08-10

## 2022-08-10 RX ORDER — NITROGLYCERIN 5 MG/ML
INJECTION, SOLUTION INTRAVENOUS
Status: DISCONTINUED | OUTPATIENT
Start: 2022-08-10 | End: 2022-08-10

## 2022-08-10 RX ORDER — CALCIUM GLUCONATE 20 MG/ML
3 INJECTION, SOLUTION INTRAVENOUS
Status: DISCONTINUED | OUTPATIENT
Start: 2022-08-10 | End: 2022-08-16

## 2022-08-10 RX ORDER — NEOSTIGMINE METHYLSULFATE 0.5 MG/ML
INJECTION, SOLUTION INTRAVENOUS
Status: DISCONTINUED | OUTPATIENT
Start: 2022-08-10 | End: 2022-08-10

## 2022-08-10 RX ORDER — MAGNESIUM SULFATE HEPTAHYDRATE 40 MG/ML
2 INJECTION, SOLUTION INTRAVENOUS
Status: DISCONTINUED | OUTPATIENT
Start: 2022-08-10 | End: 2022-08-16

## 2022-08-10 RX ORDER — CEFAZOLIN SODIUM/WATER 2 G/20 ML
SYRINGE (ML) INTRAVENOUS
Status: DISCONTINUED | OUTPATIENT
Start: 2022-08-10 | End: 2022-08-10

## 2022-08-10 RX ORDER — NOREPINEPHRINE BITARTRATE 1 MG/ML
INJECTION, SOLUTION INTRAVENOUS
Status: DISCONTINUED | OUTPATIENT
Start: 2022-08-10 | End: 2022-08-10

## 2022-08-10 RX ORDER — POLYETHYLENE GLYCOL 3350 17 G/17G
17 POWDER, FOR SOLUTION ORAL DAILY
Status: DISCONTINUED | OUTPATIENT
Start: 2022-08-10 | End: 2022-08-16 | Stop reason: HOSPADM

## 2022-08-10 RX ORDER — ASPIRIN 325 MG
325 TABLET ORAL ONCE
Status: DISCONTINUED | OUTPATIENT
Start: 2022-08-10 | End: 2022-08-10

## 2022-08-10 RX ORDER — IPRATROPIUM BROMIDE AND ALBUTEROL SULFATE 2.5; .5 MG/3ML; MG/3ML
3 SOLUTION RESPIRATORY (INHALATION) EVERY 4 HOURS
Status: COMPLETED | OUTPATIENT
Start: 2022-08-10 | End: 2022-08-11

## 2022-08-10 RX ORDER — PROPOFOL 10 MG/ML
0-50 INJECTION, EMULSION INTRAVENOUS CONTINUOUS
Status: DISCONTINUED | OUTPATIENT
Start: 2022-08-10 | End: 2022-08-11

## 2022-08-10 RX ORDER — MUPIROCIN 20 MG/G
1 OINTMENT TOPICAL
Status: DISCONTINUED | OUTPATIENT
Start: 2022-08-10 | End: 2022-08-10

## 2022-08-10 RX ORDER — DEXMEDETOMIDINE HYDROCHLORIDE 4 UG/ML
0-1.4 INJECTION, SOLUTION INTRAVENOUS CONTINUOUS
Status: DISCONTINUED | OUTPATIENT
Start: 2022-08-10 | End: 2022-08-11

## 2022-08-10 RX ORDER — PROPOFOL 10 MG/ML
VIAL (ML) INTRAVENOUS
Status: DISCONTINUED | OUTPATIENT
Start: 2022-08-10 | End: 2022-08-10

## 2022-08-10 RX ORDER — IPRATROPIUM BROMIDE AND ALBUTEROL SULFATE 2.5; .5 MG/3ML; MG/3ML
3 SOLUTION RESPIRATORY (INHALATION) EVERY 4 HOURS PRN
Status: ACTIVE | OUTPATIENT
Start: 2022-08-10 | End: 2022-08-11

## 2022-08-10 RX ORDER — CALCIUM GLUCONATE 20 MG/ML
1 INJECTION, SOLUTION INTRAVENOUS
Status: DISCONTINUED | OUTPATIENT
Start: 2022-08-10 | End: 2022-08-16

## 2022-08-10 RX ORDER — MAGNESIUM SULFATE HEPTAHYDRATE 40 MG/ML
4 INJECTION, SOLUTION INTRAVENOUS
Status: DISCONTINUED | OUTPATIENT
Start: 2022-08-10 | End: 2022-08-12

## 2022-08-10 RX ORDER — BUPIVACAINE HYDROCHLORIDE 7.5 MG/ML
INJECTION, SOLUTION EPIDURAL; RETROBULBAR
Status: DISPENSED
Start: 2022-08-10 | End: 2022-08-10

## 2022-08-10 RX ORDER — FENTANYL CITRATE 50 UG/ML
50 INJECTION, SOLUTION INTRAMUSCULAR; INTRAVENOUS
Status: DISCONTINUED | OUTPATIENT
Start: 2022-08-13 | End: 2022-08-10

## 2022-08-10 RX ORDER — METOCLOPRAMIDE HYDROCHLORIDE 5 MG/ML
5 INJECTION INTRAMUSCULAR; INTRAVENOUS EVERY 6 HOURS PRN
Status: DISCONTINUED | OUTPATIENT
Start: 2022-08-10 | End: 2022-08-10

## 2022-08-10 RX ORDER — CEFAZOLIN SODIUM 2 G/50ML
2 SOLUTION INTRAVENOUS
Status: DISCONTINUED | OUTPATIENT
Start: 2022-08-10 | End: 2022-08-10

## 2022-08-10 RX ORDER — POTASSIUM CHLORIDE 14.9 MG/ML
20 INJECTION INTRAVENOUS
Status: DISCONTINUED | OUTPATIENT
Start: 2022-08-10 | End: 2022-08-12

## 2022-08-10 RX ORDER — ALBUMIN HUMAN 50 G/1000ML
12.5 SOLUTION INTRAVENOUS ONCE
Status: COMPLETED | OUTPATIENT
Start: 2022-08-10 | End: 2022-08-10

## 2022-08-10 RX ORDER — PROPOFOL 10 MG/ML
VIAL (ML) INTRAVENOUS CONTINUOUS PRN
Status: DISCONTINUED | OUTPATIENT
Start: 2022-08-10 | End: 2022-08-10

## 2022-08-10 RX ORDER — KETAMINE HCL IN 0.9 % NACL 50 MG/5 ML
SYRINGE (ML) INTRAVENOUS
Status: DISCONTINUED | OUTPATIENT
Start: 2022-08-10 | End: 2022-08-10

## 2022-08-10 RX ORDER — ALBUMIN HUMAN 50 G/1000ML
25 SOLUTION INTRAVENOUS ONCE
Status: DISCONTINUED | OUTPATIENT
Start: 2022-08-10 | End: 2022-08-11

## 2022-08-10 RX ORDER — SODIUM CHLORIDE 9 MG/ML
INJECTION, SOLUTION INTRAVENOUS CONTINUOUS
Status: DISCONTINUED | OUTPATIENT
Start: 2022-08-10 | End: 2022-08-14

## 2022-08-10 RX ADMIN — PROPOFOL 25 MCG/KG/MIN: 10 INJECTION, EMULSION INTRAVENOUS at 05:08

## 2022-08-10 RX ADMIN — DEXAMETHASONE SODIUM PHOSPHATE 4 MG: 4 INJECTION INTRA-ARTICULAR; INTRALESIONAL; INTRAMUSCULAR; INTRAVENOUS; SOFT TISSUE at 09:08

## 2022-08-10 RX ADMIN — FAMOTIDINE 20 MG: 10 INJECTION, SOLUTION INTRAVENOUS at 08:08

## 2022-08-10 RX ADMIN — NOREPINEPHRINE BITARTRATE 16 MCG: 1 INJECTION, SOLUTION, CONCENTRATE INTRAVENOUS at 02:08

## 2022-08-10 RX ADMIN — NITROGLYCERIN 50 MCG: 5 INJECTION, SOLUTION INTRAVENOUS at 02:08

## 2022-08-10 RX ADMIN — ALBUMIN (HUMAN) 12.5 G: 12.5 SOLUTION INTRAVENOUS at 07:08

## 2022-08-10 RX ADMIN — EPINEPHRINE 10 MCG: 1 INJECTION, SOLUTION, CONCENTRATE INTRAVENOUS at 09:08

## 2022-08-10 RX ADMIN — PROTAMINE SULFATE 245 MG: 10 INJECTION, SOLUTION INTRAVENOUS at 02:08

## 2022-08-10 RX ADMIN — EPINEPHRINE 0.02 MCG/KG/MIN: 1 INJECTION INTRAMUSCULAR; INTRAVENOUS; SUBCUTANEOUS at 09:08

## 2022-08-10 RX ADMIN — MIDAZOLAM 1 MG: 1 INJECTION INTRAMUSCULAR; INTRAVENOUS at 08:08

## 2022-08-10 RX ADMIN — ALBUMIN (HUMAN) 25 G: 12.5 SOLUTION INTRAVENOUS at 04:08

## 2022-08-10 RX ADMIN — Medication 30 MG: at 08:08

## 2022-08-10 RX ADMIN — ROCURONIUM BROMIDE 60 MG: 10 INJECTION INTRAVENOUS at 08:08

## 2022-08-10 RX ADMIN — Medication 3 G: at 09:08

## 2022-08-10 RX ADMIN — ROCURONIUM BROMIDE 40 MG: 10 INJECTION INTRAVENOUS at 12:08

## 2022-08-10 RX ADMIN — IPRATROPIUM BROMIDE AND ALBUTEROL SULFATE 3 ML: 2.5; .5 SOLUTION RESPIRATORY (INHALATION) at 11:08

## 2022-08-10 RX ADMIN — IPRATROPIUM BROMIDE AND ALBUTEROL SULFATE 3 ML: 2.5; .5 SOLUTION RESPIRATORY (INHALATION) at 07:08

## 2022-08-10 RX ADMIN — BUPIVACAINE HYDROCHLORIDE 30 ML: 5 INJECTION, SOLUTION EPIDURAL; INTRACAUDAL; PERINEURAL at 08:08

## 2022-08-10 RX ADMIN — POTASSIUM CHLORIDE 20 MEQ: 14.9 INJECTION, SOLUTION INTRAVENOUS at 04:08

## 2022-08-10 RX ADMIN — GLYCOPYRROLATE 0.6 MG: 0.2 INJECTION INTRAMUSCULAR; INTRAVENOUS at 04:08

## 2022-08-10 RX ADMIN — DEXTROSE 2 G: 50 INJECTION, SOLUTION INTRAVENOUS at 09:08

## 2022-08-10 RX ADMIN — NOREPINEPHRINE BITARTRATE 32 MCG: 1 INJECTION, SOLUTION, CONCENTRATE INTRAVENOUS at 02:08

## 2022-08-10 RX ADMIN — ROCURONIUM BROMIDE 30 MG: 10 INJECTION INTRAVENOUS at 09:08

## 2022-08-10 RX ADMIN — METOPROLOL TARTRATE 25 MG: 25 TABLET, FILM COATED ORAL at 06:08

## 2022-08-10 RX ADMIN — IPRATROPIUM BROMIDE AND ALBUTEROL SULFATE 3 ML: 2.5; .5 SOLUTION RESPIRATORY (INHALATION) at 04:08

## 2022-08-10 RX ADMIN — NEOSTIGMINE METHYLSULFATE 5 MG: 0.5 INJECTION, SOLUTION INTRAVENOUS at 04:08

## 2022-08-10 RX ADMIN — CALCIUM GLUCONATE 1 G: 20 INJECTION, SOLUTION INTRAVENOUS at 10:08

## 2022-08-10 RX ADMIN — FENTANYL CITRATE 100 MCG: 50 INJECTION INTRAMUSCULAR; INTRAVENOUS at 03:08

## 2022-08-10 RX ADMIN — POTASSIUM CHLORIDE: 200 INJECTION, SOLUTION INTRAVENOUS at 09:08

## 2022-08-10 RX ADMIN — Medication 2 G: at 02:08

## 2022-08-10 RX ADMIN — METHADONE HYDROCHLORIDE 26 MG: 10 INJECTION, SOLUTION INTRAMUSCULAR; INTRAVENOUS; SUBCUTANEOUS at 09:08

## 2022-08-10 RX ADMIN — POTASSIUM CHLORIDE 20 MEQ: 14.9 INJECTION, SOLUTION INTRAVENOUS at 06:08

## 2022-08-10 RX ADMIN — POTASSIUM CHLORIDE 60 MEQ: 14.9 INJECTION, SOLUTION INTRAVENOUS at 10:08

## 2022-08-10 RX ADMIN — Medication 50 MCG/KG/MIN: at 03:08

## 2022-08-10 RX ADMIN — SODIUM CHLORIDE: 9 INJECTION, SOLUTION INTRAVENOUS at 07:08

## 2022-08-10 RX ADMIN — NITROGLYCERIN 100 MCG: 5 INJECTION, SOLUTION INTRAVENOUS at 02:08

## 2022-08-10 RX ADMIN — FENTANYL CITRATE 50 MCG: 50 INJECTION INTRAMUSCULAR; INTRAVENOUS at 08:08

## 2022-08-10 RX ADMIN — ACETAMINOPHEN 999.01 MG: 160 SOLUTION ORAL at 09:08

## 2022-08-10 RX ADMIN — TRANEXAMIC ACID 1000 MG: 100 INJECTION, SOLUTION INTRAVENOUS at 10:08

## 2022-08-10 RX ADMIN — NOREPINEPHRINE BITARTRATE 0.04 MCG/KG/MIN: 1 INJECTION, SOLUTION, CONCENTRATE INTRAVENOUS at 02:08

## 2022-08-10 RX ADMIN — HYDROMORPHONE HYDROCHLORIDE 1 MG: 1 INJECTION, SOLUTION INTRAMUSCULAR; INTRAVENOUS; SUBCUTANEOUS at 08:08

## 2022-08-10 RX ADMIN — POTASSIUM CHLORIDE 20 MEQ: 14.9 INJECTION, SOLUTION INTRAVENOUS at 05:08

## 2022-08-10 RX ADMIN — ONDANSETRON 1 G: 2 INJECTION INTRAMUSCULAR; INTRAVENOUS at 02:08

## 2022-08-10 RX ADMIN — EPINEPHRINE 20 MCG: 1 INJECTION, SOLUTION, CONCENTRATE INTRAVENOUS at 10:08

## 2022-08-10 RX ADMIN — NOREPINEPHRINE BITARTRATE 16 MCG: 1 INJECTION, SOLUTION, CONCENTRATE INTRAVENOUS at 03:08

## 2022-08-10 RX ADMIN — ASPIRIN 325 MG ORAL TABLET 325 MG: 325 PILL ORAL at 09:08

## 2022-08-10 RX ADMIN — DEXMEDETOMIDINE HYDROCHLORIDE 0.4 MCG/KG/HR: 4 INJECTION INTRAVENOUS at 08:08

## 2022-08-10 RX ADMIN — HEPARIN SODIUM 5000 UNITS: 1000 INJECTION, SOLUTION INTRAVENOUS; SUBCUTANEOUS at 10:08

## 2022-08-10 RX ADMIN — PROPOFOL 150 MG: 10 INJECTION, EMULSION INTRAVENOUS at 08:08

## 2022-08-10 RX ADMIN — SODIUM CHLORIDE 3 UNITS/HR: 9 INJECTION, SOLUTION INTRAVENOUS at 11:08

## 2022-08-10 RX ADMIN — SODIUM CHLORIDE: 9 INJECTION, SOLUTION INTRAVENOUS at 02:08

## 2022-08-10 RX ADMIN — SODIUM CHLORIDE, SODIUM GLUCONATE, SODIUM ACETATE, POTASSIUM CHLORIDE, MAGNESIUM CHLORIDE, SODIUM PHOSPHATE, DIBASIC, AND POTASSIUM PHOSPHATE: .53; .5; .37; .037; .03; .012; .00082 INJECTION, SOLUTION INTRAVENOUS at 09:08

## 2022-08-10 RX ADMIN — POTASSIUM CHLORIDE: 200 INJECTION, SOLUTION INTRAVENOUS at 02:08

## 2022-08-10 RX ADMIN — HEPARIN SODIUM 31000 UNITS: 1000 INJECTION, SOLUTION INTRAVENOUS; SUBCUTANEOUS at 09:08

## 2022-08-10 RX ADMIN — SODIUM CHLORIDE 1 MG/KG/HR: 9 INJECTION, SOLUTION INTRAVENOUS at 09:08

## 2022-08-10 RX ADMIN — LIDOCAINE HYDROCHLORIDE 50 ML: 10 INJECTION, SOLUTION EPIDURAL; INFILTRATION; INTRACAUDAL at 08:08

## 2022-08-10 RX ADMIN — MUPIROCIN 1 G: 20 OINTMENT TOPICAL at 09:08

## 2022-08-10 RX ADMIN — Medication 20 MG: at 02:08

## 2022-08-10 RX ADMIN — SODIUM CHLORIDE, SODIUM GLUCONATE, SODIUM ACETATE, POTASSIUM CHLORIDE, MAGNESIUM CHLORIDE, SODIUM PHOSPHATE, DIBASIC, AND POTASSIUM PHOSPHATE: .53; .5; .37; .037; .03; .012; .00082 INJECTION, SOLUTION INTRAVENOUS at 10:08

## 2022-08-10 NOTE — SUBJECTIVE & OBJECTIVE
Interval HPI:   Overnight events: Remains in SICU. NAEON. Intubated. BG within goal ranges on IV intensive insulin protocol. Diet NPO Except for: Sips with Medication  Eating:   NPO  Nausea: No  Hypoglycemia and intervention: No  Fever: No  TPN and/or TF: No  If yes, type of TF/TPN and rate: n/a    PMH, PSH, FH, SH reviewed     ROS:  Unable to obtain due to: Intubated    Review of Systems    Current Medications and/or Treatments Impacting Glycemic Control  Immunotherapy:    Immunosuppressants       None          Steroids:   Hormones (From admission, onward)                None          Pressors:    Autonomic Drugs (From admission, onward)                Start     Stop Route Frequency Ordered    08/10/22 1745  NORepinephrine 4 mg in dextrose 5% 250 mL infusion (premix) (titrating)        Question Answer Comment   Begin at (in mcg/kg/min): 0.04    Titrate by: (in mcg/kg/min) 0.02    Titrate interval: (in minutes) 5    Titrate to maintain: (MAP or SBP) MAP    Greater than: (in mmHg) 65    Maximum dose: (in mcg/kg/min) 16        -- IV Continuous 08/10/22 1632    08/10/22 1530  EPINEPHrine 5 mg in dextrose 5% 250 mL infusion (premix)        Question Answer Comment   Begin at (in mcg/kg/min): 0.02    Titrate by: (in mcg/kg/min) 0.02    Titrate interval: (in minutes) 5    Titrate to maintain: (SBP or MAP or Cardiac Index) MAP    Greater than: (in mmHg) 65    Cardiac index greater than: (in L/min) 2.2    Maximum dose: (in mcg/kg/min) 2        -- IV Continuous 08/10/22 1523          Hyperglycemia/Diabetes Medications:   Antihyperglycemics (From admission, onward)                Start     Stop Route Frequency Ordered    08/10/22 1530  insulin regular in 0.9 % NaCl 100 unit/100 mL (1 unit/mL) infusion        Question Answer Comment   Insulin rate changes (DO NOT MODIFY ANSWER) [email or web address]\InsulinInfusions\CTS [email or web address] [id number]    Enter initial dose (Units/hr): 1        -- IV Continuous 08/10/22 1523              PHYSICAL EXAMINATION:  Vitals:    08/10/22 1609   BP:    Pulse: (!) 54   Resp: 20     Body mass index is 47.93 kg/m².    Physical Exam  Constitutional: Well developed, obese, NAD.  ENT: External ears no masses with nose patent  Neck: Supple; trachea midline  Cardiovascular: Normal heart sounds, no LE edema. DP +2 bilaterally.  Lungs: Normal effort; lungs anterior bilaterally clear to auscultation.  Intubated on a ventilator.  Abdomen: Soft, no masses, no hernias.  Hypoactive BS noted.  MS: No clubbing or cyanosis of nails noted; unable to assess gait.  Skin: No rashes, lesions, or ulcers; no nodules.  Mid-sternal incision with telfa island dressing, CDI.  CT x 2.  LLE wrapped with ACE wrap.  Psychiatric: MARIA ESTHER  Neurological: MARIA ESTHER  Foot: Nails in good condition, no amputations noted

## 2022-08-10 NOTE — ANESTHESIA PROCEDURE NOTES
[facility]    Diagnosis: MR/TR  Patient location during procedure: OR  Surgery related to: MVr/TVr  Exam type: Baseline    Staffing  Performed: anesthesiologist     Anesthesiologist: Tal Kngiht Jr., MD        Anesthesiologist Present  Yes      Setup & Induction  Patient preparation: bite block inserted  Probe Insertion: easy  Exam: completeDoppler Echo: 2D, 3D, continuous wave Doppler and pulse wave Doppler.  Exam     Left Heart  Left Atruim: dilated and severly enlarged (men >5.2; women >4.7) and 8        LVAD  Estimated Ejection Fraction: > 55% normal  Regional Wall Abnormalities: no RWMA        Left Ventricle Diastolic Function  E Velocity: 154 cm/s  A Velocity: 22 cm/s  Deceleration time: 185 ms  E/A Ratio: 7, restrictive (>/=2)    Right Heart  Right Ventricle: dilated  Right Ventricle Function: normal  Right Atria:  severly enlarged    Intra Atrial Septum  PFO: no shunt by color flow doppler  no IAS aneurysm  no lipomatous hypertrophy  no Atrial Septal Defect (Asd)    Right Ventricle  Size: dilated    Aortic Valve:  Morphology: trileaflet    Regurgitation: mild (2+) aortic regurgitation      Mitral Valve:   Morphology:normal  Jet Description: severe    Tricuspid Valve:  Morphology: normal  Regurgitation: severe, TV Vena Contracta and TV Hepatic Vein flow:systolic reversal    Pulmonic Valve:  Morphology:normal  Regurgitation(color flow): none    Great Vessels  Ascending Aorta Atherosclerosis: 1=nl-min dz  Aortic Arch Atherosclerosis: 1=nl-min dz  Descending Aorta Atherosclerosis: 1=nl-min dz      Effusions none    Summary    Other Findings   Normal biventricular function.  Severe Bi atrial enlargement.  Severe MR. Mildly thickened mitral leaflets. Normal function. Central MR jet 2/2 annular dilation. VC >7  Severe TR with hepatic flow reversal and VC >7. Leaflet morphology appears normal  Mild AI.    Post:  Normal biventricular function  No MR, No TR  MV mean gradient 2 mmHg, Max 4   No dissection

## 2022-08-10 NOTE — ASSESSMENT & PLAN NOTE
61 y.o. female with chronic diastolic HF (EF 60%), pAF (on Eliquis), HTN, HLD, obesity s/p gastric sleeve (2017) now s/p MVR, TVR, and MAZE on 8/10/22.      Neuro/Psych:   -- Sedation: Propofol. Wean as tolerated with plans to extubate    -- Pain: PRN Oxy + Dilaudid  -- Scheduled Tylenol             Cards:   -- S/P MVR, TVR, MAZE on 8/10/22  -- HDS on 0.04 epi, 0.04 levo  -- Ventricular pacing wires. Backup paced at 45 BPM  -- MAP > 65, Syst < 140  -- Cleviprex PRN  -- Aspirin 325mg tonight pending postoperative coags and chest tube output      Pulm:   -- Goal O2 sat > 90%  -- ABG PRN  -- Wean vent as tolerated  -- Plan to extubate tonight if stable  -- Mediastinal chest tubes x2 and pleural chest tube x1 to suction      Renal:  -- Keep wang for strict I/O  -- Trend BUN/Cr       FEN / GI:   -- Replace lytes as needed  -- Nutrition: NPO  -- GI ppx: famotidine  -- Bowel reg: miralax  -- 5% Albumin PRN      ID:   -- Tm: afebrile; WBC stable  -- Autumn-op ancef      Heme/Onc:   -- H/H stable   -- Daily CBC  -- 150mL Cell saver given back  -- Post-op coags pending  -- Aspirin 325mg QD      Endo:   -- BG goal 140-180  -- Insulin gtt      PPx:   Feeding: NPO  Analgesia/Sedation: Propofol / PRN Oxy + Dilaudid  Thromboembolic prevention: SCDs  HOB >30: Yes  Stress Ulcer ppx: famotidine  Glucose control: Critical care goal 140-180 g/dl, ISS     Lines/Drains/Airway: CVC RIJ, Wang, Chest tubes x 3, ventricular pacing wires, L radial A-line      Dispo/Code Status/Palliative:   -- SICU / Full Code.

## 2022-08-10 NOTE — ANESTHESIA PROCEDURE NOTES
Central Line    Diagnosis: Mitral Regurgitation  Patient location during procedure: done in OR    Staffing  Authorizing Provider: Tal Knight Jr., MD  Performing Provider: Darren Blair MD    Staffing  Performed: resident/CRNA   Anesthesiologist: Tal Knight Jr., MD  Resident/CRNA: Darren Blair MD  Anesthesiologist was present at the time of the procedure.  Preanesthetic Checklist  Completed: patient identified, IV checked, site marked, risks and benefits discussed, surgical consent, monitors and equipment checked, pre-op evaluation, timeout performed and anesthesia consent given  Indication   Indication: hemodynamic monitoring, vascular access, med administration     Anesthesia   general anesthesia    Central Line   Skin Prep: skin prepped with ChloraPrep, skin prep agent completely dried prior to procedure  Sterile Barriers Followed: Yes    All five maximal barriers used- gloves, gown, cap, mask, and large sterile sheet    hand hygiene performed prior to central venous catheter insertion  Location: right internal jugular.   Catheter type: introducer (MAC)  Catheter Size: 9 Fr  Ultrasound: vascular probe with ultrasound   Vessel Caliber: medium, patent  Vascular Doppler:  not done, compressibility normal  Needle advanced into vessel with real time Ultrasound guidance.  Guidewire confirmed in vessel.  sterile gel and probe cover used in ultrasound-guided central venous catheter insertion   Manometry: Venous cannualation confirmed by visual estimation of blood vessel pressure using manometry.  Insertion Attempts: 1    Post-Procedure   X-Ray: no pneumothorax on x-ray   Adverse Events:none      Guidewire Guidewire removed intact.

## 2022-08-10 NOTE — ASSESSMENT & PLAN NOTE
BG goal 110-140 (CTS protocol)     Continue IV insulin infusion protocol  Requires intensive BG monitoring while on protocol     ** Please call Endocrine for any BG related issues **    ** Please notify Endocrine for any change and/or advance in diet**    Discharge planning: TBLENY

## 2022-08-10 NOTE — TRANSFER OF CARE
"Anesthesia Transfer of Care Note    Patient: Magalys Bass    Procedure(s) Performed: Procedure(s) (LRB):  VALVULOPLASTY,MITRAL VALVE (N/A)  REPAIR, TRICUSPID VALVE (N/A)  KIRKPATRICK MAZE PROCEDURE (N/A)    Patient location: ICU    Anesthesia Type: general    Transport from OR: Transported from OR intubated on 100% O2 by AMBU with assisted ventilation. Continuous ECG monitoring in transport. Continuous SpO2 monitoring in transport. Continuos invasive BP monitoring in transport    Post pain: adequate analgesia    Post assessment: no apparent anesthetic complications    Post vital signs: stable    Level of consciousness: sedated    Nausea/Vomiting: no nausea/vomiting    Complications: none    Transfer of care protocol was followed      Last vitals:   Visit Vitals  /68   Pulse (!) 54   Resp 20   Ht 5' 5" (1.651 m)   LMP 08/01/2014   SpO2 100%   Breastfeeding No   BMI 47.93 kg/m²     "

## 2022-08-10 NOTE — CARE UPDATE
Patient received from OR intubated and mechanically ventilated. Tube secured and patent 24 at the lip. ABGs done and EKG pending. Patient suctioned with scant secretions. Will continue to monitor

## 2022-08-10 NOTE — HPI
Reason for Consult: Management of Hyperglycemia     Surgical Procedure and Date:   8/10/22  VALVULOPLASTY,MITRAL VALVE (N/A)  REPAIR, TRICUSPID VALVE (N/A)  KIRKPATRICK MAZE PROCEDURE (N/A)       HPI:   Patient is a 61 y.o. female with a diagnosis of HTN, pAF (on eliquis), GERD, obesity (BMI 48, s/p gastric sleeve 2017), former smoker, HFpEF (EF ~60%), moderate-to-severe MR, moderate tricuspid regurgitation (severe on prior ECHO 3/15/22), mild AR who now presents for the above procedure(s). Endocrinology consulted for management of hyperglycemia.       Lab Results   Component Value Date    HGBA1C 5.2 08/09/2022

## 2022-08-10 NOTE — PROGRESS NOTES
Autotransfusion/Rapid Infusion Record:      08/10/2022  Autotransfusionist:  Xenia Milner    Surgeon(s) and Role:     * Manish Morris MD - Primary     * Monae Shannon MD - Fellow  Anesthesiologist:  Tal Knight Jr., MD    Past Medical History:   Diagnosis Date    Anticoagulant long-term use     Depression     GERD (gastroesophageal reflux disease)     Hypertension     Medical history non-contributory     Paroxysmal A-fib        Procedure(s) (LRB):  VALVULOPLASTY,MITRAL VALVE (N/A)  REPAIR, TRICUSPID VALVE (N/A)  KIRKPATRICK MAZE PROCEDURE (N/A)     3:50 PM    Equipment:    Cell Saver     R.I.S.  : [employer] Model: CATSmart or CATSplus : For Your Imagination   Model: [id number]     Serial number: [id number]   Serial number:    Disposable lot #:    Disposable lot #:      Were extra cardiotomies used for cell saver?  No   if yes, #:      Solutions:  Anticoagulant: ACD-A   Expiration date: 10/23 Volume used: 500   Wash solution: 0.9% NaCl   Expiration date: 06/25 Volume used: 7834     Cell saver checklist  Time completed: 07:06           [x]   Circuit assembled correctly     [x]   Cell saver powered and operational     [x]   Vacuum connected, functional, adjust to max -150mmHg     [x]   Anticoagulant drip rate adjusted     [x]   Transfer bag properly labeled with patient name, expiration time, volume,       anticoagulant, OR number, and initials     [x]   Cell saver disinfected after use (completed at end of case)       Cell Saver volumes:    Total volume processed:     6103 mL     Total volume pRBCs recovered     876 mL     Volume pRBCs infused     876 mL         RIS checklist   Time completed:  []   RIS circuit assembled correctly     []   RIS power and operational     []   RIS disinfected after use (completed at end of case)       RIS volumes:    Total volume infused:    (see anesthesia record for blood       product information)   0 mL       Additional comments:

## 2022-08-10 NOTE — OP NOTE
DATE OF PROCEDURE:  08/10/2022     ATTENDING SURGEON:  Manish Morris M.D.    ASSISTANT: Monae Shannon MD, (Cardiothoracic Resident)     PREOPERATIVE DIAGNOSES:  1.  Severe mitral regurgitation.  2.  Atrial fibrillation  3.  Severe tricuspid regurgitation  4.  Chronic diastolic heart failure  5.  Hypertension  6.  Hyperlipidemia  7.  Obesity  8.  Pulmonary hypertension    POSTOPERATIVE DIAGNOSES:  1.  Severe mitral regurgitation.  2.  Atrial fibrillation  3.  Severe tricuspid regurgitation  4.  Chronic diastolic heart failure  5.  Hypertension  6.  Hyperlipidemia  7.  Obesity  8.  Pulmonary hypertension    OPERATION PERFORMED:      1)  Mitral valve repair with a central Otto stitch and a 30 mm Medtronic Future band annuloplasty.    2)  Tricuspid valve repair with a 26 mm Medtronic Contour band    3)   Maze procedure with complete left and right atrial lesion sets and resection of left atrial appendage       ANESTHESIA:  General endotracheal.     ESTIMATED BLOOD LOSS:  100 mL.      BRIEF HISTORY:  Ms. Bass is a very pleasant 61 year old woman who initially presented with dyspnea on exertion. She had a thoughtful and thorough evaluation which demonstrated severe mitral regurgitation and tricuspid regurgitation. She now presents for surgical correction.      PROCEDURE IN DETAIL:  After obtaining informed and written consent, the patient   was brought to the Operating Room and placed on the operating table in supine   position.  After induction of adequate general endotracheal anesthesia, the   patient's chest, abdomen, pelvis and bilateral lower extremities were prepped   and draped in the usual sterile fashion.  An upper midline skin incision was   made, and a median sternotomy was performed.  A pericardial well was created.    The patient was systemically heparinized.  Cannulation sutures were placed in   the aorta and in the superior vena cava.  The aortic cannula was inserted,   followed by the  venous.  An IVC cannula was also placed.  Antegrade and   retrograde cardioplegia catheters were placed.  The patient was then put on   cardiopulmonary bypass.  Once on bypass, circumferential control was obtained   over the superior vena cava.  The aortic crossclamp was applied, and the heart   was arrested using cold blood enhanced antegrade cardioplegia.  A prompt   electromechanical arrest was achieved.    Once the cardioplegia was all in, we first addressed the left-sided pulmonary veins.  A circumferential lesion was made using the AtriCure device.  This lesion was duplicated.  The left atrial appendage was then opened.  It was free of thrombus.  A connecting lesion was made from the appendage down onto the left superior pulmonary vein, again   using the AtriCure device.  The left atrial appendage was then resected using   the [employer] stapler.  We then turned our attention to the right-sided pulmonary   veins, where again a double circumferential lesion was made using the AtriCure   device.  A left atriotomy was then made near the right-sided pulmonary veins.    Through the left atriotomy, connecting lesions were made from the atriotomy   first to the left superior pulmonary vein, and secondly from the atriotomy to   the left inferior pulmonary vein.  Finally, a connecting lesion was made that   was directed from the atriotomy towards the junction of the P2 and P3 scallops   of the posterior leaflet of the mitral valve.  Although this lesion was directed   towards the junction of the P2 and P3 scallops, it did not reach the mitral   annulus.  It was created using the AtriCure device.  The lesion was completed to   the level of the mitral annulus using the AtriCure cryoprobe. The coronary sinus lesion was then created using the Atricure cryoprobe. The Narvaez   retractor was then placed for exposure, and the mitral valve was evaluated.    The leaflets appeared structurally normal.  Upon testing, there was a  central jet.  Multiple nonpledgeted 2-0 Ethibond sutures were placed circumferentially around the annulus. The annulus was   sized, and a 30 mm complete ring was selected.  The ring was brought up, the annuloplasty   sutures were passed through it, and it was slid into position.  It seated   nicely.  The sutures were tied and then cut.  The valve was again tested, and   no mitral regurgitation was seen.  The left atriotomy was then closed in a   single layer using running 4-0 Prolene suture.  Prior to closing the left   atrium, de-airing maneuvers were performed.      Once the left atrium was closed, the cannulas were repositioned. A right atriotomy was made.  Silk sutures were placed for exposure.  The tricuspid valve was evaluated.  The annulus was severely dilated.  The leaflets were structurally normal.  Multiple non pledgetted 2-0 Ethibond sutures were placed along the tricuspid annulus from the commissure between the septal and anterior leaflets to midway along the septal leaflet.  The annulus was sized, and a 26 mm rigid band was selected. The hotshot was given retrograde. Once the hotshot was all in, the aortic crossclamp was removed.   The band was brought up, the sutures were passed through it, and it was slid into position.  It seated nicely.  The sutures were tied and then cut.  We attempted to test the valve by occluding the pulmonary artery.  The coaptation appeared acceptable.  The right atriotomy was then closed in a single layer using running 4-0 Prolene suture.     Lesions were created first into the superior vena cava and then secondly into the inferior vena cava, both using the Atricure cryoprobe.The patient was subsequently weaned from cardiopulmonary bypass.  The patient did separate easily from   bypass.  Once off bypass, all surgical sites were inspected.  There was good   hemostasis.  The valve was evaluated using CASTILLO.  There was no residual mitral   regurgitation seen.  The test   dose of  protamine was administered, and this was well tolerated.  The total dose   was then given.  [place] through the total dose, all cannulas were removed.    All surgical sites were again inspected, again there was good hemostasis.    Ventricular pacing wires were placed.  Drains were placed.  After again   confirming adequate hemostasis, the patient's chest was closed using eight #6   stainless steel wires to reapproximate the sternum.  The overlying soft tissues   were reapproximated using absorbable suture material.  The patient's chest was   washed and dried, and a dry dressing was applied.  The patient tolerated the   procedure well, there were no complications.  At the conclusion of the case,   sponge and instrument counts were correct.

## 2022-08-10 NOTE — BRIEF OP NOTE
[facility] - Surgical Intensive Care  Cardiothoracic Surgery  Operative Note    SUMMARY     Date of Procedure: 8/10/2022     Procedure: Procedure(s) (LRB):  1)  VALVULOPLASTY,MITRAL VALVE with a central Otto stitch and a 30 mm Medtronic Future ring annuloplasty  28677    2)  REPAIR, TRICUSPID VALVE with a 26 mm Medtronic Contour ring annuloplasty 30661    3)  KIRKPATRICK MAZE PROCEDURE with complete left and right atrial lesion sets and resection of left atrial appendage  50359    Surgeon(s) and Role:     * Manish Morris MD - Primary     * Monae Shannon MD - Fellow    Assisting Surgeon: None    Pre-Operative Diagnosis: Paroxysmal atrial fibrillation [I48.0]  Nonrheumatic mitral valve regurgitation [I34.0]  Nonrheumatic tricuspid valve regurgitation [I36.1]    Post-Operative Diagnosis: Post-Op Diagnosis Codes:     * Paroxysmal atrial fibrillation [I48.0]     * Nonrheumatic mitral valve regurgitation [I34.0]     * Nonrheumatic tricuspid valve regurgitation [I36.1]    Anesthesia: General    Operative Findings (including complications, if any): Restricted posterior leaflet. No MR on post CASTILLO. Mean gradient 2 mmHg.       Estimated Blood Loss (EBL): 100 mL         Implants:   Implant Name Type Inv. Item Serial No.  Lot No. LRB No. Used Action   RING FUTURE MITRAL CG 30MM - [id number]  RING FUTURE MITRAL CG 30MM [id number] [employer] [facility]  N/A 1 Implanted   RING ANNULOPLASTY TRICUSPID - [id number]  RING ANNULOPLASTY TRICUSPID [id number] [employer] [facility]  N/A 1 Implanted       Specimens:   Specimen (24h ago, onward)             Start     Ordered    08/10/22 1520  Specimen to Pathology, Surgery Cardiovascular  Once        Comments: Pre-op Diagnosis: Paroxysmal atrial fibrillation [I48.0]Nonrheumatic mitral valve regurgitation [I34.0]Nonrheumatic tricuspid valve regurgitation [I36.1]Procedure(s):Valvuloplasty, MitralREPAIR, TRICUSPID VALVECOX MAZE PROCEDURE Number of specimens: 1Name of specimens: 1. Left atrial  appendage- perm     References:    Click here for ordering Quick Tip   Question Answer Comment   Procedure Type: Cardiovascular    Specimen Class: Routine/Screening    Which provider would you like to cc? JOSHUA ROSALES    Release to patient Immediate        08/10/22 1520                        Attestation:  I was present and scrubbed for the procedure.

## 2022-08-10 NOTE — HPI
61 y.o. female with chronic diastolic HF (EF 60%), pAF (on Eliquis), HTN, HLD, obesity s/p gastric sleeve (2017). Saw Dr. Matute who has concerns for restrictive disease / amyloid due to echo findings. Patient states that she was diagnosed with a heart murmur ~ 2 years ago. States that she is not really SOB when walking on a flat surface but more so with stairs. States that she used to walk for exercise but has not been doing that, so feels deconditioning also plays a part in her breathing issues. Intermittent swelling in lower extremities. Reports energy level is not what it used to be. Denies any dizziness, orthopnea, chest pain, palpitations. No prior strokes, seizures, blood clots, stents, or sternotomies. Quit smoking 30 years ago. No significant drinking history.      At last visit 2/2/22 with Dr. Huang patient was felt to be a good surgical candidate as both her mitral and tricuspid valves could be correct, as well as her afib. However, there was some concern that the patient may have cardiac amyloidosis and a final decision was pending further workup. The amyloid testing resulted negative. Here today to discuss next steps. Reports there has been no change in her symptoms.     She is now s/p elective MVR, TVR, MAZE procedure on 8/10/22. The procedure was performed without apparent complication and she was transferred to the SICU for postoperative care and management. Intraoperatively she received 5.5mL of crystalloid and 150 mL of Cell Saver back. Her reported urine output during the case was 1.1L. Her postoperative echocardiogram revealed normal biventricular function with no notable mitral regurgitation. She arrives to the SICU intubated, sedated, and hemodynamically stable on 0.04 of epi, and 0.04 of levo.

## 2022-08-10 NOTE — ANESTHESIA PROCEDURE NOTES
Arterial    Diagnosis: MR    Patient location during procedure: done in OR    Staffing  Authorizing Provider: Tal Knight Jr., MD  Performing Provider: Darren Blair MD    Anesthesiologist was present at the time of the procedure.  Arterial  Skin Prep: chlorhexidine gluconate  Local Infiltration: lidocaine  Orientation: left  Location: radial    Catheter Size: 20 G  Catheter placement by Anatomical landmarks. Heme positive aspiration all ports. Insertion Attempts: 1  Assessment  Dressing: secured with tape and tegaderm  Patient: Tolerated well

## 2022-08-10 NOTE — SUBJECTIVE & OBJECTIVE
Follow-up For: Procedure(s) (LRB):  VALVULOPLASTY,MITRAL VALVE (N/A)  REPAIR, TRICUSPID VALVE (N/A)  KIRKPATRICK MAZE PROCEDURE (N/A)    Post-Operative Day: Day of Surgery     Past Medical History:   Diagnosis Date    Anticoagulant long-term use     Depression     GERD (gastroesophageal reflux disease)     Hypertension     Medical history non-contributory     Paroxysmal A-fib        Past Surgical History:   Procedure Laterality Date    CORONARY ANGIOGRAPHY N/A 4/13/2022    Procedure: ANGIOGRAM, CORONARY ARTERY;  Surgeon: Isauro Perdomo MD;  Location: [facility] CATH LAB;  Service: Cardiology;  Laterality: N/A;    FINGER SURGERY Left     index fracture repair    GALLBLADDER SURGERY  08/2021    KNEE ARTHROSCOPY      SHOULDER ARTHROSCOPY      SHOULDER SURGERY         Review of patient's allergies indicates:   Allergen Reactions    Morphine Itching       Family History       Problem Relation (Age of Onset)    Stroke Mother          Tobacco Use    Smoking status: Former Smoker     Types: Cigarettes    Smokeless tobacco: Never Used   Substance and Sexual Activity    Alcohol use: No    Drug use: No    Sexual activity: Not Currently      Review of Systems   Unable to perform ROS: Intubated   Objective:     Vital Signs (Most Recent):  Pulse: (!) 54 (08/10/22 1609)  Resp: 20 (08/10/22 1609)  BP: 134/68 (08/10/22 0615)  SpO2: 100 % (08/10/22 1609)   Vital Signs (24h Range):  Pulse:  [50-54] 54  Resp:  [20] 20  SpO2:  [100 %] 100 %  BP: (134)/(68) 134/68        Body mass index is 47.93 kg/m².      Intake/Output Summary (Last 24 hours) at 8/10/2022 1623  Last data filed at 8/10/2022 1620  Gross per 24 hour   Intake 4700 ml   Output 1350 ml   Net 3350 ml       Physical Exam  Constitutional:       Comments: Intubated and sedated   HENT:      Head: Normocephalic and atraumatic.   Eyes:      Pupils: Pupils are equal, round, and reactive to light.   Neck:      Comments: RI CVC  Cardiovascular:      Rate and Rhythm: Normal rate and regular  rhythm.      Comments: Midline sternotomy c/d with dressing in place    2 mediastinal and 1 pleural chest tube to suction    V wires in place. Back up paced at 45  Abdominal:      General: There is no distension.      Palpations: Abdomen is soft.   Genitourinary:     Comments: Yin in place  Musculoskeletal:      Right lower leg: No edema.      Left lower leg: No edema.      Comments: L radial arterial line   Skin:     General: Skin is warm and dry.      Capillary Refill: Capillary refill takes less than 2 seconds.   Neurological:      Comments: Sedated       Vents:  Vent Mode: A/C (08/10/22 1609)  Ventilator Initiated: Yes (08/10/22 1600)  Set Rate: 20 BPM (08/10/22 1609)  Vt Set: 450 mL (08/10/22 1609)  PEEP/CPAP: 5 cmH20 (08/10/22 1609)  Oxygen Concentration (%): 100 (08/10/22 1609)  Peak Airway Pressure: 22 cmH2O (08/10/22 1609)  Plateau Pressure: 18 cmH20 (08/10/22 1609)  Total Ve: 9.97 mL (08/10/22 1609)  Negative Inspiratory Force (cm H2O): 0 (08/10/22 1609)  F/VT Ratio<105 (RSBI): (!) 40 (08/10/22 1609)    Lines/Drains/Airways       Central Venous Catheter Line  Duration              Introducer with Double Lumen 08/10/22 0848 <1 day    Introducer 08/10/22 0848 <1 day              Drain  Duration                  Chest Tube 08/10/22 1440 1 Right Mediastinal 19 Fr. <1 day         Chest Tube 08/10/22 1441 2 Left Mediastinal 19 Fr. <1 day         Chest Tube 08/10/22 1441 3 Right Pleural 19 Fr. <1 day         Urethral Catheter 08/10/22 0847 Straight-tip;Temperature probe;Non-latex 14 Fr. <1 day              Airway  Duration                  Airway - Non-Surgical 08/10/22 0835 <1 day              Arterial Line  Duration             Arterial Line 08/10/22 0843 Left Radial <1 day              Line  Duration                  Pacer Wires 08/10/22 1430 <1 day                    Significant Labs:    CBC/Anemia Profile:  Recent Labs   Lab 08/09/22  1143 08/10/22  1041 08/10/22  1333 08/10/22  1401 08/10/22  1608   WBC  2.54*  --   --   --   --    HGB 12.8  --   --   --   --    HCT 39.3   < > 27* 30* 33*     --   --   --   --    MCV 76*  --   --   --   --    RDW 14.0  --   --   --   --     < > = values in this interval not displayed.        Chemistries:  Recent Labs   Lab 08/09/22  1143      K 3.2*      CO2 29   BUN 11   CREATININE 1.0   CALCIUM 9.2   ALBUMIN 3.3*   PROT 6.9   BILITOT 0.7   ALKPHOS 132   ALT 18   AST 28       ABGs:   Recent Labs   Lab 08/10/22  1608   PH 7.377   PCO2 39.4   HCO3 23.2*   POCSATURATED 100   BE -2     Coagulation:   Recent Labs   Lab 08/10/22  1603   INR 1.3*   APTT 40.9*     All pertinent labs within the past 24 hours have been reviewed.    Significant Imaging: I have reviewed all pertinent imaging results/findings within the past 24 hours.

## 2022-08-10 NOTE — ANESTHESIA PREPROCEDURE EVALUATION
[facility]  Anesthesia Pre-Operative Evaluation        Patient Name: Magalys Bass  YOB: 1960  MRN: [id number]    SUBJECTIVE:     Pre-operative Evaluation for Procedure(s) (LRB):  Valvuloplasty, Mitral (N/A)  REPLACEMENT, MITRAL VALVE (N/A)  REPAIR, TRICUSPID VALVE (N/A)  REPLACEMENT, TRICUSPID VALVE (N/A)  MAZE (N/A)     08/09/2022    Magalys Bass is a 61 y.o. female with a PMHx significant for HTN, pAF (on eliquis), GERD, obesity (BMI 48, s/p gastric sleeve 2017), former smoker, HFpEF (EF ~60%), moderate-to-severe MR, moderate tricuspid regurgitation (severe on prior ECHO 3/15/22), mild AR.     The patient now presents for the above procedure(s).    TTE (8/9/22):  Interpretation Summary  · The left ventricle is normal in size with concentric remodeling and normal systolic function.   · The estimated ejection fraction is 58%.   · Normal left ventricular diastolic function.   · Severe left atrial enlargement.   · Moderate right ventricular enlargement with low normal right ventricular systolic function.   · Moderate right atrial enlargement.   · Mild aortic regurgitation.   · The MR is at least moderate and possibly moderate -severe ( 2-3+)  · Moderate tricuspid regurgitation.    · Normal central venous pressure (3 mmHg).  · The estimated PA systolic pressure is 34 mmHg.    Cath (4/13/22):  · There was non-obstructive coronary artery disease.  · No significant CAD.    Previous Airway: None documented.    Patient Active Problem List   Diagnosis    Ankle fracture, bimalleolar, closed    Other postprocedural status(V45.89)    Nonrheumatic mitral valve regurgitation    Paroxysmal atrial fibrillation    Hypertension    Hyperlipidemia    Morbid obesity    Chronic diastolic heart failure    Nonrheumatic tricuspid valve regurgitation    Pre-op testing    Bilateral carotid artery stenosis     Past Medical History:   Diagnosis Date    Anticoagulant long-term use      Depression     GERD (gastroesophageal reflux disease)     Hypertension     Medical history non-contributory     Paroxysmal A-fib        Review of patient's allergies indicates:   Allergen Reactions    Morphine Itching       Current Outpatient Medications   Medication Instructions    amLODIPine (NORVASC) 5 mg, Oral, Nightly    apixaban (ELIQUIS) 5 mg, Oral, 2 times daily    aspirin (ECOTRIN) 81 mg, Oral, Nightly    benazepriL (LOTENSIN) 40 mg, Oral, Nightly    diclofenac sodium (VOLTAREN) 1 % Gel Topical (Top)    ergocalciferol (ERGOCALCIFEROL) 50,000 Units, Oral, Every 7 days    famotidine (PEPCID) 20 mg, Oral, 2 times daily    fluticasone propionate (FLONASE) 50 mcg/actuation nasal spray 1 spray, Each Nostril, Daily    furosemide (LASIX) 20 mg, Oral, Daily PRN    hydroCHLOROthiazide (HYDRODIURIL) 25 mg, Oral, Nightly    meloxicam (MOBIC) 15 mg, Oral, Daily    metoprolol succinate (TOPROL-XL) 25 mg, Oral, Daily    omega-3 acid ethyl esters (LOVAZA) 2 g, Oral, 2 times daily    omeprazole (PRILOSEC) 40 mg, Oral, Every morning    potassium chloride SA (K-DUR,KLOR-CON) 20 MEQ tablet 20 mEq, Oral, Daily    SUPREP BOWEL PREP KIT 17.5-3.13-1.6 gram SolR Oral       Past Surgical History:   Procedure Laterality Date    CORONARY ANGIOGRAPHY N/A 4/13/2022    Procedure: ANGIOGRAM, CORONARY ARTERY;  Surgeon: Isauro Perdomo MD;  Location: [facility] CATH LAB;  Service: Cardiology;  Laterality: N/A;    FINGER SURGERY Left     index fracture repair    GALLBLADDER SURGERY  08/2021    KNEE ARTHROSCOPY      SHOULDER ARTHROSCOPY      SHOULDER SURGERY         Social History     Substance and Sexual Activity   Drug Use No     Alcohol Use: Not on file     Tobacco Use: Medium Risk    Smoking Tobacco Use: Former Smoker    Smokeless Tobacco Use: Never Used       OBJECTIVE:     Vital Signs Range (Last 24H):  Temp:  [36.7 °C (98.1 °F)]   Pulse:  [56-57]   BP: (135-142)/(67-85)   SpO2:  [100 %]       Significant  Labs    Heme Profile  Lab Results   Component Value Date    WBC 2.54 (L) 08/09/2022    HGB 12.8 08/09/2022    HCT 39.3 08/09/2022     08/09/2022       Coagulation Studies  Lab Results   Component Value Date    LABPROT 10.8 08/09/2022    INR 1.0 08/09/2022    APTT 26.5 08/09/2022       BMP  Lab Results   Component Value Date     08/09/2022    K 3.2 (L) 08/09/2022     08/09/2022    CO2 29 08/09/2022    BUN 11 08/09/2022    CREATININE 1.0 08/09/2022       Liver Function Tests  Lab Results   Component Value Date    AST 28 08/09/2022    ALT 18 08/09/2022    ALKPHOS 132 08/09/2022    BILITOT 0.7 08/09/2022    PROT 6.9 08/09/2022    ALBUMIN 3.3 (L) 08/09/2022       Lipid Profile  Lab Results   Component Value Date    CHOL 181 02/15/2022    HDL 60 02/15/2022    TRIG 111 02/15/2022       Endocrine Profile  Lab Results   Component Value Date    HGBA1C 5.2 08/09/2022         Cardiac Studies    EKG:   Results for orders placed or performed during the hospital encounter of 08/09/22   EKG 12-lead    Collection Time: 08/09/22  9:54 AM    Narrative    Test Reason : I48.0,I34.0,I36.1,Z01.818,    Vent. Rate : 064 BPM     Atrial Rate : 000 BPM     P-R Int : 000 ms          QRS Dur : 088 ms      QT Int : 408 ms       P-R-T Axes : 000 003 023 degrees     QTc Int : 420 ms    Atrial fibrillation  Abnormal ECG  When compared with ECG of 13-APR-2022 06:41,  No significant change was found  Confirmed by Christine Avalos MD ([id number]) on 8/9/2022 12:20:25 PM    Referred By: JOSHUA ROSALES           Confirmed By:Christine Avalos MD       CASTILLO  No results found for this or any previous visit.      TTE (8/9/22):  Interpretation Summary  · The left ventricle is normal in size with concentric remodeling and normal systolic function.   · The estimated ejection fraction is 58%.   · Normal left ventricular diastolic function.   · Severe left atrial enlargement.   · Moderate right ventricular enlargement with low normal right ventricular  systolic function.   · Moderate right atrial enlargement.   · Mild aortic regurgitation.   · The MR is at least moderate and possibly moderate -severe ( 2-3+)  · Moderate tricuspid regurgitation.    · Normal central venous pressure (3 mmHg).  · The estimated PA systolic pressure is 34 mmHg.    Cath (4/13/22):  · There was non-obstructive coronary artery disease.  · No significant CAD.        ASSESSMENT/PLAN:         Pre-op Assessment    I have reviewed the Patient Summary Reports.     I have reviewed the Nursing Notes. I have reviewed the NPO Status.   I have reviewed the Medications.     Review of Systems  Anesthesia Hx:  Denies Hx of Anesthetic complications   Denies Personal Hx of Anesthesia complications.   Social:  Former Smoker    Hematology/Oncology:  Hematology Normal   Oncology Normal     EENT/Dental:EENT/Dental Normal   Cardiovascular:   Hypertension Valvular problems/Murmurs    Pulmonary:  Pulmonary Normal    Renal/:  Renal/ Normal     Hepatic/GI:   GERD    Musculoskeletal:  Musculoskeletal Normal    Neurological:  Neurology Normal    Endocrine:  Endocrine Normal    Psych:   depression          Physical Exam  General: Well nourished, Cooperative and Alert    Airway:  Mouth Opening: Normal  TM Distance: Normal  Tongue: Normal  Neck ROM: Normal ROM        Anesthesia Plan  Type of Anesthesia, risks & benefits discussed:    Anesthesia Type: Gen ETT  Intra-op Monitoring Plan: Standard ASA Monitors, Art Line, Central Line, CASTILLO, PA and CO  Post Op Pain Control Plan: multimodal analgesia and IV/PO Opioids PRN  Induction:  IV  Airway Plan: Direct, Post-Induction  Informed Consent: Informed consent signed with the Patient and all parties understand the risks and agree with anesthesia plan.  All questions answered.   ASA Score: 4  Day of Surgery Review of History & Physical: H&P Update referred to the surgeon/provider.    Ready For Surgery From Anesthesia Perspective.     .

## 2022-08-10 NOTE — NURSING
Patient admitted to  [id number] s/p MVr/TVr/MAZE accompanied by Anesthesia team and Dr. Shannon.  Dr. Thayer notified of patient's arrival.  Patient remains intubated; connected to bedside ventilator.  Epi, Levo, Propofol, and Insulin infusing.  Patient connected to bedside cardiac monitor.  Epicardial V-wires in place; connected to external pacer & pacer set to a back up rate of 45bpm per Dr. Shannon.  500mL of Albumin to be administered now.  Will assume care of the patient at this time.

## 2022-08-10 NOTE — H&P
[facility] - [facility] Intensive [facility]  Critical Care - Surgery  History & Physical    Patient Name: Magalys Bass  MRN: [id number]  Admission Date: 8/10/2022  Code Status: Full Code  Attending Physician: Manish Morris MD   Primary Care Provider: Yonathan Brewer MD   Principal Problem: <principal problem not specified>    Subjective:     HPI:  61 y.o. female with chronic diastolic HF (EF 60%), pAF (on Eliquis), HTN, HLD, obesity s/p gastric sleeve (2017). Saw Dr. Matute who has concerns for restrictive disease / amyloid due to echo findings. Patient states that she was diagnosed with a heart murmur ~ 2 years ago. States that she is not really SOB when walking on a flat surface but more so with stairs. States that she used to walk for exercise but has not been doing that, so feels deconditioning also plays a part in her breathing issues. Intermittent swelling in lower extremities. Reports energy level is not what it used to be. Denies any dizziness, orthopnea, chest pain, palpitations. No prior strokes, seizures, blood clots, stents, or sternotomies. Quit smoking 30 years ago. No significant drinking history.      At last visit 2/2/22 with Dr. Huang patient was felt to be a good surgical candidate as both her mitral and tricuspid valves could be correct, as well as her afib. However, there was some concern that the patient may have cardiac amyloidosis and a final decision was pending further workup. The amyloid testing resulted negative. Here today to discuss next steps. Reports there has been no change in her symptoms.     She is now s/p elective MVR, TVR, MAZE procedure on 8/10/22. The procedure was performed without apparent complication and she was transferred to the SICU for postoperative care and management. Intraoperatively she received 5.5mL of crystalloid and 150 mL of Cell Saver back. Her reported urine output during the case was 1.1L. Her postoperative echocardiogram revealed normal biventricular  function with no notable mitral regurgitation. She arrives to the SICU intubated, sedated, and hemodynamically stable on 0.04 of epi, and 0.04 of levo.        Hospital/ICU Course:  No notes on file    Follow-up For: Procedure(s) (LRB):  VALVULOPLASTY,MITRAL VALVE (N/A)  REPAIR, TRICUSPID VALVE (N/A)  KIRKPATRICK MAZE PROCEDURE (N/A)    Post-Operative Day: Day of Surgery     Past Medical History:   Diagnosis Date    Anticoagulant long-term use     Depression     GERD (gastroesophageal reflux disease)     Hypertension     Medical history non-contributory     Paroxysmal A-fib        Past Surgical History:   Procedure Laterality Date    CORONARY ANGIOGRAPHY N/A 4/13/2022    Procedure: ANGIOGRAM, CORONARY ARTERY;  Surgeon: Isauro Perdomo MD;  Location: [facility] CATH LAB;  Service: Cardiology;  Laterality: N/A;    FINGER SURGERY Left     index fracture repair    GALLBLADDER SURGERY  08/2021    KNEE ARTHROSCOPY      SHOULDER ARTHROSCOPY      SHOULDER SURGERY         Review of patient's allergies indicates:   Allergen Reactions    Morphine Itching       Family History       Problem Relation (Age of Onset)    Stroke Mother          Tobacco Use    Smoking status: Former Smoker     Types: Cigarettes    Smokeless tobacco: Never Used   Substance and Sexual Activity    Alcohol use: No    Drug use: No    Sexual activity: Not Currently      Review of Systems   Unable to perform ROS: Intubated   Objective:     Vital Signs (Most Recent):  Pulse: (!) 54 (08/10/22 1609)  Resp: 20 (08/10/22 1609)  BP: 134/68 (08/10/22 0615)  SpO2: 100 % (08/10/22 1609)   Vital Signs (24h Range):  Pulse:  [50-54] 54  Resp:  [20] 20  SpO2:  [100 %] 100 %  BP: (134)/(68) 134/68        Body mass index is 47.93 kg/m².      Intake/Output Summary (Last 24 hours) at 8/10/2022 1623  Last data filed at 8/10/2022 1620  Gross per 24 hour   Intake 4700 ml   Output 1350 ml   Net 3350 ml       Physical Exam  Constitutional:       Comments: Intubated and  sedated   HENT:      Head: Normocephalic and atraumatic.   Eyes:      Pupils: Pupils are equal, round, and reactive to light.   Neck:      Comments: [facility] CVC  Cardiovascular:      Rate and Rhythm: Normal rate and regular rhythm.      Comments: Midline sternotomy c/d with dressing in place    2 mediastinal and 1 pleural chest tube to suction    V wires in place. Back up paced at 45  Abdominal:      General: There is no distension.      Palpations: Abdomen is soft.   Genitourinary:     Comments: Yin in place  Musculoskeletal:      Right lower leg: No edema.      Left lower leg: No edema.      Comments: L radial arterial line   Skin:     General: Skin is warm and dry.      Capillary Refill: Capillary refill takes less than 2 seconds.   Neurological:      Comments: Sedated       Vents:  Vent Mode: A/C (08/10/22 1609)  Ventilator Initiated: Yes (08/10/22 1600)  Set Rate: 20 BPM (08/10/22 1609)  Vt Set: 450 mL (08/10/22 1609)  PEEP/CPAP: 5 cmH20 (08/10/22 1609)  Oxygen Concentration (%): 100 (08/10/22 1609)  Peak Airway Pressure: 22 cmH2O (08/10/22 1609)  Plateau Pressure: 18 cmH20 (08/10/22 1609)  Total Ve: 9.97 mL (08/10/22 1609)  Negative Inspiratory Force (cm H2O): 0 (08/10/22 1609)  F/VT Ratio<105 (RSBI): (!) 40 (08/10/22 1609)    Lines/Drains/Airways       Central Venous Catheter Line  Duration              Introducer with Double Lumen 08/10/22 0848 <1 day    Introducer 08/10/22 0848 <1 day              Drain  Duration                  Chest Tube 08/10/22 1440 1 Right Mediastinal 19 Fr. <1 day         Chest Tube 08/10/22 1441 2 Left Mediastinal 19 Fr. <1 day         Chest Tube 08/10/22 1441 3 Right Pleural 19 Fr. <1 day         Urethral Catheter 08/10/22 0847 Straight-tip;Temperature probe;Non-latex 14 Fr. <1 day              Airway  Duration                  Airway - Non-Surgical 08/10/22 0835 <1 day              Arterial Line  Duration             Arterial Line 08/10/22 0843 Left Radial <1 day              Line   Duration                  Pacer Wires 08/10/22 1430 <1 day                    Significant Labs:    CBC/Anemia Profile:  Recent Labs   Lab 08/09/22  1143 08/10/22  1041 08/10/22  1333 08/10/22  1401 08/10/22  1608   WBC 2.54*  --   --   --   --    HGB 12.8  --   --   --   --    HCT 39.3   < > 27* 30* 33*     --   --   --   --    MCV 76*  --   --   --   --    RDW 14.0  --   --   --   --     < > = values in this interval not displayed.        Chemistries:  Recent Labs   Lab 08/09/22  1143      K 3.2*      CO2 29   BUN 11   CREATININE 1.0   CALCIUM 9.2   ALBUMIN 3.3*   PROT 6.9   BILITOT 0.7   ALKPHOS 132   ALT 18   AST 28       ABGs:   Recent Labs   Lab 08/10/22  1608   PH 7.377   PCO2 39.4   HCO3 23.2*   POCSATURATED 100   BE -2     Coagulation:   Recent Labs   Lab 08/10/22  1603   INR 1.3*   APTT 40.9*     All pertinent labs within the past 24 hours have been reviewed.    Significant Imaging: I have reviewed all pertinent imaging results/findings within the past 24 hours.    Assessment/Plan:     Nonrheumatic mitral valve regurgitation  61 y.o. female with chronic diastolic HF (EF 60%), pAF (on Eliquis), HTN, HLD, obesity s/p gastric sleeve (2017) now s/p MVR, TVR, and MAZE on 8/10/22.      Neuro/Psych:   -- Sedation: Propofol. Wean as tolerated with plans to extubate    -- Pain: PRN Oxy + Dilaudid  -- Scheduled Tylenol             Cards:   -- S/P MVR, TVR, MAZE on 8/10/22  -- HDS on 0.04 epi, 0.04 levo  -- Ventricular pacing wires. Backup paced at 45 BPM  -- MAP > 65, Syst < 140  -- Cleviprex PRN  -- Aspirin 325mg tonight pending postoperative coags and chest tube output      Pulm:   -- Goal O2 sat > 90%  -- ABG PRN  -- Wean vent as tolerated  -- Plan to extubate tonight if stable  -- Mediastinal chest tubes x2 and pleural chest tube x1 to suction      Renal:  -- Keep wang for strict I/O  -- Trend BUN/Cr       FEN / GI:   -- Replace lytes as needed  -- Nutrition: NPO  -- GI ppx:  famotidine  -- Bowel reg: miralax  -- 5% Albumin PRN      ID:   -- Tm: afebrile; WBC stable  -- Autumn-op ancef      Heme/Onc:   -- H/H stable   -- Daily CBC  -- 150mL Cell saver given back  -- Post-op coags pending  -- Aspirin 325mg QD      Endo:   -- BG goal 140-180  -- Insulin gtt      PPx:   Feeding: NPO  Analgesia/Sedation: Propofol / PRN Oxy + Dilaudid  Thromboembolic prevention: SCDs  HOB >30: Yes  Stress Ulcer ppx: famotidine  Glucose control: Critical care goal 140-180 g/dl, ISS     Lines/Drains/Airway: CVC RIJ, Yin, Chest tubes x 3, ventricular pacing wires, L radial A-line      Dispo/Code Status/Palliative:   -- SICU / Full Code.          Mindi Thayer MD  Critical Care - Surgery  [facility] - [facility]

## 2022-08-10 NOTE — ANESTHESIA PROCEDURE NOTES
Intubation    Date/Time: 8/10/2022 8:35 AM  Performed by: Tal Knight Jr., MD  Authorized by: Tal Knight Jr., MD     Intubation:     Induction:  Intravenous    Intubated:  Postinduction    Mask Ventilation:  Easy mask    Attempts:  1    Attempted By:  Staff anesthesiologist    Method of Intubation:  Direct    Blade:  Atwood 2    Laryngeal View Grade: Grade I - full view of cords      Difficult Airway Encountered?: No      Complications:  None    Airway Device Size:  7.5    Style/Cuff Inflation:  Cuffed    Tube secured:  22    Secured at:  The lips    Placement Verified By:  Capnometry    Complicating Factors:  None    Findings Post-Intubation:  BS equal bilateral

## 2022-08-10 NOTE — CONSULTS
[facility] - [facility]ensive [facility]  Endocrinology  Diabetes Consult Note    Consult Requested by: Manish Morris MD   Reason for admit: Nonrheumatic mitral valve regurgitation    HISTORY OF PRESENT ILLNESS:  Reason for Consult: Management of Hyperglycemia     Surgical Procedure and Date:   8/10/22  VALVULOPLASTY,MITRAL VALVE (N/A)  REPAIR, TRICUSPID VALVE (N/A)  KIRKPATRICK MAZE PROCEDURE (N/A)       HPI:   Patient is a 61 y.o. female with a diagnosis of HTN, pAF (on eliquis), GERD, obesity (BMI 48, s/p gastric sleeve 2017), former smoker, HFpEF (EF ~60%), moderate-to-severe MR, moderate tricuspid regurgitation (severe on prior ECHO 3/15/22), mild AR who now presents for the above procedure(s). Endocrinology consulted for management of hyperglycemia.       Lab Results   Component Value Date    HGBA1C 5.2 08/09/2022           Interval HPI:   Overnight events: Remains in SICU. NAEON. Intubated. BG within goal ranges on IV intensive insulin protocol. Diet NPO Except for: Sips with Medication  Eating:   NPO  Nausea: No  Hypoglycemia and intervention: No  Fever: No  TPN and/or TF: No  If yes, type of TF/TPN and rate: n/a    PMH, PSH, FH, SH reviewed     ROS:  Unable to obtain due to: Intubated    Review of Systems    Current Medications and/or Treatments Impacting Glycemic Control  Immunotherapy:    Immunosuppressants       None          Steroids:   Hormones (From admission, onward)                None          Pressors:    Autonomic Drugs (From admission, onward)                Start     Stop Route Frequency Ordered    08/10/22 1745  NORepinephrine 4 mg in dextrose 5% 250 mL infusion (premix) (titrating)        Question Answer Comment   Begin at (in mcg/kg/min): 0.04    Titrate by: (in mcg/kg/min) 0.02    Titrate interval: (in minutes) 5    Titrate to maintain: (MAP or SBP) MAP    Greater than: (in mmHg) 65    Maximum dose: (in mcg/kg/min) 16        -- IV Continuous 08/10/22 1632    08/10/22 1530  EPINEPHrine 5 mg in  dextrose 5% 250 mL infusion (premix)        Question Answer Comment   Begin at (in mcg/kg/min): 0.02    Titrate by: (in mcg/kg/min) 0.02    Titrate interval: (in minutes) 5    Titrate to maintain: (SBP or MAP or Cardiac Index) MAP    Greater than: (in mmHg) 65    Cardiac index greater than: (in L/min) 2.2    Maximum dose: (in mcg/kg/min) 2        -- IV Continuous 08/10/22 1523          Hyperglycemia/Diabetes Medications:   Antihyperglycemics (From admission, onward)                Start     Stop Route Frequency Ordered    08/10/22 1530  insulin regular in 0.9 % NaCl 100 unit/100 mL (1 unit/mL) infusion        Question Answer Comment   Insulin rate changes (DO NOT MODIFY ANSWER) \\[facility]\epic\Images\Pharmacy\InsulinInfusions\CTS INSULIN [id number]    Enter initial dose (Units/hr): 1        -- IV Continuous 08/10/22 1523             PHYSICAL EXAMINATION:  Vitals:    08/10/22 1609   BP:    Pulse: (!) 54   Resp: 20     Body mass index is 47.93 kg/m².    Physical Exam  Constitutional: Well developed, obese, NAD.  ENT: External ears no masses with nose patent  Neck: Supple; trachea midline  Cardiovascular: Normal heart sounds, no LE edema. DP +2 bilaterally.  Lungs: Normal effort; lungs anterior bilaterally clear to auscultation.  Intubated on a ventilator.  Abdomen: Soft, no masses, no hernias.  Hypoactive BS noted.  MS: No clubbing or cyanosis of nails noted; unable to assess gait.  Skin: No rashes, lesions, or ulcers; no nodules.  Mid-sternal incision with telfa island dressing, CDI.  CT x 2.  LLE wrapped with ACE wrap.  Psychiatric: MARIA ESTHER  Neurological: MARIA ESTHER  Foot: Nails in good condition, no amputations noted        Labs Reviewed and Include   Recent Labs   Lab 08/10/22  1603   *   ALBUMIN 1.9*   PROT 4.3*      K 2.8*   CO2 18*   *   BUN 10   CREATININE 0.9   ALKPHOS 108   ALT 96*   *   BILITOT 1.1*     Lab Results   Component Value Date    WBC 2.54 (L) 08/09/2022    HGB 10.5 (L)  08/10/2022    HCT 33 (L) 08/10/2022    MCV 76 (L) 08/10/2022     08/09/2022     No results for input(s): TSH, FREET4 in the last 168 hours.  Lab Results   Component Value Date    HGBA1C 5.2 08/09/2022       Nutritional status:   Body mass index is 47.93 kg/m².  Lab Results   Component Value Date    ALBUMIN 1.9 (L) 08/10/2022    ALBUMIN 3.3 (L) 08/09/2022    ALBUMIN 2.9 (L) 04/11/2022     No results found for: PREALBUMIN    CrCl cannot be calculated (Unknown ideal weight.).    Accu-Checks  Recent Labs     08/10/22  1602   POCTGLUCOSE 137*        ASSESSMENT and PLAN    * Nonrheumatic mitral valve regurgitation  Managed per primary team  Optimize BG control        Stress hyperglycemia  BG goal 110-140 (CTS protocol)     Continue IV insulin infusion protocol  Requires intensive BG monitoring while on protocol     ** Please call Endocrine for any BG related issues **    ** Please notify Endocrine for any change and/or advance in diet**    Discharge planning: TBD        Morbid obesity  Body mass index is 47.93 kg/m².  May increase insulin resistance.         Paroxysmal atrial fibrillation  May increase insulin resistance.         S/P mitral valve repair    Managed per primary team  Optimize BG control          Plan discussed with RN at bedside.     Violet Brown NP  Endocrinology  [facility] - Surgical Intensive Care

## 2022-08-10 NOTE — CARE UPDATE
PT 8.0 ett pulled back from 23cm at the teeth to 21cm at the teeth without incident. Vitals stable. WCTM

## 2022-08-11 LAB
ALBUMIN SERPL BCP-MCNC: 3.5 G/DL (ref 3.5–5.2)
ALLENS TEST: ABNORMAL
ALP SERPL-CCNC: 95 U/L (ref 55–135)
ALT SERPL W/O P-5'-P-CCNC: 65 U/L (ref 10–44)
ANION GAP SERPL CALC-SCNC: 10 MMOL/L (ref 8–16)
APTT BLDCRRT: 25 SEC (ref 21–32)
AST SERPL-CCNC: 231 U/L (ref 10–40)
BASOPHILS # BLD AUTO: 0.01 K/UL (ref 0–0.2)
BASOPHILS NFR BLD: 0.1 % (ref 0–1.9)
BILIRUB SERPL-MCNC: 0.9 MG/DL (ref 0.1–1)
BUN SERPL-MCNC: 10 MG/DL (ref 8–23)
CALCIUM SERPL-MCNC: 8.3 MG/DL (ref 8.7–10.5)
CHLORIDE SERPL-SCNC: 113 MMOL/L (ref 95–110)
CO2 SERPL-SCNC: 18 MMOL/L (ref 23–29)
CREAT SERPL-MCNC: 0.9 MG/DL (ref 0.5–1.4)
DELSYS: ABNORMAL
DIFFERENTIAL METHOD: ABNORMAL
EOSINOPHIL # BLD AUTO: 0 K/UL (ref 0–0.5)
EOSINOPHIL NFR BLD: 0 % (ref 0–8)
ERYTHROCYTE [DISTWIDTH] IN BLOOD BY AUTOMATED COUNT: 14.1 % (ref 11.5–14.5)
ERYTHROCYTE [SEDIMENTATION RATE] IN BLOOD BY WESTERGREN METHOD: 22 MM/H
EST. GFR  (NO RACE VARIABLE): >60 ML/MIN/1.73 M^2
FIO2: 30
GLUCOSE SERPL-MCNC: 125 MG/DL (ref 70–110)
HCO3 UR-SCNC: 16.3 MMOL/L (ref 24–28)
HCO3 UR-SCNC: 17.9 MMOL/L (ref 24–28)
HCO3 UR-SCNC: 20.9 MMOL/L (ref 24–28)
HCT VFR BLD AUTO: 29.1 % (ref 37–48.5)
HCT VFR BLD CALC: 27 %PCV (ref 36–54)
HCT VFR BLD CALC: 30 %PCV (ref 36–54)
HCT VFR BLD CALC: 31 %PCV (ref 36–54)
HGB BLD-MCNC: 9.4 G/DL (ref 12–16)
IMM GRANULOCYTES # BLD AUTO: 0.02 K/UL (ref 0–0.04)
IMM GRANULOCYTES NFR BLD AUTO: 0.3 % (ref 0–0.5)
INR PPP: 1.2 (ref 0.8–1.2)
LDH SERPL L TO P-CCNC: 1.58 MMOL/L (ref 0.36–1.25)
LDH SERPL L TO P-CCNC: 2.72 MMOL/L (ref 0.36–1.25)
LDH SERPL L TO P-CCNC: 3.83 MMOL/L (ref 0.36–1.25)
LYMPHOCYTES # BLD AUTO: 1 K/UL (ref 1–4.8)
LYMPHOCYTES NFR BLD: 13.5 % (ref 18–48)
MAGNESIUM SERPL-MCNC: 2.3 MG/DL (ref 1.6–2.6)
MCH RBC QN AUTO: 24.1 PG (ref 27–31)
MCHC RBC AUTO-ENTMCNC: 32.3 G/DL (ref 32–36)
MCV RBC AUTO: 75 FL (ref 82–98)
MODE: ABNORMAL
MONOCYTES # BLD AUTO: 0.9 K/UL (ref 0.3–1)
MONOCYTES NFR BLD: 11.3 % (ref 4–15)
NEUTROPHILS # BLD AUTO: 5.8 K/UL (ref 1.8–7.7)
NEUTROPHILS NFR BLD: 74.8 % (ref 38–73)
NRBC BLD-RTO: 0 /100 WBC
PCO2 BLDA: 25.9 MMHG (ref 35–45)
PCO2 BLDA: 26.3 MMHG (ref 35–45)
PCO2 BLDA: 35 MMHG (ref 35–45)
PEEP: 5
PH SMN: 7.38 [PH] (ref 7.35–7.45)
PH SMN: 7.4 [PH] (ref 7.35–7.45)
PH SMN: 7.45 [PH] (ref 7.35–7.45)
PHOSPHATE SERPL-MCNC: 3.2 MG/DL (ref 2.7–4.5)
PLATELET # BLD AUTO: 86 K/UL (ref 150–450)
PMV BLD AUTO: 12.3 FL (ref 9.2–12.9)
PO2 BLDA: 111 MMHG (ref 80–100)
PO2 BLDA: 132 MMHG (ref 80–100)
PO2 BLDA: 150 MMHG (ref 80–100)
POC BE: -4 MMOL/L
POC BE: -6 MMOL/L
POC BE: -8 MMOL/L
POC IONIZED CALCIUM: 1.08 MMOL/L (ref 1.06–1.42)
POC IONIZED CALCIUM: 1.08 MMOL/L (ref 1.06–1.42)
POC IONIZED CALCIUM: 1.19 MMOL/L (ref 1.06–1.42)
POC SATURATED O2: 98 % (ref 95–100)
POC SATURATED O2: 99 % (ref 95–100)
POC SATURATED O2: 99 % (ref 95–100)
POC TCO2: 17 MMOL/L (ref 23–27)
POC TCO2: 19 MMOL/L (ref 23–27)
POC TCO2: 22 MMOL/L (ref 23–27)
POCT GLUCOSE: 101 MG/DL (ref 70–110)
POCT GLUCOSE: 101 MG/DL (ref 70–110)
POCT GLUCOSE: 107 MG/DL (ref 70–110)
POCT GLUCOSE: 111 MG/DL (ref 70–110)
POCT GLUCOSE: 111 MG/DL (ref 70–110)
POCT GLUCOSE: 112 MG/DL (ref 70–110)
POCT GLUCOSE: 115 MG/DL (ref 70–110)
POCT GLUCOSE: 116 MG/DL (ref 70–110)
POCT GLUCOSE: 117 MG/DL (ref 70–110)
POCT GLUCOSE: 125 MG/DL (ref 70–110)
POCT GLUCOSE: 127 MG/DL (ref 70–110)
POCT GLUCOSE: 132 MG/DL (ref 70–110)
POCT GLUCOSE: 142 MG/DL (ref 70–110)
POCT GLUCOSE: 178 MG/DL (ref 70–110)
POCT GLUCOSE: 95 MG/DL (ref 70–110)
POCT GLUCOSE: 98 MG/DL (ref 70–110)
POTASSIUM BLD-SCNC: 3.2 MMOL/L (ref 3.5–5.1)
POTASSIUM BLD-SCNC: 3.9 MMOL/L (ref 3.5–5.1)
POTASSIUM BLD-SCNC: 4.8 MMOL/L (ref 3.5–5.1)
POTASSIUM SERPL-SCNC: 3.8 MMOL/L (ref 3.5–5.1)
POTASSIUM SERPL-SCNC: 4.4 MMOL/L (ref 3.5–5.1)
POTASSIUM SERPL-SCNC: 4.6 MMOL/L (ref 3.5–5.1)
PROT SERPL-MCNC: 5.3 G/DL (ref 6–8.4)
PROTHROMBIN TIME: 11.9 SEC (ref 9–12.5)
PS: 10
PS: 10
RBC # BLD AUTO: 3.9 M/UL (ref 4–5.4)
SAMPLE: ABNORMAL
SITE: ABNORMAL
SODIUM BLD-SCNC: 143 MMOL/L (ref 136–145)
SODIUM BLD-SCNC: 146 MMOL/L (ref 136–145)
SODIUM BLD-SCNC: 146 MMOL/L (ref 136–145)
SODIUM SERPL-SCNC: 141 MMOL/L (ref 136–145)
VT: 450
WBC # BLD AUTO: 7.71 K/UL (ref 3.9–12.7)

## 2022-08-11 PROCEDURE — P9045 ALBUMIN (HUMAN), 5%, 250 ML: HCPCS | Mod: JG

## 2022-08-11 PROCEDURE — 94799 UNLISTED PULMONARY SVC/PX: CPT

## 2022-08-11 PROCEDURE — 84100 ASSAY OF PHOSPHORUS: CPT | Performed by: PHYSICIAN ASSISTANT

## 2022-08-11 PROCEDURE — 94761 N-INVAS EAR/PLS OXIMETRY MLT: CPT

## 2022-08-11 PROCEDURE — 20000000 HC ICU ROOM

## 2022-08-11 PROCEDURE — 97535 SELF CARE MNGMENT TRAINING: CPT

## 2022-08-11 PROCEDURE — 99499 NO LOS: ICD-10-PCS | Mod: ,,, | Performed by: ANESTHESIOLOGY

## 2022-08-11 PROCEDURE — 94640 AIRWAY INHALATION TREATMENT: CPT

## 2022-08-11 PROCEDURE — 99499 UNLISTED E&M SERVICE: CPT | Mod: ,,, | Performed by: ANESTHESIOLOGY

## 2022-08-11 PROCEDURE — 25000242 PHARM REV CODE 250 ALT 637 W/ HCPCS: Performed by: PHYSICIAN ASSISTANT

## 2022-08-11 PROCEDURE — 84295 ASSAY OF SERUM SODIUM: CPT

## 2022-08-11 PROCEDURE — 99232 SBSQ HOSP IP/OBS MODERATE 35: CPT | Mod: ,,, | Performed by: NURSE PRACTITIONER

## 2022-08-11 PROCEDURE — 82330 ASSAY OF CALCIUM: CPT

## 2022-08-11 PROCEDURE — 82803 BLOOD GASES ANY COMBINATION: CPT

## 2022-08-11 PROCEDURE — 97530 THERAPEUTIC ACTIVITIES: CPT

## 2022-08-11 PROCEDURE — 97163 PT EVAL HIGH COMPLEX 45 MIN: CPT

## 2022-08-11 PROCEDURE — 84132 ASSAY OF SERUM POTASSIUM: CPT | Mod: 91 | Performed by: PHYSICIAN ASSISTANT

## 2022-08-11 PROCEDURE — 37799 UNLISTED PX VASCULAR SURGERY: CPT

## 2022-08-11 PROCEDURE — 85610 PROTHROMBIN TIME: CPT | Performed by: PHYSICIAN ASSISTANT

## 2022-08-11 PROCEDURE — 63600175 PHARM REV CODE 636 W HCPCS: Mod: JG

## 2022-08-11 PROCEDURE — 83605 ASSAY OF LACTIC ACID: CPT

## 2022-08-11 PROCEDURE — 99900035 HC TECH TIME PER 15 MIN (STAT)

## 2022-08-11 PROCEDURE — 25000003 PHARM REV CODE 250

## 2022-08-11 PROCEDURE — 94003 VENT MGMT INPAT SUBQ DAY: CPT

## 2022-08-11 PROCEDURE — 80053 COMPREHEN METABOLIC PANEL: CPT | Performed by: STUDENT IN AN ORGANIZED HEALTH CARE EDUCATION/TRAINING PROGRAM

## 2022-08-11 PROCEDURE — 63600175 PHARM REV CODE 636 W HCPCS: Performed by: PHYSICIAN ASSISTANT

## 2022-08-11 PROCEDURE — 97110 THERAPEUTIC EXERCISES: CPT

## 2022-08-11 PROCEDURE — 27000221 HC OXYGEN, UP TO 24 HOURS

## 2022-08-11 PROCEDURE — 85730 THROMBOPLASTIN TIME PARTIAL: CPT | Performed by: PHYSICIAN ASSISTANT

## 2022-08-11 PROCEDURE — 25000003 PHARM REV CODE 250: Performed by: PHYSICIAN ASSISTANT

## 2022-08-11 PROCEDURE — 63600175 PHARM REV CODE 636 W HCPCS

## 2022-08-11 PROCEDURE — 84132 ASSAY OF SERUM POTASSIUM: CPT

## 2022-08-11 PROCEDURE — 99900017 HC EXTUBATION W/PARAMETERS (STAT)

## 2022-08-11 PROCEDURE — 25000003 PHARM REV CODE 250: Performed by: STUDENT IN AN ORGANIZED HEALTH CARE EDUCATION/TRAINING PROGRAM

## 2022-08-11 PROCEDURE — 83735 ASSAY OF MAGNESIUM: CPT | Performed by: PHYSICIAN ASSISTANT

## 2022-08-11 PROCEDURE — 99232 PR SUBSEQUENT HOSPITAL CARE,LEVL II: ICD-10-PCS | Mod: ,,, | Performed by: NURSE PRACTITIONER

## 2022-08-11 PROCEDURE — 85025 COMPLETE CBC W/AUTO DIFF WBC: CPT | Performed by: PHYSICIAN ASSISTANT

## 2022-08-11 PROCEDURE — 97165 OT EVAL LOW COMPLEX 30 MIN: CPT

## 2022-08-11 PROCEDURE — 94150 VITAL CAPACITY TEST: CPT

## 2022-08-11 PROCEDURE — 85014 HEMATOCRIT: CPT

## 2022-08-11 RX ORDER — IBUPROFEN 200 MG
24 TABLET ORAL
Status: DISCONTINUED | OUTPATIENT
Start: 2022-08-11 | End: 2022-08-13

## 2022-08-11 RX ORDER — INSULIN ASPART 100 [IU]/ML
1-10 INJECTION, SOLUTION INTRAVENOUS; SUBCUTANEOUS
Status: DISCONTINUED | OUTPATIENT
Start: 2022-08-11 | End: 2022-08-13

## 2022-08-11 RX ORDER — GLUCAGON 1 MG
1 KIT INJECTION
Status: DISCONTINUED | OUTPATIENT
Start: 2022-08-11 | End: 2022-08-13

## 2022-08-11 RX ORDER — ALBUMIN HUMAN 50 G/1000ML
25 SOLUTION INTRAVENOUS ONCE
Status: COMPLETED | OUTPATIENT
Start: 2022-08-11 | End: 2022-08-11

## 2022-08-11 RX ORDER — ACETAMINOPHEN 650 MG/20.3ML
650 LIQUID ORAL EVERY 8 HOURS
Status: DISCONTINUED | OUTPATIENT
Start: 2022-08-11 | End: 2022-08-12

## 2022-08-11 RX ORDER — FUROSEMIDE 10 MG/ML
40 INJECTION INTRAMUSCULAR; INTRAVENOUS 2 TIMES DAILY
Status: DISCONTINUED | OUTPATIENT
Start: 2022-08-11 | End: 2022-08-13

## 2022-08-11 RX ORDER — IBUPROFEN 200 MG
16 TABLET ORAL
Status: DISCONTINUED | OUTPATIENT
Start: 2022-08-11 | End: 2022-08-13

## 2022-08-11 RX ADMIN — ACETAMINOPHEN 650 MG: 160 SOLUTION ORAL at 02:08

## 2022-08-11 RX ADMIN — DEXTROSE 2 G: 50 INJECTION, SOLUTION INTRAVENOUS at 09:08

## 2022-08-11 RX ADMIN — HYDROMORPHONE HYDROCHLORIDE 1 MG: 1 INJECTION, SOLUTION INTRAMUSCULAR; INTRAVENOUS; SUBCUTANEOUS at 11:08

## 2022-08-11 RX ADMIN — ASPIRIN 325 MG ORAL TABLET 325 MG: 325 PILL ORAL at 10:08

## 2022-08-11 RX ADMIN — HYDROMORPHONE HYDROCHLORIDE 1 MG: 1 INJECTION, SOLUTION INTRAMUSCULAR; INTRAVENOUS; SUBCUTANEOUS at 09:08

## 2022-08-11 RX ADMIN — IPRATROPIUM BROMIDE AND ALBUTEROL SULFATE 3 ML: 2.5; .5 SOLUTION RESPIRATORY (INHALATION) at 08:08

## 2022-08-11 RX ADMIN — MUPIROCIN 1 G: 20 OINTMENT TOPICAL at 09:08

## 2022-08-11 RX ADMIN — FAMOTIDINE 20 MG: 10 INJECTION, SOLUTION INTRAVENOUS at 09:08

## 2022-08-11 RX ADMIN — DEXTROSE 2 G: 50 INJECTION, SOLUTION INTRAVENOUS at 03:08

## 2022-08-11 RX ADMIN — FUROSEMIDE 40 MG: 10 INJECTION, SOLUTION INTRAMUSCULAR; INTRAVENOUS at 09:08

## 2022-08-11 RX ADMIN — DEXTROSE 2 G: 50 INJECTION, SOLUTION INTRAVENOUS at 02:08

## 2022-08-11 RX ADMIN — IPRATROPIUM BROMIDE AND ALBUTEROL SULFATE 3 ML: 2.5; .5 SOLUTION RESPIRATORY (INHALATION) at 12:08

## 2022-08-11 RX ADMIN — ALBUMIN (HUMAN) 25 G: 12.5 SOLUTION INTRAVENOUS at 12:08

## 2022-08-11 RX ADMIN — POLYETHYLENE GLYCOL 3350 17 G: 17 POWDER, FOR SOLUTION ORAL at 10:08

## 2022-08-11 RX ADMIN — DEXTROSE 2 G: 50 INJECTION, SOLUTION INTRAVENOUS at 10:08

## 2022-08-11 RX ADMIN — DOCUSATE SODIUM 100 MG: 100 CAPSULE ORAL at 10:08

## 2022-08-11 RX ADMIN — IPRATROPIUM BROMIDE AND ALBUTEROL SULFATE 3 ML: 2.5; .5 SOLUTION RESPIRATORY (INHALATION) at 03:08

## 2022-08-11 RX ADMIN — DOCUSATE SODIUM 100 MG: 100 CAPSULE ORAL at 09:08

## 2022-08-11 RX ADMIN — ACETAMINOPHEN 650 MG: 160 SOLUTION ORAL at 09:08

## 2022-08-11 NOTE — PLAN OF CARE
"SICU PLAN OF CARE NOTE    Dx: Nonrheumatic mitral valve regurgitation    Goals of Care:  SYS <140; MAP 60-80; Ray vent to extubation    Vital Signs:  BP (!) 151/72   Pulse (!) 45   Temp 98.24 °F (36.8 °C)   Resp 14   Ht 5' 5" (1.651 m)   Wt (!) 139.7 kg (307 lb 15.7 oz)   LMP 08/01/2014   SpO2 100%   Breastfeeding No   BMI 51.25 kg/m²     Cardiac:  NSR/SB; Ventricular wires connected to pacemaker with Rate increase to 80 bpm per Dr. LISA Thayer. 100% V-paced at this time.     Resp:  Ventilator AC/VC 14/450/30%/5. Working to extubate patient. Initial SBT this AM without success as patient was not consistently pulling volumes. Reattempted SBT 0430,  Extubated to 3L NC @ 0600. Tolerated well. VSS.    Neuro:  Arouses to Voice, Follows Commands, and Moves All Extremities    Gtts:  Insulin    Urine Output:  Urinary Catheter 695 cc/shift    Drains:  Chest Tube, total output 329 cc /  shift   OGT to low intermittent suction with 0 output/shift    Bilateral Wrist Restraints Discontinued following extubation. Safety maintained. No injuries noted.    Diet:  NPO     Labs/Accuchecks:  Q1 hr Blood glucose monitoring. Labs reviewed. K replaced throughout shift.     Skin:  All skin remains free from new injury.  Patient turned Q2, waffle mattress inflated, ICU bed working correctly, buttock foam present. Binding bra on.     Shift Events:  Patient given 1000 cc total of albumin this shift. Titrated off epi to maintain MAP 60-80. Propofol titrated off and precedex started. Precedex then titrated to off per patient tolerance related to drowsyness this AM. SB this shift. Bradycardic 44-45 midshift. HR improved with titration off Precedex. Ventricular wires remain in place with rate increase to 80 bpm per Dr. LISA Thayer. Pacemaker functioning and paced patient with HR fell below backup rate. Patient awakens and follows commands. Incision intact. Insulin titrated per nomogram.  See flowsheet for further assessment/details.  Family " (Daughter at bedside) updated on current condition/plan of care, questions answered, and emotional support provided.   MD updated on current condition, vitals, labs, and gtts.

## 2022-08-11 NOTE — PT/OT/SLP EVAL
Physical Therapy Co-Evaluation  Co-evaluation and treatment performed due to acuity and complexity of pt's medical status with 2 skilled disciplines needed to optimize pts functional performance in ICU setting.    Patient Name:  Magalys Bass   MRN:  [id number]    Recommendations:     Discharge Recommendations:  home health PT   Discharge Equipment Recommendations: bath bench   Barriers to discharge: None    Assessment:     Magalys Bass is a 61 y.o. female admitted with a medical diagnosis of Nonrheumatic mitral valve regurgitation.  She presents with the following impairments/functional limitations:  weakness, impaired endurance, impaired self care skills, impaired functional mobility, gait instability, impaired balance, decreased coordination, decreased upper extremity function, decreased lower extremity function, decreased safety awareness.   pt tolerated session well. Pt performed bed mobility with max A and STS and side steps with Campos.     Rehab Prognosis: Good; patient would benefit from acute skilled PT services to address these deficits and reach maximum level of function.    Recent Surgery: Procedure(s) (LRB):  VALVULOPLASTY,MITRAL VALVE (N/A)  REPAIR, TRICUSPID VALVE (N/A)  KIRKPATRICK MAZE PROCEDURE (N/A) 1 Day Post-Op    Plan:     During this hospitalization, patient to be seen 5 x/week to address the identified rehab impairments via gait training, therapeutic activities, therapeutic exercises, neuromuscular re-education and progress toward the following goals:    · Plan of Care Expires:  09/10/22    Subjective     Chief Complaint: discomfort in chest  Patient/Family Comments/goals: return to PLOF  Pain/Comfort:  · Pain Rating 1: 0/10  · Location - Orientation 1: lower  · Location 1: chest  · Pain Addressed 1: Reposition, Distraction, Cessation of Activity, Nurse notified  · Pain Rating Post-Intervention 1:  (not rated)    Patients cultural, spiritual, Restorationist conflicts given the current situation:  no    Living Environment:  Living Environment: Pt lives with her  in a SSH with 6 steps/RHR and a T/S setup.  Previous level of function: (I) with ADLs / uses a SPC PRN; drives.  Equipment Used at Home:  cane, straight  Assistance upon Discharge: family    Objective:     Communicated with RN prior to session.  Patient found HOB elevated with arterial line, central line, blood pressure cuff, chest tube, wang catheter, external pacer, oxygen, pulse ox (continuous), telemetry  upon PT entry to room.    General Precautions: Standard, fall, sternal   Orthopedic Precautions:N/A   Braces: N/A    Exams:  · Gross Motor Coordination:  WFL  · Postural Exam:  Patient presented with the following abnormalities:    · -       Rounded shoulders  · -       Forward head  · Sensation:  Denies numbness/tingling, endorses normal LT  · RLE ROM: WFL  · RLE Strength: grossly 4 to 5/5  · LLE ROM: WFL  · LLE Strength: grossly 4 to 5/5    Functional Mobility:  · Bed Mobility:     · Scooting: maximal assistance  · Supine to Sit: maximal assistance  · Sit to Supine: maximal assistance  · Transfers:     · Sit to Stand:  minimum assistance with no AD and hand-held assist  · Gait: 3 to 4 steps left along EOB, Campos, no AD, HHA; decreased gait speed, decreased step length  · Balance:   · Static Sitting: SBA  · Dynamic Sitting: CGA  · Static Standing: CGA-Campos  · Dynamic Standing: Campos  · Campos with B HHA for marching in place    Therapeutic Activities and Exercises:  Patient educated on sternal precautions  Patient educated on role of therapy, goals of session, and benefits of mobilizing.   Discussed PT plan of care during hospitalization.   Patient educated on calling for assistance.   Patient educated on how their diagnosis impacts their mobility within PT scope of practice.   Communication board up to date.  All questions answered within PT scope of practice.    AM-PAC 6 CLICK MOBILITY  Total Score:12     Patient left HOB elevated with all  lines intact, call button in reach, RN notified and pt's daughter present.    GOALS:   Multidisciplinary Problems     Physical Therapy Goals        Problem: Physical Therapy    Goal Priority Disciplines Outcome Goal Variances Interventions   Physical Therapy Goal     PT, PT/OT Ongoing, Progressing     Description: Goals to be met by: 2022     Patient will increase functional independence with mobility by performin. Supine to sit with Contact Guard Assistance  2. Sit to supine with Contact Guard Assistance  3. Rolling to Left and Right with Contact Guard Assistance.  4. Sit to stand transfer with Contact Guard Assistance  5. Bed to chair transfer with Contact Guard Assistance using No Assistive Device  6. Gait  x 200 feet with Contact Guard Assistance using No Assistive Device.   7. Ascend/descend 6 stair with right Handrails Minimal Assistance using No Assistive Device.                      History:     Past Medical History:   Diagnosis Date    Anticoagulant long-term use     Depression     GERD (gastroesophageal reflux disease)     Hypertension     Medical history non-contributory     Paroxysmal A-fib        Past Surgical History:   Procedure Laterality Date    CORONARY ANGIOGRAPHY N/A 2022    Procedure: ANGIOGRAM, CORONARY ARTERY;  Surgeon: Isauro Perdomo MD;  Location: [facility] CATH LAB;  Service: Cardiology;  Laterality: N/A;    KIRKPATRICK MAZE PROCEDURE N/A 8/10/2022    Procedure: KIRKPATRICK MAZE PROCEDURE;  Surgeon: Manish Morris MD;  Location: [facility] OR [address] [place] [address];  Service: Cardiothoracic;  Laterality: N/A;  Maze    FINGER SURGERY Left     index fracture repair    GALLBLADDER SURGERY  2021    KNEE ARTHROSCOPY      SHOULDER ARTHROSCOPY      SHOULDER SURGERY      TRICUSPID VALVULOPLASTY N/A 8/10/2022    Procedure: REPAIR, TRICUSPID VALVE;  Surgeon: Manish Morris MD;  Location: [facility] OR [address] [place] [address];  Service: Cardiothoracic;  Laterality: N/A;       Time Tracking:     PT Received On:  08/11/22  PT Start Time: 1357     PT Stop Time: 1430  PT Total Time (min): 33 min     Billable Minutes: Evaluation 10, Therapeutic Activity 10 and Therapeutic Exercise 13      08/11/2022

## 2022-08-11 NOTE — PROGRESS NOTES
[facility]  Critical Care - Surgery  Progress Note    Patient Name: Magalys Bass  MRN: [id number]  Admission Date: 8/10/2022  Hospital Length of Stay: 1 days  Code Status: Full Code  Attending Provider: Manish Morris MD  Primary Care Provider: Yonathan Brewer MD   Principal Problem: Nonrheumatic mitral valve regurgitation    Subjective:     Hospital/ICU Course:  No notes on file    Interval History/Significant Events: No acute events overnight. Hemodynamically stable without pressor or inotropic support. Paced at 80 due to bradycardic intrinsic rate. Extubated this morning without difficulty.    Follow-up For: Procedure(s) (LRB):  VALVULOPLASTY,MITRAL VALVE (N/A)  REPAIR, TRICUSPID VALVE (N/A)  KIRKPATRICK MAZE PROCEDURE (N/A)    Post-Operative Day: 1 Day Post-Op    Objective:     Vital Signs (Most Recent):  Temp: 100.22 °F (37.9 °C) (08/11/22 0645)  Pulse: 79 (08/11/22 0827)  Resp: 18 (08/11/22 0827)  BP: (!) 151/72 (08/10/22 1945)  SpO2: 100 % (08/11/22 0827)   Vital Signs (24h Range):  Temp:  [95.54 °F (35.3 °C)-100.22 °F (37.9 °C)] 100.22 °F (37.9 °C)  Pulse:  [45-86] 79  Resp:  [2-25] 18  SpO2:  [94 %-100 %] 100 %  BP: (151)/(72) 151/72  Arterial Line BP: ()/(41-72) 96/52     Weight: (!) 139.7 kg (307 lb 15.7 oz)  Body mass index is 51.25 kg/m².      Intake/Output Summary (Last 24 hours) at 8/11/2022 0834  Last data filed at 8/11/2022 0600  Gross per 24 hour   Intake 7087.31 ml   Output 2659 ml   Net 4428.31 ml       Physical Exam  Constitutional:       General: She is not in acute distress.  HENT:      Head: Normocephalic and atraumatic.   Eyes:      Pupils: Pupils are equal, round, and reactive to light.   Neck:      Comments: [facility] CVC  Cardiovascular:      Rate and Rhythm: Normal rate and regular rhythm.      Comments: Midline sternotomy c/d with dressing in place    2 mediastinal and 1 pleural chest tube to suction    V wires in place. Paced at 80  Pulmonary:      Effort:  Pulmonary effort is normal. No respiratory distress.   Abdominal:      General: There is no distension.      Palpations: Abdomen is soft.   Genitourinary:     Comments: Yin in place  Musculoskeletal:      Right lower leg: No edema.      Left lower leg: No edema.      Comments: L radial arterial line   Skin:     General: Skin is warm and dry.      Capillary Refill: Capillary refill takes less than 2 seconds.   Neurological:      General: No focal deficit present.      Mental Status: She is alert and oriented to person, place, and time.       Vents:  Vent Mode: Spont (08/11/22 0557)  Ventilator Initiated: Yes (08/10/22 1600)  Set Rate: 14 BPM (08/11/22 0335)  Vt Set: 450 mL (08/11/22 0335)  Pressure Support: 10 cmH20 (08/11/22 0557)  PEEP/CPAP: 5 cmH20 (08/11/22 0557)  Oxygen Concentration (%): 30 (08/11/22 0600)  Peak Airway Pressure: 16 cmH2O (08/11/22 0557)  Plateau Pressure: 18 cmH20 (08/11/22 0557)  Total Ve: 9.9 mL (08/11/22 0557)  Negative Inspiratory Force (cm H2O): -37 (08/11/22 0557)  F/VT Ratio<105 (RSBI): (!) 21.65 (08/11/22 0557)    Lines/Drains/Airways       Central Venous Catheter Line  Duration              Introducer with Double Lumen 08/10/22 0848 <1 day    Introducer 08/10/22 0848 <1 day              Drain  Duration                  Chest Tube 08/10/22 1440 1 Right Mediastinal 19 Fr. <1 day         Chest Tube 08/10/22 1441 2 Left Mediastinal 19 Fr. <1 day         Chest Tube 08/10/22 1441 3 Right Pleural 19 Fr. <1 day         Urethral Catheter 08/10/22 0847 Straight-tip;Temperature probe;Non-latex 14 Fr. <1 day              Arterial Line  Duration             Arterial Line 08/10/22 0843 Left Radial <1 day              Line  Duration                  Pacer Wires 08/10/22 1430 <1 day                    Significant Labs:    CBC/Anemia Profile:  Recent Labs   Lab 08/09/22  1143 08/10/22  0927 08/10/22  1603 08/10/22  1608 08/10/22  2354 08/11/22  0143 08/11/22  0432   WBC 2.54*  --  17.10*  --   --    --  7.71   HGB 12.8  --  10.5*  --   --   --  9.4*   HCT 39.3   < > 32.0*   < > 30* 27* 29.1*     --  105*  --   --   --  86*   MCV 76*  --  76*  --   --   --  75*   RDW 14.0  --  14.4  --   --   --  14.1    < > = values in this interval not displayed.        Chemistries:  Recent Labs   Lab 08/09/22  1143 08/10/22  1603 08/10/22  2007 08/11/22  0432    142  --  141   K 3.2* 2.8* 3.3* 4.6    112*  --  113*   CO2 29 18*  --  18*   BUN 11 10  --  10   CREATININE 1.0 0.9  --  0.9   CALCIUM 9.2 6.9*  --  8.3*   ALBUMIN 3.3* 1.9*  --  3.5   PROT 6.9 4.3*  --  5.3*   BILITOT 0.7 1.1*  --  0.9   ALKPHOS 132 108  --  95   ALT 18 96*  --  65*   AST 28 336*  --  231*   MG  --  2.3  2.3  --  2.3   PHOS  --  2.9  2.9  --  3.2       ABGs:   Recent Labs   Lab 08/11/22  0143   PH 7.448   PCO2 25.9*   HCO3 17.9*   POCSATURATED 99   BE -6     Coagulation:   Recent Labs   Lab 08/11/22  0432   INR 1.2   APTT 25.0     All pertinent labs within the past 24 hours have been reviewed.    Significant Imaging:  I have reviewed all pertinent imaging results/findings within the past 24 hours.    Assessment/Plan:     * Nonrheumatic mitral valve regurgitation  61 y.o. female with chronic diastolic HF (EF 60%), pAF (on Eliquis), HTN, HLD, obesity s/p gastric sleeve (2017) now s/p MVR, TVR, and MAZE on 8/10/22.      Neuro/Psych:   -- Sedation: none    -- Pain: PRN Oxy + Dilaudid  -- Scheduled Tylenol             Cards:   -- S/P MVR, TVR, MAZE on 8/10/22  -- HDS without inotropic or vasopressor support.  -- Ventricular pacing wires. Paced at 80  -- MAP > 65, Syst < 140  -- Cleviprex PRN  -- Aspirin 325mg       Pulm:   -- Goal O2 sat > 90%  -- ABG PRN  -- Mediastinal chest tubes x2 and pleural chest tube x1 to suction      Renal:  -- Keep wang for strict I/O  -- Trend BUN/Cr   -- 40 lasix BID      FEN / GI:   -- Replace lytes as needed  -- Nutrition: NPO, advance to clears if passing bedside swallow  -- GI ppx:  famotidine  -- Bowel reg: miralax  -- 5% Albumin PRN      ID:   -- Tm: afebrile; WBC stable  -- Autumn-op ancef      Heme/Onc:   -- H/H stable   -- Daily CBC  -- 150mL Cell saver given back  -- Post-op coags wnl  -- Aspirin 325mg QD      Endo:   -- BG goal 140-180  -- Insulin gtt      PPx:   Feeding: NPO  Analgesia/Sedation: none / PRN Oxy + Dilaudid  Thromboembolic prevention: SCDs  HOB >30: Yes  Stress Ulcer ppx: famotidine  Glucose control: Critical care goal 140-180 g/dl, ISS     Lines/Drains/Airway: CVC RIJ, Yin, Chest tubes x 3, ventricular pacing wires, L radial A-line      Dispo/Code Status/Palliative:   -- SICU / Full Code.            Mindi Thayer MD  Critical Care - Surgery  [facility] - [facility]

## 2022-08-11 NOTE — SUBJECTIVE & OBJECTIVE
Interval History/Significant Events: No acute events overnight. Hemodynamically stable without pressor or inotropic support. Paced at 80 due to bradycardic intrinsic rate. Extubated this morning without difficulty.    Follow-up For: Procedure(s) (LRB):  VALVULOPLASTY,MITRAL VALVE (N/A)  REPAIR, TRICUSPID VALVE (N/A)  KIRKPATRICK MAZE PROCEDURE (N/A)    Post-Operative Day: 1 Day Post-Op    Objective:     Vital Signs (Most Recent):  Temp: 100.22 °F (37.9 °C) (08/11/22 0645)  Pulse: 79 (08/11/22 0827)  Resp: 18 (08/11/22 0827)  BP: (!) 151/72 (08/10/22 1945)  SpO2: 100 % (08/11/22 0827)   Vital Signs (24h Range):  Temp:  [95.54 °F (35.3 °C)-100.22 °F (37.9 °C)] 100.22 °F (37.9 °C)  Pulse:  [45-86] 79  Resp:  [2-25] 18  SpO2:  [94 %-100 %] 100 %  BP: (151)/(72) 151/72  Arterial Line BP: ()/(41-72) 96/52     Weight: (!) 139.7 kg (307 lb 15.7 oz)  Body mass index is 51.25 kg/m².      Intake/Output Summary (Last 24 hours) at 8/11/2022 0834  Last data filed at 8/11/2022 0600  Gross per 24 hour   Intake 7087.31 ml   Output 2659 ml   Net 4428.31 ml       Physical Exam  Constitutional:       General: She is not in acute distress.  HENT:      Head: Normocephalic and atraumatic.   Eyes:      Pupils: Pupils are equal, round, and reactive to light.   Neck:      Comments: [facility] CVC  Cardiovascular:      Rate and Rhythm: Normal rate and regular rhythm.      Comments: Midline sternotomy c/d with dressing in place    2 mediastinal and 1 pleural chest tube to suction    V wires in place. Paced at 80  Pulmonary:      Effort: Pulmonary effort is normal. No respiratory distress.   Abdominal:      General: There is no distension.      Palpations: Abdomen is soft.   Genitourinary:     Comments: Yin in place  Musculoskeletal:      Right lower leg: No edema.      Left lower leg: No edema.      Comments: L radial arterial line   Skin:     General: Skin is warm and dry.      Capillary Refill: Capillary refill takes less than 2 seconds.    Neurological:      General: No focal deficit present.      Mental Status: She is alert and oriented to person, place, and time.       Vents:  Vent Mode: Spont (08/11/22 0557)  Ventilator Initiated: Yes (08/10/22 1600)  Set Rate: 14 BPM (08/11/22 0335)  Vt Set: 450 mL (08/11/22 0335)  Pressure Support: 10 cmH20 (08/11/22 0557)  PEEP/CPAP: 5 cmH20 (08/11/22 0557)  Oxygen Concentration (%): 30 (08/11/22 0600)  Peak Airway Pressure: 16 cmH2O (08/11/22 0557)  Plateau Pressure: 18 cmH20 (08/11/22 0557)  Total Ve: 9.9 mL (08/11/22 0557)  Negative Inspiratory Force (cm H2O): -37 (08/11/22 0557)  F/VT Ratio<105 (RSBI): (!) 21.65 (08/11/22 0557)    Lines/Drains/Airways       Central Venous Catheter Line  Duration              Introducer with Double Lumen 08/10/22 0848 <1 day    Introducer 08/10/22 0848 <1 day              Drain  Duration                  Chest Tube 08/10/22 1440 1 Right Mediastinal 19 Fr. <1 day         Chest Tube 08/10/22 1441 2 Left Mediastinal 19 Fr. <1 day         Chest Tube 08/10/22 1441 3 Right Pleural 19 Fr. <1 day         Urethral Catheter 08/10/22 0847 Straight-tip;Temperature probe;Non-latex 14 Fr. <1 day              Arterial Line  Duration             Arterial Line 08/10/22 0843 Left Radial <1 day              Line  Duration                  Pacer Wires 08/10/22 1430 <1 day                    Significant Labs:    CBC/Anemia Profile:  Recent Labs   Lab 08/09/22  1143 08/10/22  0927 08/10/22  1603 08/10/22  1608 08/10/22  2354 08/11/22  0143 08/11/22  0432   WBC 2.54*  --  17.10*  --   --   --  7.71   HGB 12.8  --  10.5*  --   --   --  9.4*   HCT 39.3   < > 32.0*   < > 30* 27* 29.1*     --  105*  --   --   --  86*   MCV 76*  --  76*  --   --   --  75*   RDW 14.0  --  14.4  --   --   --  14.1    < > = values in this interval not displayed.        Chemistries:  Recent Labs   Lab 08/09/22  1143 08/10/22  1603 08/10/22  2007 08/11/22  0432    142  --  141   K 3.2* 2.8* 3.3* 4.6     112*  --  113*   CO2 29 18*  --  18*   BUN 11 10  --  10   CREATININE 1.0 0.9  --  0.9   CALCIUM 9.2 6.9*  --  8.3*   ALBUMIN 3.3* 1.9*  --  3.5   PROT 6.9 4.3*  --  5.3*   BILITOT 0.7 1.1*  --  0.9   ALKPHOS 132 108  --  95   ALT 18 96*  --  65*   AST 28 336*  --  231*   MG  --  2.3  2.3  --  2.3   PHOS  --  2.9  2.9  --  3.2       ABGs:   Recent Labs   Lab 08/11/22  0143   PH 7.448   PCO2 25.9*   HCO3 17.9*   POCSATURATED 99   BE -6     Coagulation:   Recent Labs   Lab 08/11/22  0432   INR 1.2   APTT 25.0     All pertinent labs within the past 24 hours have been reviewed.    Significant Imaging:  I have reviewed all pertinent imaging results/findings within the past 24 hours.

## 2022-08-11 NOTE — PLAN OF CARE
PT eval complete and POC established.    2022      Problem: Physical Therapy  Goal: Physical Therapy Goal  Description: Goals to be met by: 2022     Patient will increase functional independence with mobility by performin. Supine to sit with Contact Guard Assistance  2. Sit to supine with Contact Guard Assistance  3. Rolling to Left and Right with Contact Guard Assistance.  4. Sit to stand transfer with Contact Guard Assistance  5. Bed to chair transfer with Contact Guard Assistance using No Assistive Device  6. Gait  x 200 feet with Contact Guard Assistance using No Assistive Device.   7. Ascend/descend 6 stair with right Handrails Minimal Assistance using No Assistive Device.     Outcome: Ongoing, Progressing

## 2022-08-11 NOTE — ANESTHESIA POSTPROCEDURE EVALUATION
Anesthesia Post Evaluation    Patient: Magalys Bass    Procedure(s) Performed: Procedure(s) (LRB):  VALVULOPLASTY,MITRAL VALVE (N/A)  REPAIR, TRICUSPID VALVE (N/A)  KIRKPATRICK MAZE PROCEDURE (N/A)    Final Anesthesia Type: general      Patient location during evaluation: ICU  Patient participation: No - Unable to Participate, Intubation  Level of consciousness: responds to stimulation  Post-procedure vital signs: reviewed and stable  Pain management: adequate  Airway patency: patent    PONV status at discharge: No PONV  Anesthetic complications: no      Cardiovascular status: hemodynamically stable  Respiratory status: ventilator and ETT  Hydration status: euvolemic  Follow-up not needed.          Vitals Value Taken Time   /72 08/10/22 1945   Temp 37.9 °C (100.22 °F) 08/11/22 0627   Pulse 79 08/11/22 0627   Resp 17 08/11/22 0627   SpO2 100 % 08/11/22 0627   Vitals shown include unvalidated device data.      No case tracking events are documented in the log.      Pain/Shanique Score: Pain Rating Prior to Med Admin: 0 (8/10/2022  9:00 PM)  Pain Rating Post Med Admin: 0 (8/10/2022 10:00 PM)

## 2022-08-11 NOTE — PROGRESS NOTES
"[facility] - [facility]  Endocrinology  Progress Note    Admit Date: 8/10/2022     Reason for Consult: Management of Hyperglycemia     Surgical Procedure and Date:   8/10/22  VALVULOPLASTY,MITRAL VALVE (N/A)  REPAIR, TRICUSPID VALVE (N/A)  KIRKPATRICK MAZE PROCEDURE (N/A)       HPI:   Patient is a 61 y.o. female with a diagnosis of HTN, pAF (on eliquis), GERD, obesity (BMI 48, s/p gastric sleeve 2017), former smoker, HFpEF (EF ~60%), moderate-to-severe MR, moderate tricuspid regurgitation (severe on prior ECHO 3/15/22), mild AR who now presents for the above procedure(s). Endocrinology consulted for management of hyperglycemia.       Lab Results   Component Value Date    HGBA1C 5.2 08/09/2022           Interval HPI:   Overnight events: Remains in SICU. POD 1. Extubated. BG well controlled on IV intensive insulin protocol with infusion rates ranging from 0.8-2.4 u/hr. Diet NPO Except for: Sips with Medication    Eating:   NPO  Nausea: No  Hypoglycemia and intervention: No  Fever: No  TPN and/or TF: No  If yes, type of TF/TPN and rate: n/a    BP (!) 151/72   Pulse 79   Temp 100.22 °F (37.9 °C)   Resp 18   Ht 5' 5" (1.651 m)   Wt (!) 139.7 kg (307 lb 15.7 oz)   LMP 08/01/2014   SpO2 100%   Breastfeeding No   BMI 51.25 kg/m²     Labs Reviewed and Include    Recent Labs   Lab 08/11/22  0432 08/11/22  0807   *  --    CALCIUM 8.3*  --    ALBUMIN 3.5  --    PROT 5.3*  --      --    K 4.6 4.4   CO2 18*  --    *  --    BUN 10  --    CREATININE 0.9  --    ALKPHOS 95  --    ALT 65*  --    *  --    BILITOT 0.9  --      Lab Results   Component Value Date    WBC 7.71 08/11/2022    HGB 9.4 (L) 08/11/2022    HCT 31 (L) 08/11/2022    MCV 75 (L) 08/11/2022    PLT 86 (L) 08/11/2022     No results for input(s): TSH, FREET4 in the last 168 hours.  Lab Results   Component Value Date    HGBA1C 5.2 08/09/2022       Nutritional status:   Body mass index is 51.25 kg/m².  Lab Results   Component Value Date "    ALBUMIN 3.5 08/11/2022    ALBUMIN 1.9 (L) 08/10/2022    ALBUMIN 3.3 (L) 08/09/2022     No results found for: PREALBUMIN    Estimated Creatinine Clearance: 91 mL/min (based on SCr of 0.9 mg/dL).    Accu-Checks  Recent Labs     08/10/22  2035 08/10/22  2127 08/10/22  2233 08/10/22  2334 08/11/22  0028 08/11/22  0139 08/11/22  0321 08/11/22  0431 08/11/22  0527 08/11/22  0621   POCTGLUCOSE 111* 122* 186* 178* 116* 132* 111* 115* 111* 107       Current Medications and/or Treatments Impacting Glycemic Control  Immunotherapy:    Immunosuppressants       None          Steroids:   Hormones (From admission, onward)                None          Pressors:    Autonomic Drugs (From admission, onward)                None          Hyperglycemia/Diabetes Medications:   Antihyperglycemics (From admission, onward)                Start     Stop Route Frequency Ordered    08/10/22 1530  insulin regular in 0.9 % NaCl 100 unit/100 mL (1 unit/mL) infusion        Question Answer Comment   Insulin rate changes (DO NOT MODIFY ANSWER) \\[email or web address]\epic\Images\Pharmacy\InsulinInfusions\CTS INSULIN [id number].pdf    Enter initial dose (Units/hr): 1        -- IV Continuous 08/10/22 1523            ASSESSMENT and PLAN    * Nonrheumatic mitral valve regurgitation  Managed per primary team  Optimize BG control        Stress hyperglycemia  BG goal 110-140 (CTS protocol)     Discontinue IV insulin infusion protocol (rate decreasing)   Start Moderate Dose Correction Scale   BG monitoring q 4 hrs while NPO     ** Please call Endocrine for any BG related issues **    ** Please notify Endocrine for any change and/or advance in diet**    Discharge planning: TBD        Morbid obesity  Body mass index is 51.25 kg/m².  May increase insulin resistance.         Paroxysmal atrial fibrillation  May increase insulin resistance.         S/P mitral valve repair    Managed per primary team  Optimize BG control          Violet Brown, NP  Endocrinology  [facility] -  Surgical Intensive Care

## 2022-08-11 NOTE — PT/OT/SLP EVAL
Occupational Therapy   Evaluation    Name: Magalys Bass  MRN: [id number]  Admitting Diagnosis:  Nonrheumatic mitral valve regurgitation  Recent Surgery: Procedure(s) (LRB):  VALVULOPLASTY,MITRAL VALVE (N/A)  REPAIR, TRICUSPID VALVE (N/A)  KIRKPATRICK MAZE PROCEDURE (N/A) 1 Day Post-Op    Recommendations:     Discharge Recommendations: home health OT  Discharge Equipment Recommendations:  bath bench  Barriers to discharge:  None    Assessment:     Magalys Bass is a 61 y.o. female with a medical diagnosis of Nonrheumatic mitral valve regurgitation.  She presents s/p Mitral valve repair on 8/10. Pt was able to take 3 sidesteps with Min A this session. Performance deficits affecting function: weakness, impaired endurance, impaired self care skills, impaired functional mobility, gait instability, impaired balance, decreased coordination, decreased safety awareness, decreased upper extremity function, decreased lower extremity function.      Rehab Prognosis: Good; patient would benefit from acute skilled OT services to address these deficits and reach maximum level of function.       Plan:     Patient to be seen 5 x/week to address the above listed problems via self-care/home management, therapeutic activities, therapeutic exercises, neuromuscular re-education  · Plan of Care Expires: 09/10/22  · Plan of Care Reviewed with: patient, daughter    Subjective     Occupational Profile:  Living Environment: Pt lives with her  in a H with 6 steps/RHR and a T/S setup.  Previous level of function: (I) with ADLs / uses a SPC PRN; drives.  Equipment Used at Home:  cane, straight  Assistance upon Discharge: family    Pain/Comfort:  · Pain Rating 1: 0/10    Patients cultural, spiritual, Hinduism conflicts given the current situation:      Objective:     Communicated with: rn prior to session.  Patient found supine with arterial line, central line, blood pressure cuff, chest tube, wang catheter, external pacer, oxygen,  pulse ox (continuous), telemetry upon OT entry to room.    General Precautions: Standard, fall, sternal   Orthopedic Precautions:    Braces:    Respiratory Status: Nasal cannula, flow 3 L/min    Occupational Performance:    Bed Mobility:    · Patient completed Scooting/Bridging with maximal assistance  · Patient completed Supine to Sit with maximal assistance and 2 persons  · Patient completed Sit to Supine with maximal assistance and 2 persons    Functional Mobility/Transfers:  · Patient completed Sit <> Stand Transfer with minimum assistance  with  no assistive device   · Functional Mobility: Pt was able to take 3 sidesteps with Min A this session.    Activities of Daily Living:  · Feeding:  independence   · Grooming: stand by assistance EOB.  · Lower Body Dressing: total assistance    Cognitive/Visual Perceptual:  Cognitive/Psychosocial Skills:     -       Oriented to: Person, Place, Time and Situation   -       Safety awareness/insight to disability: intact     Physical Exam:  BUE AROM/MMT: WFL adhering to sternal precautions    AMPAC 6 Click ADL:  AMPAC Total Score: 17    Treatment & Education:  UE ROM/MMT  Bed mobility training / assessment  Functional mobility assessment  Sit/standing balance assessment  Educated on importance of sitting OOB in bedside chair to promote increased strength, endurance & breathing.  Discussed OT POC / Post-acute plan  Education:    Patient left supine with all lines intact and call button in reach    GOALS:   Multidisciplinary Problems     Occupational Therapy Goals        Problem: Occupational Therapy    Goal Priority Disciplines Outcome Interventions   Occupational Therapy Goal     OT, PT/OT Ongoing, Progressing    Description: Goals to be met by: 8/18/22     Patient will increase functional independence with ADLs by performing:    LE Dressing with Minimal Assistance.  Grooming while standing with Contact Guard Assistance.  Toileting from bedside commode with Minimal Assistance  for hygiene and clothing management.   Supine to sit with Minimal Assistance.  Step transfer with Contact Guard Assistance  Toilet transfer to bedside commode with Contact Guard Assistance.                     History:     Past Medical History:   Diagnosis Date    Anticoagulant long-term use     Depression     GERD (gastroesophageal reflux disease)     Hypertension     Medical history non-contributory     Paroxysmal A-fib        Past Surgical History:   Procedure Laterality Date    CORONARY ANGIOGRAPHY N/A 4/13/2022    Procedure: ANGIOGRAM, CORONARY ARTERY;  Surgeon: Isauro Perdomo MD;  Location: [facility] CATH [facility];  Service: Cardiology;  Laterality: N/A;    KRIKPATRICK MAZE PROCEDURE N/A 8/10/2022    Procedure: KIRKPATRICK MAZE PROCEDURE;  Surgeon: Manish Morris MD;  Location: [facility] OR [address] [place] [address];  Service: Cardiothoracic;  Laterality: N/A;  Maze    FINGER SURGERY Left     index fracture repair    GALLBLADDER SURGERY  08/2021    KNEE ARTHROSCOPY      SHOULDER ARTHROSCOPY      SHOULDER SURGERY      TRICUSPID VALVULOPLASTY N/A 8/10/2022    Procedure: REPAIR, TRICUSPID VALVE;  Surgeon: Manish Morris MD;  Location: [address];  Service: Cardiothoracic;  Laterality: N/A;       Time Tracking:     OT Date of Treatment: 08/11/22  OT Start Time: 1354  OT Stop Time: 1428  OT Total Time (min): 34 min    Billable Minutes:Evaluation 12  Self Care/Home Management 11  Therapeutic Activity 11    8/11/2022

## 2022-08-11 NOTE — ASSESSMENT & PLAN NOTE
61 y.o. female with chronic diastolic HF (EF 60%), pAF (on Eliquis), HTN, HLD, obesity s/p gastric sleeve (2017) now s/p MVR, TVR, and MAZE on 8/10/22.      Neuro/Psych:   -- Sedation: none    -- Pain: PRN Oxy + Dilaudid  -- Scheduled Tylenol             Cards:   -- S/P MVR, TVR, MAZE on 8/10/22  -- HDS without inotropic or vasopressor support.  -- Ventricular pacing wires. Paced at 80  -- MAP > 65, Syst < 140  -- Cleviprex PRN  -- Aspirin 325mg       Pulm:   -- Goal O2 sat > 90%  -- ABG PRN  -- Mediastinal chest tubes x2 and pleural chest tube x1 to suction      Renal:  -- Keep wang for strict I/O  -- Trend BUN/Cr   -- 40 lasix BID      FEN / GI:   -- Replace lytes as needed  -- Nutrition: NPO, advance to clears if passing bedside swallow  -- GI ppx: famotidine  -- Bowel reg: miralax  -- 5% Albumin PRN      ID:   -- Tm: afebrile; WBC stable  -- Autumn-op ancef      Heme/Onc:   -- H/H stable   -- Daily CBC  -- 150mL Cell saver given back  -- Post-op coags wnl  -- Aspirin 325mg QD      Endo:   -- BG goal 140-180  -- Insulin gtt      PPx:   Feeding: NPO  Analgesia/Sedation: none / PRN Oxy + Dilaudid  Thromboembolic prevention: SCDs  HOB >30: Yes  Stress Ulcer ppx: famotidine  Glucose control: Critical care goal 140-180 g/dl, ISS     Lines/Drains/Airway: CVC RIAZ Wang, Chest tubes x 3, ventricular pacing wires, L radial A-line      Dispo/Code Status/Palliative:   -- SICU / Full Code.

## 2022-08-11 NOTE — PLAN OF CARE
CM met with the patient and her spouse to discuss discharge planning patient lives with spouse in a 1 story house she is not on dialysis and does not take coumadin she has transportation home   [facility]  [address] [phone]  [facility] - Surgical Intensive Care  Initial Discharge Assessment       Primary Care Provider: Yonathan Brewer MD    Admission Diagnosis: Paroxysmal atrial fibrillation [I48.0]  Nonrheumatic mitral valve regurgitation [I34.0]  Nonrheumatic tricuspid valve regurgitation [I36.1]    Admission Date: 8/10/2022  Expected Discharge Date:     Discharge Barriers Identified: Underinsured    Payor: MEDICAID / Plan: [facility] COMMUNITY PLAN [facility] ([place] MEDICAID) / Product Type: Managed Medicaid /     Extended Emergency Contact Information  Primary Emergency Contact: Carmine Bass  Address: [address] [place] [address] [facility]  Home Phone: [phone]  Mobile Phone: [phone]  Relation: Spouse  Secondary Emergency Contact: Ismael Nguyen   [place]  Mobile Phone: [phone]  Relation: Son    Discharge Plan A: Home, Home with family  Discharge Plan B: Home with family, Home      [id number] - [place], [place] - [address] AT [facility] [address]  [address]  [place] [address]  Phone: [phone] Fax: [phone]      Initial Assessment (most recent)     Adult Discharge Assessment - 08/11/22 1140        Discharge Assessment    Assessment Type Discharge Planning Assessment     Confirmed/corrected address, phone number and insurance Yes     Confirmed Demographics Correct on Facesheet     Source of Information family;patient     When was your last doctors appointment? 08/01/22     Communicated GISSELL with patient/caregiver Date not available/Unable to determine     Lives With spouse     Do you expect to return to your current living situation? Yes     Do you have help at  home or someone to help you manage your care at home? Yes     Prior to hospitilization cognitive status: No Deficits     Current cognitive status: No Deficits     Walking or Climbing Stairs Difficulty none     Dressing/Bathing Difficulty none     Home Layout Able to live on 1st floor     Equipment Currently Used at Home cane, straight     Readmission within 30 days? No     Patient currently being followed by outpatient case management? No     Do you currently have service(s) that help you manage your care at home? No     Do you take prescription medications? Yes     Do you have prescription coverage? Yes     Do you have any problems affording any of your prescribed medications? No     Is the patient taking medications as prescribed? yes     Who is going to help you get home at discharge? SPOUSE MILADY MARTINEZ      How do you get to doctors appointments? car, drives self;family or friend will provide     Are you on dialysis? No     Do you take coumadin? No     Discharge Plan A Home;Home with family     Discharge Plan B Home with family;Home     DME Needed Upon Discharge  none     Discharge Plan discussed with: Spouse/sig other;Patient     Discharge Barriers Identified Underinsured                         LAST APPT 08/22  POA/SPOUSE MILADY MARTINEZ  [phone]

## 2022-08-11 NOTE — SUBJECTIVE & OBJECTIVE
"Interval HPI:   Overnight events: Remains in SICU. POD 1. Extubated. BG well controlled on IV intensive insulin protocol with infusion rates ranging from 0.8-2.4 u/hr. Diet NPO Except for: Sips with Medication    Eating:   NPO  Nausea: No  Hypoglycemia and intervention: No  Fever: No  TPN and/or TF: No  If yes, type of TF/TPN and rate: n/a    BP (!) 151/72   Pulse 79   Temp 100.22 °F (37.9 °C)   Resp 18   Ht 5' 5" (1.651 m)   Wt (!) 139.7 kg (307 lb 15.7 oz)   LMP 08/01/2014   SpO2 100%   Breastfeeding No   BMI 51.25 kg/m²     Labs Reviewed and Include    Recent Labs   Lab 08/11/22 0432 08/11/22  0807   *  --    CALCIUM 8.3*  --    ALBUMIN 3.5  --    PROT 5.3*  --      --    K 4.6 4.4   CO2 18*  --    *  --    BUN 10  --    CREATININE 0.9  --    ALKPHOS 95  --    ALT 65*  --    *  --    BILITOT 0.9  --      Lab Results   Component Value Date    WBC 7.71 08/11/2022    HGB 9.4 (L) 08/11/2022    HCT 31 (L) 08/11/2022    MCV 75 (L) 08/11/2022    PLT 86 (L) 08/11/2022     No results for input(s): TSH, FREET4 in the last 168 hours.  Lab Results   Component Value Date    HGBA1C 5.2 08/09/2022       Nutritional status:   Body mass index is 51.25 kg/m².  Lab Results   Component Value Date    ALBUMIN 3.5 08/11/2022    ALBUMIN 1.9 (L) 08/10/2022    ALBUMIN 3.3 (L) 08/09/2022     No results found for: PREALBUMIN    Estimated Creatinine Clearance: 91 mL/min (based on SCr of 0.9 mg/dL).    Accu-Checks  Recent Labs     08/10/22  2035 08/10/22  2127 08/10/22  2233 08/10/22  2334 08/11/22  0028 08/11/22  0139 08/11/22  0321 08/11/22  0431 08/11/22  0527 08/11/22  0621   POCTGLUCOSE 111* 122* 186* 178* 116* 132* 111* 115* 111* 107       Current Medications and/or Treatments Impacting Glycemic Control  Immunotherapy:    Immunosuppressants       None          Steroids:   Hormones (From admission, onward)                None          Pressors:    Autonomic Drugs (From admission, onward)                " None          Hyperglycemia/Diabetes Medications:   Antihyperglycemics (From admission, onward)                Start     Stop Route Frequency Ordered    08/10/22 1530  insulin regular in 0.9 % NaCl 100 unit/100 mL (1 unit/mL) infusion        Question Answer Comment   Insulin rate changes (DO NOT MODIFY ANSWER) \\[email or web address]\epic\Images\Pharmacy\InsulinInfusions\CTS INSULIN [id number].pdf    Enter initial dose (Units/hr): 1        -- IV Continuous 08/10/22 1529

## 2022-08-12 LAB
ALBUMIN SERPL BCP-MCNC: 3.1 G/DL (ref 3.5–5.2)
ALP SERPL-CCNC: 124 U/L (ref 55–135)
ALT SERPL W/O P-5'-P-CCNC: 32 U/L (ref 10–44)
ANION GAP SERPL CALC-SCNC: 11 MMOL/L (ref 8–16)
APTT BLDCRRT: 29.6 SEC (ref 21–32)
AST SERPL-CCNC: 130 U/L (ref 10–40)
BASOPHILS # BLD AUTO: 0.01 K/UL (ref 0–0.2)
BASOPHILS NFR BLD: 0.1 % (ref 0–1.9)
BILIRUB SERPL-MCNC: 0.8 MG/DL (ref 0.1–1)
BUN SERPL-MCNC: 11 MG/DL (ref 8–23)
CALCIUM SERPL-MCNC: 8.4 MG/DL (ref 8.7–10.5)
CHLORIDE SERPL-SCNC: 106 MMOL/L (ref 95–110)
CO2 SERPL-SCNC: 23 MMOL/L (ref 23–29)
CREAT SERPL-MCNC: 1.1 MG/DL (ref 0.5–1.4)
DIFFERENTIAL METHOD: ABNORMAL
EOSINOPHIL # BLD AUTO: 0 K/UL (ref 0–0.5)
EOSINOPHIL NFR BLD: 0 % (ref 0–8)
ERYTHROCYTE [DISTWIDTH] IN BLOOD BY AUTOMATED COUNT: 14.7 % (ref 11.5–14.5)
EST. GFR  (NO RACE VARIABLE): 56.5 ML/MIN/1.73 M^2
FINAL PATHOLOGIC DIAGNOSIS: NORMAL
GLUCOSE SERPL-MCNC: 136 MG/DL (ref 70–110)
GROSS: NORMAL
HCT VFR BLD AUTO: 28.4 % (ref 37–48.5)
HGB BLD-MCNC: 9.4 G/DL (ref 12–16)
IMM GRANULOCYTES # BLD AUTO: 0.04 K/UL (ref 0–0.04)
IMM GRANULOCYTES NFR BLD AUTO: 0.4 % (ref 0–0.5)
INR PPP: 1.1 (ref 0.8–1.2)
LYMPHOCYTES # BLD AUTO: 0.9 K/UL (ref 1–4.8)
LYMPHOCYTES NFR BLD: 8.1 % (ref 18–48)
Lab: NORMAL
MAGNESIUM SERPL-MCNC: 1.9 MG/DL (ref 1.6–2.6)
MCH RBC QN AUTO: 24.1 PG (ref 27–31)
MCHC RBC AUTO-ENTMCNC: 33.1 G/DL (ref 32–36)
MCV RBC AUTO: 73 FL (ref 82–98)
MONOCYTES # BLD AUTO: 0.9 K/UL (ref 0.3–1)
MONOCYTES NFR BLD: 8.3 % (ref 4–15)
NEUTROPHILS # BLD AUTO: 9 K/UL (ref 1.8–7.7)
NEUTROPHILS NFR BLD: 83.1 % (ref 38–73)
NRBC BLD-RTO: 0 /100 WBC
PHOSPHATE SERPL-MCNC: 2.9 MG/DL (ref 2.7–4.5)
PLATELET # BLD AUTO: 81 K/UL (ref 150–450)
PMV BLD AUTO: 12.4 FL (ref 9.2–12.9)
POCT GLUCOSE: 112 MG/DL (ref 70–110)
POCT GLUCOSE: 120 MG/DL (ref 70–110)
POCT GLUCOSE: 126 MG/DL (ref 70–110)
POCT GLUCOSE: 130 MG/DL (ref 70–110)
POCT GLUCOSE: 156 MG/DL (ref 70–110)
POTASSIUM SERPL-SCNC: 3.5 MMOL/L (ref 3.5–5.1)
POTASSIUM SERPL-SCNC: 3.6 MMOL/L (ref 3.5–5.1)
POTASSIUM SERPL-SCNC: 3.9 MMOL/L (ref 3.5–5.1)
PROT SERPL-MCNC: 5.5 G/DL (ref 6–8.4)
PROTHROMBIN TIME: 11.8 SEC (ref 9–12.5)
RBC # BLD AUTO: 3.9 M/UL (ref 4–5.4)
SODIUM SERPL-SCNC: 140 MMOL/L (ref 136–145)
WBC # BLD AUTO: 10.84 K/UL (ref 3.9–12.7)

## 2022-08-12 PROCEDURE — 63600175 PHARM REV CODE 636 W HCPCS: Performed by: PHYSICIAN ASSISTANT

## 2022-08-12 PROCEDURE — 97530 THERAPEUTIC ACTIVITIES: CPT

## 2022-08-12 PROCEDURE — 99900035 HC TECH TIME PER 15 MIN (STAT)

## 2022-08-12 PROCEDURE — 25000003 PHARM REV CODE 250: Performed by: STUDENT IN AN ORGANIZED HEALTH CARE EDUCATION/TRAINING PROGRAM

## 2022-08-12 PROCEDURE — 80053 COMPREHEN METABOLIC PANEL: CPT | Performed by: STUDENT IN AN ORGANIZED HEALTH CARE EDUCATION/TRAINING PROGRAM

## 2022-08-12 PROCEDURE — 94799 UNLISTED PULMONARY SVC/PX: CPT

## 2022-08-12 PROCEDURE — 85025 COMPLETE CBC W/AUTO DIFF WBC: CPT | Performed by: PHYSICIAN ASSISTANT

## 2022-08-12 PROCEDURE — 85730 THROMBOPLASTIN TIME PARTIAL: CPT | Performed by: PHYSICIAN ASSISTANT

## 2022-08-12 PROCEDURE — 99232 PR SUBSEQUENT HOSPITAL CARE,LEVL II: ICD-10-PCS | Mod: ,,, | Performed by: ANESTHESIOLOGY

## 2022-08-12 PROCEDURE — 85610 PROTHROMBIN TIME: CPT | Performed by: PHYSICIAN ASSISTANT

## 2022-08-12 PROCEDURE — 84100 ASSAY OF PHOSPHORUS: CPT | Performed by: PHYSICIAN ASSISTANT

## 2022-08-12 PROCEDURE — 25000003 PHARM REV CODE 250

## 2022-08-12 PROCEDURE — 94761 N-INVAS EAR/PLS OXIMETRY MLT: CPT

## 2022-08-12 PROCEDURE — 25000003 PHARM REV CODE 250: Performed by: PHYSICIAN ASSISTANT

## 2022-08-12 PROCEDURE — 84132 ASSAY OF SERUM POTASSIUM: CPT | Performed by: PHYSICIAN ASSISTANT

## 2022-08-12 PROCEDURE — 83735 ASSAY OF MAGNESIUM: CPT | Performed by: PHYSICIAN ASSISTANT

## 2022-08-12 PROCEDURE — 63600175 PHARM REV CODE 636 W HCPCS

## 2022-08-12 PROCEDURE — 99232 SBSQ HOSP IP/OBS MODERATE 35: CPT | Mod: ,,, | Performed by: ANESTHESIOLOGY

## 2022-08-12 PROCEDURE — 20000000 HC ICU ROOM

## 2022-08-12 RX ORDER — SODIUM,POTASSIUM PHOSPHATES 280-250MG
2 POWDER IN PACKET (EA) ORAL
Status: DISCONTINUED | OUTPATIENT
Start: 2022-08-12 | End: 2022-08-16

## 2022-08-12 RX ORDER — ACETAMINOPHEN 325 MG/1
650 TABLET ORAL EVERY 8 HOURS
Status: DISCONTINUED | OUTPATIENT
Start: 2022-08-12 | End: 2022-08-16 | Stop reason: HOSPADM

## 2022-08-12 RX ORDER — OXYCODONE HYDROCHLORIDE 5 MG/1
5 TABLET ORAL EVERY 4 HOURS PRN
Status: DISCONTINUED | OUTPATIENT
Start: 2022-08-12 | End: 2022-08-16 | Stop reason: HOSPADM

## 2022-08-12 RX ORDER — FAMOTIDINE 20 MG/1
20 TABLET, FILM COATED ORAL 2 TIMES DAILY
Status: DISCONTINUED | OUTPATIENT
Start: 2022-08-12 | End: 2022-08-16 | Stop reason: HOSPADM

## 2022-08-12 RX ORDER — OXYCODONE HYDROCHLORIDE 10 MG/1
10 TABLET ORAL EVERY 4 HOURS PRN
Status: DISCONTINUED | OUTPATIENT
Start: 2022-08-12 | End: 2022-08-16 | Stop reason: HOSPADM

## 2022-08-12 RX ORDER — HYDROMORPHONE HYDROCHLORIDE 1 MG/ML
1 INJECTION, SOLUTION INTRAMUSCULAR; INTRAVENOUS; SUBCUTANEOUS
Status: DISCONTINUED | OUTPATIENT
Start: 2022-08-12 | End: 2022-08-16

## 2022-08-12 RX ADMIN — HYDROMORPHONE HYDROCHLORIDE 1 MG: 1 INJECTION, SOLUTION INTRAMUSCULAR; INTRAVENOUS; SUBCUTANEOUS at 06:08

## 2022-08-12 RX ADMIN — DOCUSATE SODIUM 100 MG: 100 CAPSULE ORAL at 08:08

## 2022-08-12 RX ADMIN — HYDROMORPHONE HYDROCHLORIDE 1 MG: 1 INJECTION, SOLUTION INTRAMUSCULAR; INTRAVENOUS; SUBCUTANEOUS at 04:08

## 2022-08-12 RX ADMIN — DEXTROSE 2 G: 50 INJECTION, SOLUTION INTRAVENOUS at 03:08

## 2022-08-12 RX ADMIN — POLYETHYLENE GLYCOL 3350 17 G: 17 POWDER, FOR SOLUTION ORAL at 08:08

## 2022-08-12 RX ADMIN — FUROSEMIDE 40 MG: 10 INJECTION, SOLUTION INTRAMUSCULAR; INTRAVENOUS at 09:08

## 2022-08-12 RX ADMIN — HYDROMORPHONE HYDROCHLORIDE 1 MG: 1 INJECTION, SOLUTION INTRAMUSCULAR; INTRAVENOUS; SUBCUTANEOUS at 01:08

## 2022-08-12 RX ADMIN — DOCUSATE SODIUM 100 MG: 100 CAPSULE ORAL at 09:08

## 2022-08-12 RX ADMIN — ACETAMINOPHEN 650 MG: 325 TABLET ORAL at 02:08

## 2022-08-12 RX ADMIN — POTASSIUM BICARBONATE 50 MEQ: 978 TABLET, EFFERVESCENT ORAL at 02:08

## 2022-08-12 RX ADMIN — DEXTROSE 2 G: 50 INJECTION, SOLUTION INTRAVENOUS at 09:08

## 2022-08-12 RX ADMIN — MUPIROCIN 1 G: 20 OINTMENT TOPICAL at 08:08

## 2022-08-12 RX ADMIN — POTASSIUM CHLORIDE 20 MEQ: 14.9 INJECTION, SOLUTION INTRAVENOUS at 07:08

## 2022-08-12 RX ADMIN — ASPIRIN 325 MG ORAL TABLET 325 MG: 325 PILL ORAL at 08:08

## 2022-08-12 RX ADMIN — ACETAMINOPHEN 650 MG: 325 TABLET ORAL at 09:08

## 2022-08-12 RX ADMIN — MUPIROCIN 1 G: 20 OINTMENT TOPICAL at 09:08

## 2022-08-12 RX ADMIN — ACETAMINOPHEN 650 MG: 325 TABLET ORAL at 06:08

## 2022-08-12 RX ADMIN — FAMOTIDINE 20 MG: 20 TABLET ORAL at 09:08

## 2022-08-12 RX ADMIN — OXYCODONE 5 MG: 5 TABLET ORAL at 09:08

## 2022-08-12 RX ADMIN — FUROSEMIDE 40 MG: 10 INJECTION, SOLUTION INTRAMUSCULAR; INTRAVENOUS at 08:08

## 2022-08-12 RX ADMIN — FAMOTIDINE 20 MG: 20 TABLET ORAL at 08:08

## 2022-08-12 NOTE — PLAN OF CARE
SICU PLAN OF CARE NOTE    Dx: Nonrheumatic mitral valve regurgitation    Shift Events: Up to chair per PT, rosana well. A line d/meka. No acute events during shift.     Gtts:  KVO    Neuro: AAOx4, follows commands  Cardiac: Paced rate set to 45bpm, HR 50-60bpm     Respiratory: On room air    GI: No BM today, no issues reported    :     Drains: x3 chest tubes in place, and draining well     Labs/Accuchecks: daily/    Skin: intact overall, has midline chest incision covered with gauze and island boarder dressing

## 2022-08-12 NOTE — CARE UPDATE
BG goal 140-180    -A1C   Lab Results   Component Value Date    HGBA1C 5.2 08/09/2022       -HOME REGIMEN: none; no hx of DM     -INPATIENT REGIMEN: correction scale     -GLUCOSE TREND FOR THE PAST 24HRS: 101-156    -NO HYPOGYCEMIAS NOTED     -Diet: Diet Cardiac Fluid - 1500mL    -Steroids - none    -Tube Feeds - n/a      Remains in SICU. POD 2. BG well controlled with no prn SQ insulin requirements. Diet progressed.     Plan:  -Continue Moderate Dose Correction Scale  -BG monitoring ac/hs    Discharge planning: TBD; likely no needs     Endocrine to continue to follow    ** Please call Endocrine for any BG related issues **

## 2022-08-12 NOTE — ASSESSMENT & PLAN NOTE
61 y.o. female with chronic diastolic HF (EF 60%), pAF (on Eliquis), HTN, HLD, obesity s/p gastric sleeve (2017) now s/p MVR, TVR, and MAZE on 8/10/22.      Neuro/Psych:   -- Sedation: none    -- Pain: PRN Oxy + Dilaudid  -- Scheduled Tylenol             Cards:   -- S/P MVR, TVR, MAZE on 8/10/22  -- HDS without inotropic or vasopressor support.  -- Ventricular pacing wires. Backup paced at 45.  -- MAP > 60-80, Syst < 140  -- Cleviprex PRN  -- Aspirin 325mg       Pulm:   -- Goal O2 sat > 90%  -- ABG PRN  -- Mediastinal chest tubes x2 and pleural chest tube x1 to suction, plan to remove today      Renal:  -- Remove wang today  -- Trend BUN/Cr   -- 40 lasix BID      FEN / GI:   -- Replace lytes as needed  -- Nutrition: Clears, advance to cardiac 1500 mL fluid restriction  -- GI ppx: famotidine  -- Bowel reg: miralax  -- 5% Albumin PRN      ID:   -- Tm: afebrile; WBC stable  -- Autumn-op ancef complete      Heme/Onc:   -- H/H stable   -- Daily CBC  -- 150mL Cell saver given back  -- Post-op coags wnl  -- Aspirin 325mg QD      Endo:   -- BG goal 140-180  -- SSI      PPx:   Feeding: Clears  Analgesia/Sedation: none / PRN Oxy + Dilaudid  Thromboembolic prevention: SCDs  HOB >30: Yes  Stress Ulcer ppx: famotidine  Glucose control: Critical care goal 140-180 g/dl, ISS     Lines/Drains/Airway: CVC RIJ, Wang, Chest tubes x 3, ventricular pacing wires, L radial A-line      Dispo/Code Status/Palliative:   -- SICU / Full Code.

## 2022-08-12 NOTE — SUBJECTIVE & OBJECTIVE
Interval History/Significant Events: No acute events overnight. Paced throughout the day yesterday due to intrinsic rate in the 40s. Intrinsic rate this morning in the upper 50s. HDS without inotropic or pressor support. Satting well on 3L.     Follow-up For: Procedure(s) (LRB):  VALVULOPLASTY,MITRAL VALVE (N/A)  REPAIR, TRICUSPID VALVE (N/A)  KIRKPATRICK MAZE PROCEDURE (N/A)    Post-Operative Day: 2 Days Post-Op    Objective:     Vital Signs (Most Recent):  Temp: 98.3 °F (36.8 °C) (08/12/22 0300)  Pulse: 79 (08/12/22 0400)  Resp: (!) 24 (08/12/22 0604)  BP: 135/67 (08/12/22 0400)  SpO2: 95 % (08/12/22 0400)   Vital Signs (24h Range):  Temp:  [98.2 °F (36.8 °C)-100.2 °F (37.9 °C)] 98.3 °F (36.8 °C)  Pulse:  [79] 79  Resp:  [16-33] 24  SpO2:  [93 %-100 %] 95 %  BP: (129-173)/(62-80) 135/67  Arterial Line BP: ()/(47-98) 91/49     Weight: (!) 139.7 kg (307 lb 15.7 oz)  Body mass index is 51.25 kg/m².      Intake/Output Summary (Last 24 hours) at 8/12/2022 0718  Last data filed at 8/12/2022 0100  Gross per 24 hour   Intake 5.65 ml   Output 3020 ml   Net -3014.35 ml       Physical Exam  Constitutional:       General: She is not in acute distress.  HENT:      Head: Normocephalic and atraumatic.   Eyes:      Pupils: Pupils are equal, round, and reactive to light.   Neck:      Comments: [facility] CVC  Cardiovascular:      Rate and Rhythm: Normal rate and regular rhythm.      Comments: Midline sternotomy c/d with dressing in place    2 mediastinal and 1 pleural chest tube to suction    V wires in place. Back up paced at 45.  Pulmonary:      Effort: Pulmonary effort is normal. No respiratory distress.   Abdominal:      General: There is no distension.      Palpations: Abdomen is soft.   Genitourinary:     Comments: Yin in place  Musculoskeletal:      Right lower leg: No edema.      Left lower leg: No edema.      Comments: L radial arterial line   Skin:     General: Skin is warm and dry.      Capillary Refill: Capillary refill takes  less than 2 seconds.   Neurological:      General: No focal deficit present.      Mental Status: She is alert and oriented to person, place, and time.       Vents:  Vent Mode: Spont (08/11/22 0557)  Ventilator Initiated: Yes (08/10/22 1600)  Set Rate: 14 BPM (08/11/22 0335)  Vt Set: 450 mL (08/11/22 0335)  Pressure Support: 10 cmH20 (08/11/22 0557)  PEEP/CPAP: 5 cmH20 (08/11/22 0557)  Oxygen Concentration (%): 30 (08/11/22 0600)  Peak Airway Pressure: 16 cmH2O (08/11/22 0557)  Plateau Pressure: 18 cmH20 (08/11/22 0557)  Total Ve: 9.9 mL (08/11/22 0557)  Negative Inspiratory Force (cm H2O): -37 (08/11/22 0557)  F/VT Ratio<105 (RSBI): (!) 21.65 (08/11/22 0557)    Lines/Drains/Airways       Central Venous Catheter Line  Duration              Introducer with Double Lumen 08/10/22 0848 1 day    Introducer 08/10/22 0848 1 day              Drain  Duration                  Chest Tube 08/10/22 1440 1 Right Mediastinal 19 Fr. 1 day         Chest Tube 08/10/22 1441 2 Left Mediastinal 19 Fr. 1 day         Chest Tube 08/10/22 1441 3 Right Pleural 19 Fr. 1 day         Urethral Catheter 08/10/22 0847 Straight-tip;Temperature probe;Non-latex 14 Fr. 1 day              Arterial Line  Duration             Arterial Line 08/10/22 0843 Left Radial 1 day              Line  Duration                  Pacer Wires 08/10/22 1430 1 day                    Significant Labs:    CBC/Anemia Profile:  Recent Labs   Lab 08/10/22  1603 08/10/22  1608 08/11/22  0432 08/11/22  0554 08/12/22  0341   WBC 17.10*  --  7.71  --  10.84   HGB 10.5*  --  9.4*  --  9.4*   HCT 32.0*   < > 29.1* 31* 28.4*   *  --  86*  --  81*   MCV 76*  --  75*  --  73*   RDW 14.4  --  14.1  --  14.7*    < > = values in this interval not displayed.        Chemistries:  Recent Labs   Lab 08/10/22  1603 08/10/22  2007 08/11/22  0432 08/11/22  0807 08/11/22 2021 08/12/22  0341     --  141  --   --  140   K 2.8*   < > 4.6 4.4 3.8 3.5   *  --  113*  --   --  106    CO2 18*  --  18*  --   --  23   BUN 10  --  10  --   --  11   CREATININE 0.9  --  0.9  --   --  1.1   CALCIUM 6.9*  --  8.3*  --   --  8.4*   ALBUMIN 1.9*  --  3.5  --   --  3.1*   PROT 4.3*  --  5.3*  --   --  5.5*   BILITOT 1.1*  --  0.9  --   --  0.8   ALKPHOS 108  --  95  --   --  124   ALT 96*  --  65*  --   --  32   *  --  231*  --   --  130*   MG 2.3  2.3  --  2.3  --   --  1.9   PHOS 2.9  2.9  --  3.2  --   --  2.9    < > = values in this interval not displayed.       ABGs:   Recent Labs   Lab 08/11/22  0554   PH 7.384   PCO2 35.0   HCO3 20.9*   POCSATURATED 99   BE -4     Coagulation:   Recent Labs   Lab 08/12/22  0341   INR 1.1   APTT 29.6     All pertinent labs within the past 24 hours have been reviewed.    Significant Imaging:  I have reviewed all pertinent imaging results/findings within the past 24 hours.

## 2022-08-12 NOTE — PT/OT/SLP PROGRESS
Occupational Therapy   Treatment    Name: Magalys Bass  MRN: [id number]  Admitting Diagnosis:  Nonrheumatic mitral valve regurgitation  2 Days Post-Op    Recommendations:     Discharge Recommendations: home health OT  Discharge Equipment Recommendations:  bath bench  Barriers to discharge:  None    Assessment:     Magalys Bass is a 61 y.o. female with a medical diagnosis of Nonrheumatic mitral valve regurgitation. Performance deficits affecting function are weakness, impaired endurance, impaired self care skills, impaired functional mobility, gait instability, impaired balance, decreased coordination, decreased safety awareness.     Rehab Prognosis:  Good; patient would benefit from acute skilled OT services to address these deficits and reach maximum level of function.       Plan:     Patient to be seen 5 x/week to address the above listed problems via self-care/home management, therapeutic activities, therapeutic exercises, neuromuscular re-education  · Plan of Care Expires: 09/10/22  · Plan of Care Reviewed with: patient, spouse    Subjective     Pain/Comfort:  · Pain Rating 1: 0/10    Objective:     Communicated with: rn prior to session.  Patient found supine with central line, chest tube, external pacer, pulse ox (continuous), telemetry upon OT entry to room.    General Precautions: Standard, fall, sternal   Orthopedic Precautions:    Braces:    Respiratory Status: Room air     Occupational Performance:     Bed Mobility:    · Patient completed Scooting/Bridging with maximal assistance  · Patient completed Supine to Sit with maximal assistance     Functional Mobility/Transfers:  · Patient completed Sit <> Stand Transfer with moderate assistance  with  no assistive device   · Patient completed Bed <> Chair Transfer using Stand Pivot technique with minimum assistance with hand-held assist    Activities of Daily Living:  · Lower Body Dressing: total assistance    [facility] 6 Click ADL: 17    Treatment &  Education:  Reviewed sternal precautions - pt required cues to recall 2/3 - handout issued.  Encouraged activity / OOB with staff over the weekend.  Discussed OT POC.    Patient left up in chair with all lines intact and call button in reachEducation:      GOALS:   Multidisciplinary Problems     Occupational Therapy Goals        Problem: Occupational Therapy    Goal Priority Disciplines Outcome Interventions   Occupational Therapy Goal     OT, PT/OT Ongoing, Progressing    Description: Goals to be met by: 8/18/22     Patient will increase functional independence with ADLs by performing:    LE Dressing with Minimal Assistance.  Grooming while standing with Contact Guard Assistance.  Toileting from bedside commode with Minimal Assistance for hygiene and clothing management.   Supine to sit with Minimal Assistance.  Step transfer with Contact Guard Assistance  Toilet transfer to bedside commode with Contact Guard Assistance.                     Time Tracking:     OT Date of Treatment: 08/12/22  OT Start Time: 0924  OT Stop Time: 0954  OT Total Time (min): 30 min    Billable Minutes:Therapeutic Activity 30    OT/LIAM: OT          8/12/2022

## 2022-08-12 NOTE — PROGRESS NOTES
[facility]  Critical Care - Surgery  Progress Note    Patient Name: Magalys Bass  MRN: [id number]  Admission Date: 8/10/2022  Hospital Length of Stay: 2 days  Code Status: Full Code  Attending Provider: Manish Morris MD  Primary Care Provider: Yonathan Brewer MD   Principal Problem: Nonrheumatic mitral valve regurgitation    Subjective:     Hospital/ICU Course:  No notes on file    Interval History/Significant Events: No acute events overnight. Paced throughout the day yesterday due to intrinsic rate in the 40s. Intrinsic rate this morning in the upper 50s. HDS without inotropic or pressor support. Satting well on 3L.     Follow-up For: Procedure(s) (LRB):  VALVULOPLASTY,MITRAL VALVE (N/A)  REPAIR, TRICUSPID VALVE (N/A)  KIRKPATRICK MAZE PROCEDURE (N/A)    Post-Operative Day: 2 Days Post-Op    Objective:     Vital Signs (Most Recent):  Temp: 98.3 °F (36.8 °C) (08/12/22 0300)  Pulse: 79 (08/12/22 0400)  Resp: (!) 24 (08/12/22 0604)  BP: 135/67 (08/12/22 0400)  SpO2: 95 % (08/12/22 0400)   Vital Signs (24h Range):  Temp:  [98.2 °F (36.8 °C)-100.2 °F (37.9 °C)] 98.3 °F (36.8 °C)  Pulse:  [79] 79  Resp:  [16-33] 24  SpO2:  [93 %-100 %] 95 %  BP: (129-173)/(62-80) 135/67  Arterial Line BP: ()/(47-98) 91/49     Weight: (!) 139.7 kg (307 lb 15.7 oz)  Body mass index is 51.25 kg/m².      Intake/Output Summary (Last 24 hours) at 8/12/2022 0718  Last data filed at 8/12/2022 0100  Gross per 24 hour   Intake 5.65 ml   Output 3020 ml   Net -3014.35 ml       Physical Exam  Constitutional:       General: She is not in acute distress.  HENT:      Head: Normocephalic and atraumatic.   Eyes:      Pupils: Pupils are equal, round, and reactive to light.   Neck:      Comments: [facility] CVC  Cardiovascular:      Rate and Rhythm: Normal rate and regular rhythm.      Comments: Midline sternotomy c/d with dressing in place    2 mediastinal and 1 pleural chest tube to suction    V wires in place. Back up paced at  45.  Pulmonary:      Effort: Pulmonary effort is normal. No respiratory distress.   Abdominal:      General: There is no distension.      Palpations: Abdomen is soft.   Genitourinary:     Comments: Yin in place  Musculoskeletal:      Right lower leg: No edema.      Left lower leg: No edema.      Comments: L radial arterial line   Skin:     General: Skin is warm and dry.      Capillary Refill: Capillary refill takes less than 2 seconds.   Neurological:      General: No focal deficit present.      Mental Status: She is alert and oriented to person, place, and time.       Vents:  Vent Mode: Spont (08/11/22 0557)  Ventilator Initiated: Yes (08/10/22 1600)  Set Rate: 14 BPM (08/11/22 0335)  Vt Set: 450 mL (08/11/22 0335)  Pressure Support: 10 cmH20 (08/11/22 0557)  PEEP/CPAP: 5 cmH20 (08/11/22 0557)  Oxygen Concentration (%): 30 (08/11/22 0600)  Peak Airway Pressure: 16 cmH2O (08/11/22 0557)  Plateau Pressure: 18 cmH20 (08/11/22 0557)  Total Ve: 9.9 mL (08/11/22 0557)  Negative Inspiratory Force (cm H2O): -37 (08/11/22 0557)  F/VT Ratio<105 (RSBI): (!) 21.65 (08/11/22 0557)    Lines/Drains/Airways       Central Venous Catheter Line  Duration              Introducer with Double Lumen 08/10/22 0848 1 day    Introducer 08/10/22 0848 1 day              Drain  Duration                  Chest Tube 08/10/22 1440 1 Right Mediastinal 19 Fr. 1 day         Chest Tube 08/10/22 1441 2 Left Mediastinal 19 Fr. 1 day         Chest Tube 08/10/22 1441 3 Right Pleural 19 Fr. 1 day         Urethral Catheter 08/10/22 0847 Straight-tip;Temperature probe;Non-latex 14 Fr. 1 day              Arterial Line  Duration             Arterial Line 08/10/22 0843 Left Radial 1 day              Line  Duration                  Pacer Wires 08/10/22 1430 1 day                    Significant Labs:    CBC/Anemia Profile:  Recent Labs   Lab 08/10/22  1603 08/10/22  1608 08/11/22  0432 08/11/22  0554 08/12/22  0341   WBC 17.10*  --  7.71  --  10.84   HGB 10.5*   --  9.4*  --  9.4*   HCT 32.0*   < > 29.1* 31* 28.4*   *  --  86*  --  81*   MCV 76*  --  75*  --  73*   RDW 14.4  --  14.1  --  14.7*    < > = values in this interval not displayed.        Chemistries:  Recent Labs   Lab 08/10/22  1603 08/10/22  2007 08/11/22  0432 08/11/22  0807 08/11/22 2021 08/12/22  0341     --  141  --   --  140   K 2.8*   < > 4.6 4.4 3.8 3.5   *  --  113*  --   --  106   CO2 18*  --  18*  --   --  23   BUN 10  --  10  --   --  11   CREATININE 0.9  --  0.9  --   --  1.1   CALCIUM 6.9*  --  8.3*  --   --  8.4*   ALBUMIN 1.9*  --  3.5  --   --  3.1*   PROT 4.3*  --  5.3*  --   --  5.5*   BILITOT 1.1*  --  0.9  --   --  0.8   ALKPHOS 108  --  95  --   --  124   ALT 96*  --  65*  --   --  32   *  --  231*  --   --  130*   MG 2.3  2.3  --  2.3  --   --  1.9   PHOS 2.9  2.9  --  3.2  --   --  2.9    < > = values in this interval not displayed.       ABGs:   Recent Labs   Lab 08/11/22  0554   PH 7.384   PCO2 35.0   HCO3 20.9*   POCSATURATED 99   BE -4     Coagulation:   Recent Labs   Lab 08/12/22  0341   INR 1.1   APTT 29.6     All pertinent labs within the past 24 hours have been reviewed.    Significant Imaging:  I have reviewed all pertinent imaging results/findings within the past 24 hours.    Assessment/Plan:     * Nonrheumatic mitral valve regurgitation  61 y.o. female with chronic diastolic HF (EF 60%), pAF (on Eliquis), HTN, HLD, obesity s/p gastric sleeve (2017) now s/p MVR, TVR, and MAZE on 8/10/22.      Neuro/Psych:   -- Sedation: none    -- Pain: PRN Oxy + Dilaudid  -- Scheduled Tylenol             Cards:   -- S/P MVR, TVR, MAZE on 8/10/22  -- HDS without inotropic or vasopressor support.  -- Ventricular pacing wires. Backup paced at 45.  -- MAP > 60-80, Syst < 140  -- Cleviprex PRN  -- Aspirin 325mg       Pulm:   -- Goal O2 sat > 90%  -- ABG PRN  -- Mediastinal chest tubes x2 and pleural chest tube x1 to suction, plan to remove today      Renal:  --  Remove wang today  -- Trend BUN/Cr   -- 40 lasix BID      FEN / GI:   -- Replace lytes as needed  -- Nutrition: Clears, advance to cardiac 1500 mL fluid restriction  -- GI ppx: famotidine  -- Bowel reg: miralax  -- 5% Albumin PRN      ID:   -- Tm: afebrile; WBC stable  -- Autumn-op ancef complete      Heme/Onc:   -- H/H stable   -- Daily CBC  -- 150mL Cell saver given back  -- Post-op coags wnl  -- Aspirin 325mg QD      Endo:   -- BG goal 140-180  -- SSI      PPx:   Feeding: Clears  Analgesia/Sedation: none / PRN Oxy + Dilaudid  Thromboembolic prevention: SCDs  HOB >30: Yes  Stress Ulcer ppx: famotidine  Glucose control: Critical care goal 140-180 g/dl, ISS     Lines/Drains/Airway: CVC RIJ, Wang, Chest tubes x 3, ventricular pacing wires, L radial A-line      Dispo/Code Status/Palliative:   -- SICU / Full Code.       Mindi Thayer MD  Critical Care - Surgery  [facility]

## 2022-08-13 LAB
ALBUMIN SERPL BCP-MCNC: 2.9 G/DL (ref 3.5–5.2)
ALP SERPL-CCNC: 137 U/L (ref 55–135)
ALT SERPL W/O P-5'-P-CCNC: 18 U/L (ref 10–44)
ANION GAP SERPL CALC-SCNC: 11 MMOL/L (ref 8–16)
APTT BLDCRRT: 31.9 SEC (ref 21–32)
AST SERPL-CCNC: 88 U/L (ref 10–40)
BASOPHILS # BLD AUTO: 0.04 K/UL (ref 0–0.2)
BASOPHILS NFR BLD: 0.3 % (ref 0–1.9)
BILIRUB SERPL-MCNC: 1.2 MG/DL (ref 0.1–1)
BUN SERPL-MCNC: 17 MG/DL (ref 8–23)
CALCIUM SERPL-MCNC: 8.6 MG/DL (ref 8.7–10.5)
CHLORIDE SERPL-SCNC: 103 MMOL/L (ref 95–110)
CO2 SERPL-SCNC: 25 MMOL/L (ref 23–29)
CREAT SERPL-MCNC: 1.1 MG/DL (ref 0.5–1.4)
DIFFERENTIAL METHOD: ABNORMAL
EOSINOPHIL # BLD AUTO: 0 K/UL (ref 0–0.5)
EOSINOPHIL NFR BLD: 0.1 % (ref 0–8)
ERYTHROCYTE [DISTWIDTH] IN BLOOD BY AUTOMATED COUNT: 14.1 % (ref 11.5–14.5)
EST. GFR  (NO RACE VARIABLE): 56.5 ML/MIN/1.73 M^2
GLUCOSE SERPL-MCNC: 108 MG/DL (ref 70–110)
HCT VFR BLD AUTO: 28.2 % (ref 37–48.5)
HGB BLD-MCNC: 9.2 G/DL (ref 12–16)
IMM GRANULOCYTES # BLD AUTO: 0.04 K/UL (ref 0–0.04)
IMM GRANULOCYTES NFR BLD AUTO: 0.3 % (ref 0–0.5)
INR PPP: 1.1 (ref 0.8–1.2)
LYMPHOCYTES # BLD AUTO: 1.4 K/UL (ref 1–4.8)
LYMPHOCYTES NFR BLD: 11.5 % (ref 18–48)
MAGNESIUM SERPL-MCNC: 1.8 MG/DL (ref 1.6–2.6)
MCH RBC QN AUTO: 24.8 PG (ref 27–31)
MCHC RBC AUTO-ENTMCNC: 32.6 G/DL (ref 32–36)
MCV RBC AUTO: 76 FL (ref 82–98)
MONOCYTES # BLD AUTO: 1.2 K/UL (ref 0.3–1)
MONOCYTES NFR BLD: 9.8 % (ref 4–15)
NEUTROPHILS # BLD AUTO: 9.6 K/UL (ref 1.8–7.7)
NEUTROPHILS NFR BLD: 78 % (ref 38–73)
NRBC BLD-RTO: 0 /100 WBC
PHOSPHATE SERPL-MCNC: 2.6 MG/DL (ref 2.7–4.5)
PLATELET # BLD AUTO: 90 K/UL (ref 150–450)
PMV BLD AUTO: 13.7 FL (ref 9.2–12.9)
POCT GLUCOSE: 123 MG/DL (ref 70–110)
POTASSIUM SERPL-SCNC: 3.4 MMOL/L (ref 3.5–5.1)
POTASSIUM SERPL-SCNC: 3.7 MMOL/L (ref 3.5–5.1)
PROT SERPL-MCNC: 5.7 G/DL (ref 6–8.4)
PROTHROMBIN TIME: 11.6 SEC (ref 9–12.5)
RBC # BLD AUTO: 3.71 M/UL (ref 4–5.4)
SODIUM SERPL-SCNC: 139 MMOL/L (ref 136–145)
WBC # BLD AUTO: 12.27 K/UL (ref 3.9–12.7)

## 2022-08-13 PROCEDURE — 99232 SBSQ HOSP IP/OBS MODERATE 35: CPT | Mod: ,,, | Performed by: ANESTHESIOLOGY

## 2022-08-13 PROCEDURE — 99232 PR SUBSEQUENT HOSPITAL CARE,LEVL II: ICD-10-PCS | Mod: ,,, | Performed by: ANESTHESIOLOGY

## 2022-08-13 PROCEDURE — 25000003 PHARM REV CODE 250: Performed by: STUDENT IN AN ORGANIZED HEALTH CARE EDUCATION/TRAINING PROGRAM

## 2022-08-13 PROCEDURE — 63600175 PHARM REV CODE 636 W HCPCS: Performed by: STUDENT IN AN ORGANIZED HEALTH CARE EDUCATION/TRAINING PROGRAM

## 2022-08-13 PROCEDURE — 20600001 HC STEP DOWN PRIVATE ROOM

## 2022-08-13 PROCEDURE — 94761 N-INVAS EAR/PLS OXIMETRY MLT: CPT

## 2022-08-13 PROCEDURE — 25000003 PHARM REV CODE 250

## 2022-08-13 PROCEDURE — 63600175 PHARM REV CODE 636 W HCPCS

## 2022-08-13 PROCEDURE — 83735 ASSAY OF MAGNESIUM: CPT | Performed by: PHYSICIAN ASSISTANT

## 2022-08-13 PROCEDURE — 84132 ASSAY OF SERUM POTASSIUM: CPT | Performed by: PHYSICIAN ASSISTANT

## 2022-08-13 PROCEDURE — 84100 ASSAY OF PHOSPHORUS: CPT | Performed by: PHYSICIAN ASSISTANT

## 2022-08-13 PROCEDURE — 85025 COMPLETE CBC W/AUTO DIFF WBC: CPT | Performed by: PHYSICIAN ASSISTANT

## 2022-08-13 PROCEDURE — 25000003 PHARM REV CODE 250: Performed by: PHYSICIAN ASSISTANT

## 2022-08-13 PROCEDURE — 85610 PROTHROMBIN TIME: CPT | Performed by: PHYSICIAN ASSISTANT

## 2022-08-13 PROCEDURE — 36415 COLL VENOUS BLD VENIPUNCTURE: CPT | Performed by: PHYSICIAN ASSISTANT

## 2022-08-13 PROCEDURE — 85730 THROMBOPLASTIN TIME PARTIAL: CPT | Performed by: PHYSICIAN ASSISTANT

## 2022-08-13 PROCEDURE — 80053 COMPREHEN METABOLIC PANEL: CPT | Performed by: STUDENT IN AN ORGANIZED HEALTH CARE EDUCATION/TRAINING PROGRAM

## 2022-08-13 RX ORDER — LANOLIN ALCOHOL/MO/W.PET/CERES
400 CREAM (GRAM) TOPICAL ONCE
Status: COMPLETED | OUTPATIENT
Start: 2022-08-13 | End: 2022-08-13

## 2022-08-13 RX ORDER — AMLODIPINE BESYLATE 5 MG/1
5 TABLET ORAL DAILY
Status: DISCONTINUED | OUTPATIENT
Start: 2022-08-13 | End: 2022-08-16

## 2022-08-13 RX ORDER — FUROSEMIDE 10 MG/ML
80 INJECTION INTRAMUSCULAR; INTRAVENOUS ONCE
Status: COMPLETED | OUTPATIENT
Start: 2022-08-13 | End: 2022-08-13

## 2022-08-13 RX ORDER — FUROSEMIDE 10 MG/ML
80 INJECTION INTRAMUSCULAR; INTRAVENOUS 2 TIMES DAILY
Status: DISCONTINUED | OUTPATIENT
Start: 2022-08-13 | End: 2022-08-16

## 2022-08-13 RX ORDER — POTASSIUM CHLORIDE 20 MEQ/1
20 TABLET, EXTENDED RELEASE ORAL 2 TIMES DAILY
Status: CANCELLED | OUTPATIENT
Start: 2022-08-13 | End: 2022-08-16

## 2022-08-13 RX ADMIN — ACETAMINOPHEN 650 MG: 325 TABLET ORAL at 06:08

## 2022-08-13 RX ADMIN — OXYCODONE 5 MG: 5 TABLET ORAL at 09:08

## 2022-08-13 RX ADMIN — ACETAMINOPHEN 650 MG: 325 TABLET ORAL at 03:08

## 2022-08-13 RX ADMIN — FAMOTIDINE 20 MG: 20 TABLET ORAL at 08:08

## 2022-08-13 RX ADMIN — OXYCODONE 5 MG: 5 TABLET ORAL at 04:08

## 2022-08-13 RX ADMIN — DOCUSATE SODIUM 100 MG: 100 CAPSULE ORAL at 09:08

## 2022-08-13 RX ADMIN — MUPIROCIN 1 G: 20 OINTMENT TOPICAL at 09:08

## 2022-08-13 RX ADMIN — FUROSEMIDE 80 MG: 10 INJECTION, SOLUTION INTRAMUSCULAR; INTRAVENOUS at 11:08

## 2022-08-13 RX ADMIN — FAMOTIDINE 20 MG: 20 TABLET ORAL at 09:08

## 2022-08-13 RX ADMIN — POTASSIUM BICARBONATE 50 MEQ: 978 TABLET, EFFERVESCENT ORAL at 06:08

## 2022-08-13 RX ADMIN — POLYETHYLENE GLYCOL 3350 17 G: 17 POWDER, FOR SOLUTION ORAL at 09:08

## 2022-08-13 RX ADMIN — MUPIROCIN 1 G: 20 OINTMENT TOPICAL at 08:08

## 2022-08-13 RX ADMIN — FUROSEMIDE 80 MG: 10 INJECTION, SOLUTION INTRAMUSCULAR; INTRAVENOUS at 08:08

## 2022-08-13 RX ADMIN — Medication 400 MG: at 06:08

## 2022-08-13 RX ADMIN — ASPIRIN 325 MG ORAL TABLET 325 MG: 325 PILL ORAL at 09:08

## 2022-08-13 RX ADMIN — OXYCODONE HYDROCHLORIDE 10 MG: 10 TABLET ORAL at 03:08

## 2022-08-13 RX ADMIN — ACETAMINOPHEN 650 MG: 325 TABLET ORAL at 09:08

## 2022-08-13 RX ADMIN — DOCUSATE SODIUM 100 MG: 100 CAPSULE ORAL at 08:08

## 2022-08-13 RX ADMIN — AMLODIPINE BESYLATE 5 MG: 5 TABLET ORAL at 09:08

## 2022-08-13 NOTE — SUBJECTIVE & OBJECTIVE
Interval History/Significant Events: Stripped drains with significant increase in serous chest tube output. No acute events overnight. Hemodynamically stable without inotropic or vasopressor support. Satting well on RA.     Follow-up For: Procedure(s) (LRB):  VALVULOPLASTY,MITRAL VALVE (N/A)  REPAIR, TRICUSPID VALVE (N/A)  KIRKPATRICK MAZE PROCEDURE (N/A)    Post-Operative Day: 3 Days Post-Op    Objective:     Vital Signs (Most Recent):  Temp: 97.6 °F (36.4 °C) (08/13/22 0454)  Pulse: 79 (08/13/22 0500)  Resp: (!) 30 (08/13/22 0500)  BP: 118/75 (08/13/22 0500)  SpO2: 97 % (08/13/22 0500)   Vital Signs (24h Range):  Temp:  [97.6 °F (36.4 °C)-98.7 °F (37.1 °C)] 97.6 °F (36.4 °C)  Pulse:  [47-82] 79  Resp:  [19-37] 30  SpO2:  [91 %-99 %] 97 %  BP: (105-160)/() 118/75     Weight: 135.5 kg (298 lb 11.6 oz) [facility]  Body mass index is 49.71 kg/m².      Intake/Output Summary (Last 24 hours) at 8/13/2022 0637  Last data filed at 8/13/2022 0400  Gross per 24 hour   Intake 1038.57 ml   Output 2030 ml   Net -991.43 ml       Physical Exam  Constitutional:       General: She is not in acute distress.  HENT:      Head: Normocephalic and atraumatic.   Eyes:      Pupils: Pupils are equal, round, and reactive to light.   Cardiovascular:      Rate and Rhythm: Normal rate and regular rhythm.      Comments: Midline sternotomy c/d with dressing in place    2 mediastinal and 1 pleural chest tube to suction    V wires in place. Back up paced at 45.  Pulmonary:      Effort: Pulmonary effort is normal. No respiratory distress.   Abdominal:      General: There is no distension.      Palpations: Abdomen is soft.   Musculoskeletal:      Right lower leg: No edema.      Left lower leg: No edema.   Skin:     General: Skin is warm and dry.      Capillary Refill: Capillary refill takes less than 2 seconds.   Neurological:      General: No focal deficit present.      Mental Status: She is alert and oriented to person, place, and time.        Vents:  Vent Mode: Spont (08/11/22 0557)  Ventilator Initiated: Yes (08/10/22 1600)  Set Rate: 14 BPM (08/11/22 0335)  Vt Set: 450 mL (08/11/22 0335)  Pressure Support: 10 cmH20 (08/11/22 0557)  PEEP/CPAP: 5 cmH20 (08/11/22 0557)  Oxygen Concentration (%): 30 (08/11/22 0600)  Peak Airway Pressure: 16 cmH2O (08/11/22 0557)  Plateau Pressure: 18 cmH20 (08/11/22 0557)  Total Ve: 9.9 mL (08/11/22 0557)  Negative Inspiratory Force (cm H2O): -37 (08/11/22 0557)  F/VT Ratio<105 (RSBI): (!) 21.65 (08/11/22 0557)    Lines/Drains/Airways       Drain  Duration                  Chest Tube 08/10/22 1440 1 Right Mediastinal 19 Fr. 2 days         Chest Tube 08/10/22 1441 2 Left Mediastinal 19 Fr. 2 days         Chest Tube 08/10/22 1441 3 Right Pleural 19 Fr. 2 days              Line  Duration                  Pacer Wires 08/10/22 1430 2 days              Peripheral Intravenous Line  Duration                  Peripheral IV - Single Lumen 18 G Right Antecubital -- days         Peripheral IV - Single Lumen 08/12/22 20 G Left Antecubital 1 day                    Significant Labs:    CBC/Anemia Profile:  Recent Labs   Lab 08/12/22  0341 08/13/22  0353   WBC 10.84 12.27   HGB 9.4* 9.2*   HCT 28.4* 28.2*   PLT 81* 90*   MCV 73* 76*   RDW 14.7* 14.1        Chemistries:  Recent Labs   Lab 08/12/22  0341 08/12/22  1157 08/12/22  2030 08/13/22  0353     --   --  139   K 3.5 3.6 3.9 3.7     --   --  103   CO2 23  --   --  25   BUN 11  --   --  17   CREATININE 1.1  --   --  1.1   CALCIUM 8.4*  --   --  8.6*   ALBUMIN 3.1*  --   --  2.9*   PROT 5.5*  --   --  5.7*   BILITOT 0.8  --   --  1.2*   ALKPHOS 124  --   --  137*   ALT 32  --   --  18   *  --   --  88*   MG 1.9  --   --  1.8   PHOS 2.9  --   --  2.6*       Coagulation:   Recent Labs   Lab 08/12/22  0341   INR 1.1   APTT 29.6     All pertinent labs within the past 24 hours have been reviewed.    Significant Imaging:  I have reviewed all pertinent imaging  results/findings within the past 24 hours.

## 2022-08-13 NOTE — PLAN OF CARE
SICU PLAN OF CARE NOTE    Dx: Nonrheumatic mitral valve regurgitation    Shift Events: No events overnight. Patient bradycardic on monitor high 40s-60s. V wires remain connected to pacer, set at 45. Patient remains on RA %. Patient OBBTC this AM with x2 RN assist. Voided on own post wang catheter removal. Pain controled with prn medication. POC discussed with patient throughout shift.     Goals of Care: MAP > 65, stepdown out of ICU     Neuro: AAO x4, Follows Commands, and Moves All Extremities      Respiratory: Room Air    Diet: Cardiac Diet      Urine Output: voided 400 over shift, post wang removal.     Drains:   CT meads: 90cc/shift  CT pleural: 0cc/shift        Labs/Accuchecks: daily labs,  ACHS accucheck.    Skin: No breakdown noted. Surgical DSGs removed this AM. Midsternal with steri strips and sutures, dry and approximated. DSg to Chest tubes changed, WNL. Foam padding in use. Waffle mattress inflated. Chair cushion in use at this time.

## 2022-08-13 NOTE — CARE UPDATE
BG goal 140-180    -A1C   Lab Results   Component Value Date    HGBA1C 5.2 08/09/2022       -HOME REGIMEN: none; no hx of DM     -INPATIENT REGIMEN: correction scale     -GLUCOSE TREND FOR THE PAST 24HRS: 112-156    -NO HYPOGYCEMIAS NOTED     -Diet: Diet Cardiac Fluid - 1500mL    -Steroids - none    -Tube Feeds - n/a      Remains in SICU. POD 3. BG well controlled with no prn SQ insulin requirements. Diet progressed.     Plan:  -Discontinue Moderate Dose Correction Scale and BG monitoring ac/hs  -Recommend continuing to monitor BG with am labs      Endocrine to sign off at this time given no hx of DM and no insulin needs inpatient. Please reconsult if needed.

## 2022-08-13 NOTE — PLAN OF CARE
Plan of Care Note  Cardiothoracic Surgery    61 y.o. female POD#3 s/p MVr, TVr, Maze    Plan: d/c chest tubes, lasix 80 BID, Norvasc, stepdown    Plan of care for patient was discussed with ICU staff including nurses, residents, and faculty and appropriate consulting services.    Will continue to monitor patient's hemodynamics, functionality, neuro status, fluid status and renal function, and labs and will adjust medications and fluids as necessary while monitoring appropriateness for de-escalation of support and monitoring and transport to stepdown unit.    Ethan Almonte MD  Cardiothoracic Surgery PGY6

## 2022-08-13 NOTE — PROGRESS NOTES
[facility]  Critical Care - Surgery  Progress Note    Patient Name: Magalys Bass  MRN: [id number]  Admission Date: 8/10/2022  Hospital Length of Stay: 3 days  Code Status: Full Code  Attending Provider: Manish Morris MD  Primary Care Provider: Yonathan Brewer MD   Principal Problem: Nonrheumatic mitral valve regurgitation    Subjective:     Hospital/ICU Course:  No notes on file    Interval History/Significant Events: Stripped drains with significant increase in serous chest tube output. No acute events overnight. Hemodynamically stable without inotropic or vasopressor support. Satting well on RA.     Follow-up For: Procedure(s) (LRB):  VALVULOPLASTY,MITRAL VALVE (N/A)  REPAIR, TRICUSPID VALVE (N/A)  KIRKPATRICK MAZE PROCEDURE (N/A)    Post-Operative Day: 3 Days Post-Op    Objective:     Vital Signs (Most Recent):  Temp: 97.6 °F (36.4 °C) (08/13/22 0454)  Pulse: 79 (08/13/22 0500)  Resp: (!) 30 (08/13/22 0500)  BP: 118/75 (08/13/22 0500)  SpO2: 97 % (08/13/22 0500)   Vital Signs (24h Range):  Temp:  [97.6 °F (36.4 °C)-98.7 °F (37.1 °C)] 97.6 °F (36.4 °C)  Pulse:  [47-82] 79  Resp:  [19-37] 30  SpO2:  [91 %-99 %] 97 %  BP: (105-160)/() 118/75     [facility]: 135.5 kg (298 lb 11.6 oz[facility]  Body mass index is 49.71 kg/m².      Intake/Output Summary (Last 24 hours) at 8/13/2022 0637  Last data filed at 8/13/2022 0400  Gross per 24 hour   Intake 1038.57 ml   Output 2030 ml   Net -991.43 ml       Physical Exam  Constitutional:       General: She is not in acute distress.  HENT:      Head: Normocephalic and atraumatic.   Eyes:      Pupils: Pupils are equal, round, and reactive to light.   Cardiovascular:      Rate and Rhythm: Normal rate and regular rhythm.      Comments: Midline sternotomy c/d with dressing in place    2 mediastinal and 1 pleural chest tube to suction    V wires in place. Back up paced at 45.  Pulmonary:      Effort: Pulmonary effort is normal. No respiratory distress.    Abdominal:      General: There is no distension.      Palpations: Abdomen is soft.   Musculoskeletal:      Right lower leg: No edema.      Left lower leg: No edema.   Skin:     General: Skin is warm and dry.      Capillary Refill: Capillary refill takes less than 2 seconds.   Neurological:      General: No focal deficit present.      Mental Status: She is alert and oriented to person, place, and time.       Vents:  Vent Mode: Spont (08/11/22 0557)  Ventilator Initiated: Yes (08/10/22 1600)  Set Rate: 14 BPM (08/11/22 0335)  Vt Set: 450 mL (08/11/22 0335)  Pressure Support: 10 cmH20 (08/11/22 0557)  PEEP/CPAP: 5 cmH20 (08/11/22 0557)  Oxygen Concentration (%): 30 (08/11/22 0600)  Peak Airway Pressure: 16 cmH2O (08/11/22 0557)  Plateau Pressure: 18 cmH20 (08/11/22 0557)  Total Ve: 9.9 mL (08/11/22 0557)  Negative Inspiratory Force (cm H2O): -37 (08/11/22 0557)  F/VT Ratio<105 (RSBI): (!) 21.65 (08/11/22 0557)    Lines/Drains/Airways       Drain  Duration                  Chest Tube 08/10/22 1440 1 Right Mediastinal 19 Fr. 2 days         Chest Tube 08/10/22 1441 2 Left Mediastinal 19 Fr. 2 days         Chest Tube 08/10/22 1441 3 Right Pleural 19 Fr. 2 days              Line  Duration                  Pacer Wires 08/10/22 1430 2 days              Peripheral Intravenous Line  Duration                  Peripheral IV - Single Lumen 18 G Right Antecubital -- days         Peripheral IV - Single Lumen 08/12/22 20 G Left Antecubital 1 day                    Significant Labs:    CBC/Anemia Profile:  Recent Labs   Lab 08/12/22  0341 08/13/22  0353   WBC 10.84 12.27   HGB 9.4* 9.2*   HCT 28.4* 28.2*   PLT 81* 90*   MCV 73* 76*   RDW 14.7* 14.1        Chemistries:  Recent Labs   Lab 08/12/22  0341 08/12/22  1157 08/12/22  2030 08/13/22  0353     --   --  139   K 3.5 3.6 3.9 3.7     --   --  103   CO2 23  --   --  25   BUN 11  --   --  17   CREATININE 1.1  --   --  1.1   CALCIUM 8.4*  --   --  8.6*   ALBUMIN 3.1*  --    --  2.9*   PROT 5.5*  --   --  5.7*   BILITOT 0.8  --   --  1.2*   ALKPHOS 124  --   --  137*   ALT 32  --   --  18   *  --   --  88*   MG 1.9  --   --  1.8   PHOS 2.9  --   --  2.6*       Coagulation:   Recent Labs   Lab 08/12/22  0341   INR 1.1   APTT 29.6     All pertinent labs within the past 24 hours have been reviewed.    Significant Imaging:  I have reviewed all pertinent imaging results/findings within the past 24 hours.    Assessment/Plan:     * Nonrheumatic mitral valve regurgitation  61 y.o. female with chronic diastolic HF (EF 60%), pAF (on Eliquis), HTN, HLD, obesity s/p gastric sleeve (2017) now s/p MVR, TVR, and MAZE on 8/10/22.      Neuro/Psych:   -- Sedation: none    -- Pain: PRN Oxy + Dilaudid  -- Scheduled Tylenol             Cards:   -- S/P MVR, TVR, MAZE on 8/10/22  -- HDS without inotropic or vasopressor support.  -- Ventricular pacing wires. Backup paced at 45.  -- MAP > 60-80, Syst < 140  -- Cleviprex PRN, starting home amlodipine  -- Aspirin 325mg   -- Consider starting BB      Pulm:   -- Goal O2 sat > 90%  -- ABG PRN  -- Mediastinal chest tubes x2 and pleural chest tube x1 to suction, plan to remove pleural today      Renal:  -- Trend BUN/Cr   -- 40 lasix BID      FEN / GI:   -- Replace lytes as needed  -- Nutrition: Cardiac 1500 mL fluid restriction  -- GI ppx: famotidine  -- Bowel reg: miralax  -- 5% Albumin PRN      ID:   -- Tm: afebrile; WBC stable  -- Autumn-op ancef complete      Heme/Onc:   -- H/H stable   -- Daily CBC  -- 150mL Cell saver given back  -- Post-op coags wnl  -- Aspirin 325mg QD      Endo:   -- BG goal 140-180  -- SSI      PPx:   Feeding: Cardiac  Analgesia/Sedation: none / PRN Oxy + Dilaudid  Thromboembolic prevention: SCDs  HOB >30: Yes  Stress Ulcer ppx: famotidine  Glucose control: Critical care goal 140-180 g/dl, ISS     Lines/Drains/Airway: Chest tubes x 3, ventricular pacing wires,      Dispo/Code Status/Palliative:   -- SICU, possible step down  today / Full Code.          Mindi Thayer MD  Critical Care - Surgery  [facility]

## 2022-08-13 NOTE — ASSESSMENT & PLAN NOTE
61 y.o. female with chronic diastolic HF (EF 60%), pAF (on Eliquis), HTN, HLD, obesity s/p gastric sleeve (2017) now s/p MVR, TVR, and MAZE on 8/10/22.      Neuro/Psych:   -- Sedation: none    -- Pain: PRN Oxy + Dilaudid  -- Scheduled Tylenol             Cards:   -- S/P MVR, TVR, MAZE on 8/10/22  -- HDS without inotropic or vasopressor support.  -- Ventricular pacing wires. Backup paced at 45.  -- MAP > 60-80, Syst < 140  -- Cleviprex PRN, starting home amlodipine  -- Aspirin 325mg   -- Consider starting BB      Pulm:   -- Goal O2 sat > 90%  -- ABG PRN  -- Mediastinal chest tubes x2 and pleural chest tube x1 to suction, plan to remove pleural today      Renal:  -- Trend BUN/Cr   -- 40 lasix BID      FEN / GI:   -- Replace lytes as needed  -- Nutrition: Cardiac 1500 mL fluid restriction  -- GI ppx: famotidine  -- Bowel reg: miralax  -- 5% Albumin PRN      ID:   -- Tm: afebrile; WBC stable  -- Autumn-op ancef complete      Heme/Onc:   -- H/H stable   -- Daily CBC  -- 150mL Cell saver given back  -- Post-op coags wnl  -- Aspirin 325mg QD      Endo:   -- BG goal 140-180  -- SSI      PPx:   Feeding: Cardiac  Analgesia/Sedation: none / PRN Oxy + Dilaudid  Thromboembolic prevention: SCDs  HOB >30: Yes  Stress Ulcer ppx: famotidine  Glucose control: Critical care goal 140-180 g/dl, ISS     Lines/Drains/Airway: Chest tubes x 3, ventricular pacing wires,      Dispo/Code Status/Palliative:   -- SICU, possible step down today / Full Code.

## 2022-08-14 LAB
ALBUMIN SERPL BCP-MCNC: 2.9 G/DL (ref 3.5–5.2)
ALP SERPL-CCNC: 157 U/L (ref 55–135)
ALT SERPL W/O P-5'-P-CCNC: 12 U/L (ref 10–44)
ANION GAP SERPL CALC-SCNC: 13 MMOL/L (ref 8–16)
APTT BLDCRRT: 26.9 SEC (ref 21–32)
AST SERPL-CCNC: 60 U/L (ref 10–40)
BASOPHILS # BLD AUTO: 0.03 K/UL (ref 0–0.2)
BASOPHILS NFR BLD: 0.3 % (ref 0–1.9)
BILIRUB SERPL-MCNC: 1.4 MG/DL (ref 0.1–1)
BUN SERPL-MCNC: 17 MG/DL (ref 8–23)
CALCIUM SERPL-MCNC: 8.9 MG/DL (ref 8.7–10.5)
CHLORIDE SERPL-SCNC: 97 MMOL/L (ref 95–110)
CO2 SERPL-SCNC: 29 MMOL/L (ref 23–29)
CREAT SERPL-MCNC: 0.9 MG/DL (ref 0.5–1.4)
DIFFERENTIAL METHOD: ABNORMAL
EOSINOPHIL # BLD AUTO: 0.1 K/UL (ref 0–0.5)
EOSINOPHIL NFR BLD: 0.8 % (ref 0–8)
ERYTHROCYTE [DISTWIDTH] IN BLOOD BY AUTOMATED COUNT: 13.6 % (ref 11.5–14.5)
EST. GFR  (NO RACE VARIABLE): >60 ML/MIN/1.73 M^2
GLUCOSE SERPL-MCNC: 97 MG/DL (ref 70–110)
HCT VFR BLD AUTO: 29.8 % (ref 37–48.5)
HGB BLD-MCNC: 9.8 G/DL (ref 12–16)
IMM GRANULOCYTES # BLD AUTO: 0.04 K/UL (ref 0–0.04)
IMM GRANULOCYTES NFR BLD AUTO: 0.4 % (ref 0–0.5)
INR PPP: 1.1 (ref 0.8–1.2)
LYMPHOCYTES # BLD AUTO: 1 K/UL (ref 1–4.8)
LYMPHOCYTES NFR BLD: 9.8 % (ref 18–48)
MAGNESIUM SERPL-MCNC: 1.7 MG/DL (ref 1.6–2.6)
MCH RBC QN AUTO: 24 PG (ref 27–31)
MCHC RBC AUTO-ENTMCNC: 32.9 G/DL (ref 32–36)
MCV RBC AUTO: 73 FL (ref 82–98)
MONOCYTES # BLD AUTO: 0.9 K/UL (ref 0.3–1)
MONOCYTES NFR BLD: 9.3 % (ref 4–15)
NEUTROPHILS # BLD AUTO: 7.9 K/UL (ref 1.8–7.7)
NEUTROPHILS NFR BLD: 79.4 % (ref 38–73)
NRBC BLD-RTO: 1 /100 WBC
PHOSPHATE SERPL-MCNC: 2.9 MG/DL (ref 2.7–4.5)
PLATELET # BLD AUTO: 102 K/UL (ref 150–450)
PMV BLD AUTO: 11.9 FL (ref 9.2–12.9)
POTASSIUM SERPL-SCNC: 3.4 MMOL/L (ref 3.5–5.1)
POTASSIUM SERPL-SCNC: 4 MMOL/L (ref 3.5–5.1)
PROT SERPL-MCNC: 6.2 G/DL (ref 6–8.4)
PROTHROMBIN TIME: 11 SEC (ref 9–12.5)
RBC # BLD AUTO: 4.09 M/UL (ref 4–5.4)
SODIUM SERPL-SCNC: 139 MMOL/L (ref 136–145)
WBC # BLD AUTO: 9.9 K/UL (ref 3.9–12.7)

## 2022-08-14 PROCEDURE — 25000003 PHARM REV CODE 250: Performed by: STUDENT IN AN ORGANIZED HEALTH CARE EDUCATION/TRAINING PROGRAM

## 2022-08-14 PROCEDURE — 84100 ASSAY OF PHOSPHORUS: CPT | Performed by: PHYSICIAN ASSISTANT

## 2022-08-14 PROCEDURE — 36415 COLL VENOUS BLD VENIPUNCTURE: CPT | Performed by: PHYSICIAN ASSISTANT

## 2022-08-14 PROCEDURE — 85610 PROTHROMBIN TIME: CPT | Performed by: PHYSICIAN ASSISTANT

## 2022-08-14 PROCEDURE — 97110 THERAPEUTIC EXERCISES: CPT | Mod: CQ

## 2022-08-14 PROCEDURE — 25000003 PHARM REV CODE 250

## 2022-08-14 PROCEDURE — 94799 UNLISTED PULMONARY SVC/PX: CPT

## 2022-08-14 PROCEDURE — 83735 ASSAY OF MAGNESIUM: CPT | Performed by: PHYSICIAN ASSISTANT

## 2022-08-14 PROCEDURE — 63600175 PHARM REV CODE 636 W HCPCS

## 2022-08-14 PROCEDURE — 80053 COMPREHEN METABOLIC PANEL: CPT | Performed by: STUDENT IN AN ORGANIZED HEALTH CARE EDUCATION/TRAINING PROGRAM

## 2022-08-14 PROCEDURE — 85730 THROMBOPLASTIN TIME PARTIAL: CPT | Performed by: PHYSICIAN ASSISTANT

## 2022-08-14 PROCEDURE — 84132 ASSAY OF SERUM POTASSIUM: CPT | Performed by: PHYSICIAN ASSISTANT

## 2022-08-14 PROCEDURE — 85025 COMPLETE CBC W/AUTO DIFF WBC: CPT | Performed by: PHYSICIAN ASSISTANT

## 2022-08-14 PROCEDURE — 20600001 HC STEP DOWN PRIVATE ROOM

## 2022-08-14 PROCEDURE — 94761 N-INVAS EAR/PLS OXIMETRY MLT: CPT

## 2022-08-14 PROCEDURE — 25000003 PHARM REV CODE 250: Performed by: PHYSICIAN ASSISTANT

## 2022-08-14 PROCEDURE — 97116 GAIT TRAINING THERAPY: CPT | Mod: CQ

## 2022-08-14 RX ORDER — POTASSIUM CHLORIDE 20 MEQ/1
20 TABLET, EXTENDED RELEASE ORAL 2 TIMES DAILY
Status: DISCONTINUED | OUTPATIENT
Start: 2022-08-14 | End: 2022-08-15

## 2022-08-14 RX ADMIN — POTASSIUM CHLORIDE 20 MEQ: 1500 TABLET, EXTENDED RELEASE ORAL at 08:08

## 2022-08-14 RX ADMIN — ACETAMINOPHEN 650 MG: 325 TABLET ORAL at 05:08

## 2022-08-14 RX ADMIN — FAMOTIDINE 20 MG: 20 TABLET ORAL at 08:08

## 2022-08-14 RX ADMIN — ACETAMINOPHEN 650 MG: 325 TABLET ORAL at 08:08

## 2022-08-14 RX ADMIN — ACETAMINOPHEN 650 MG: 325 TABLET ORAL at 01:08

## 2022-08-14 RX ADMIN — ASPIRIN 325 MG ORAL TABLET 325 MG: 325 PILL ORAL at 08:08

## 2022-08-14 RX ADMIN — AMLODIPINE BESYLATE 5 MG: 5 TABLET ORAL at 08:08

## 2022-08-14 RX ADMIN — FUROSEMIDE 80 MG: 10 INJECTION, SOLUTION INTRAMUSCULAR; INTRAVENOUS at 08:08

## 2022-08-14 RX ADMIN — MUPIROCIN 1 G: 20 OINTMENT TOPICAL at 08:08

## 2022-08-14 RX ADMIN — OXYCODONE HYDROCHLORIDE 10 MG: 10 TABLET ORAL at 08:08

## 2022-08-14 RX ADMIN — DOCUSATE SODIUM 100 MG: 100 CAPSULE ORAL at 08:08

## 2022-08-14 RX ADMIN — OXYCODONE HYDROCHLORIDE 10 MG: 10 TABLET ORAL at 09:08

## 2022-08-14 NOTE — PROGRESS NOTES
[facility] - [facility]  Cardiothoracic Surgery  Progress Note    Patient Name: Magalys Bass  MRN: [id number]  Admission Date: 8/10/2022  Hospital Length of Stay: 4 days  Code Status: Full Code   Attending Physician: Manish Morris MD   Referring Provider: Manish Morris MD  Principal Problem:Nonrheumatic mitral valve regurgitation    Subjective:     Post-Op Info:  Procedure(s) (LRB):  VALVULOPLASTY,MITRAL VALVE (N/A)  REPAIR, TRICUSPID VALVE (N/A)  KIRKPATRICK MAZE PROCEDURE (N/A)   4 Days Post-Op     Interval History:  Out of ICU  Did well overnight  On RA    Review of Systems   Constitutional: Negative.   HENT: Negative.     Eyes: Negative.    Cardiovascular:  Positive for chest pain.   Respiratory: Negative.  Negative for cough.    Endocrine: Negative.    Skin: Negative.    Musculoskeletal: Negative.    Gastrointestinal: Negative.    Genitourinary: Negative.    Neurological: Negative.    Psychiatric/Behavioral: Negative.     Medications:  Continuous Infusions:  Scheduled Meds:   acetaminophen  650 mg Oral Q8H    amLODIPine  5 mg Oral Daily    aspirin  325 mg Oral Daily    docusate sodium  100 mg Oral BID    famotidine  20 mg Oral BID    furosemide (LASIX) injection  80 mg Intravenous BID    mupirocin  1 g Nasal BID    polyethylene glycol  17 g Oral Daily    potassium chloride  20 mEq Oral BID     PRN Meds:aspirin, bisacodyL, calcium gluconate IVPB, calcium gluconate IVPB, calcium gluconate IVPB, dextrose 10%, dextrose 10%, HYDROmorphone, magnesium sulfate IVPB, magnesium sulfate IVPB, ondansetron, oxyCODONE, oxyCODONE, potassium bicarbonate, potassium bicarbonate, potassium bicarbonate, potassium, sodium phosphates, potassium, sodium phosphates, potassium, sodium phosphates, sodium chloride 0.9%     Objective:     Vital Signs (Most Recent):  Temp: 98 °F (36.7 °C) (08/14/22 0744)  Pulse: 76 (08/14/22 0744)  Resp: 18 (08/14/22 0825)  BP: 99/65 (08/14/22 0744)  SpO2: 96 % (08/14/22 0744)  Vital Signs (24h Range):  Temp:  [96.5 °F (35.8 °C)-98.9 °F (37.2 °C)] 98 °F (36.7 °C)  Pulse:  [49-94] 76  Resp:  [12-32] 18  SpO2:  [93 %-99 %] 96 %  BP: ()/(54-83) 99/65     Weight: 128.3 kg (282 lb 13.6 oz)  Body mass index is 47.07 kg/m².    SpO2: 96 %  O2 Device (Oxygen Therapy): room air    Intake/Output - Last 3 Shifts         08/12 0700  08/13 0659 08/13 0700  08/14 0659 08/14 0700  08/15 0659    P.O. 840      I.V. (mL/kg)       IV Piggyback 198.6      Total Intake(mL/kg) 1038.6 (7.7)      Urine (mL/kg/hr) 1175 (0.4) 400 (0.1)     Stool 0 0     Chest Tube 855 170     Total Output 2030 570     Net -991.4 -570            Urine Occurrence  2 x     Stool Occurrence 0 x 2 x             Lines/Drains/Airways       Drain  Duration                  Chest Tube 08/10/22 1440 1 Right Mediastinal 19 Fr. 3 days         Chest Tube 08/10/22 1441 2 Left Mediastinal 19 Fr. 3 days         Chest Tube 08/10/22 1441 3 Right Pleural 19 Fr. 3 days              Line  Duration                  Pacer Wires 08/10/22 1430 3 days              Peripheral Intravenous Line  Duration                  Peripheral IV - Single Lumen 18 G Right Antecubital -- days         Peripheral IV - Single Lumen 08/12/22 20 G Left Antecubital 2 days                    Physical Exam  Constitutional:       General: She is not in acute distress.  HENT:      Head: Normocephalic and atraumatic.   Eyes:      Pupils: Pupils are equal, round, and reactive to light.   Cardiovascular:      Rate and Rhythm: Normal rate and regular rhythm.      Comments: Midline sternotomy c/d with dressing in place    2 mediastinal and 1 pleural chest tube to suction    V wires in place. Back up paced at 45.  Pulmonary:      Effort: Pulmonary effort is normal. No respiratory distress.   Abdominal:      General: There is no distension.      Palpations: Abdomen is soft.   Musculoskeletal:      Right lower leg: No edema.      Left lower leg: No edema.   Skin:     General: Skin is  warm and dry.      Capillary Refill: Capillary refill takes less than 2 seconds.   Neurological:      General: No focal deficit present.      Mental Status: She is alert and oriented to person, place, and time.       Significant Labs:  All pertinent labs from the last 24 hours have been reviewed.    Significant Diagnostics:  I have reviewed all pertinent imaging results/findings within the past 24 hours.    Assessment/Plan:     * Nonrheumatic mitral valve regurgitation  61 y.o. female POD#4 s/p MVr, TVr, Maze    D/c chest tubes  Keep wires for now. Can disconnect pacer  Lasix 80 BID  ASA, Statin  Cardiac diet   Ambulate         Ethan Almonte MD  Cardiothoracic Surgery  [facility]

## 2022-08-14 NOTE — SUBJECTIVE & OBJECTIVE
Interval History:  Out of ICU  Did well overnight  On RA    Review of Systems   Constitutional: Negative.   HENT: Negative.     Eyes: Negative.    Cardiovascular:  Positive for chest pain.   Respiratory: Negative.  Negative for cough.    Endocrine: Negative.    Skin: Negative.    Musculoskeletal: Negative.    Gastrointestinal: Negative.    Genitourinary: Negative.    Neurological: Negative.    Psychiatric/Behavioral: Negative.     Medications:  Continuous Infusions:  Scheduled Meds:   acetaminophen  650 mg Oral Q8H    amLODIPine  5 mg Oral Daily    aspirin  325 mg Oral Daily    docusate sodium  100 mg Oral BID    famotidine  20 mg Oral BID    furosemide (LASIX) injection  80 mg Intravenous BID    mupirocin  1 g Nasal BID    polyethylene glycol  17 g Oral Daily    potassium chloride  20 mEq Oral BID     PRN Meds:aspirin, bisacodyL, calcium gluconate IVPB, calcium gluconate IVPB, calcium gluconate IVPB, dextrose 10%, dextrose 10%, HYDROmorphone, magnesium sulfate IVPB, magnesium sulfate IVPB, ondansetron, oxyCODONE, oxyCODONE, potassium bicarbonate, potassium bicarbonate, potassium bicarbonate, potassium, sodium phosphates, potassium, sodium phosphates, potassium, sodium phosphates, sodium chloride 0.9%     Objective:     Vital Signs (Most Recent):  Temp: 98 °F (36.7 °C) (08/14/22 0744)  Pulse: 76 (08/14/22 0744)  Resp: 18 (08/14/22 0825)  BP: 99/65 (08/14/22 0744)  SpO2: 96 % (08/14/22 0744) Vital Signs (24h Range):  Temp:  [96.5 °F (35.8 °C)-98.9 °F (37.2 °C)] 98 °F (36.7 °C)  Pulse:  [49-94] 76  Resp:  [12-32] 18  SpO2:  [93 %-99 %] 96 %  BP: ()/(54-83) 99/65     Weight: 128.3 kg (282 lb 13.6 oz)  Body mass index is 47.07 kg/m².    SpO2: 96 %  O2 Device (Oxygen Therapy): room air    Intake/Output - Last 3 Shifts         08/12 0700 08/13 0659 08/13 0700  08/14 0659 08/14 0700  08/15 0659    P.O. 840      I.V. (mL/kg)       IV Piggyback 198.6      Total Intake(mL/kg) 1038.6 (7.7)      Urine (mL/kg/hr) 1175  (0.4) 400 (0.1)     Stool 0 0     Chest Tube 855 170     Total Output 2030 570     Net -991.4 -570            Urine Occurrence  2 x     Stool Occurrence 0 x 2 x             Lines/Drains/Airways       Drain  Duration                  Chest Tube 08/10/22 1440 1 Right Mediastinal 19 Fr. 3 days         Chest Tube 08/10/22 1441 2 Left Mediastinal 19 Fr. 3 days         Chest Tube 08/10/22 1441 3 Right Pleural 19 Fr. 3 days              Line  Duration                  Pacer Wires 08/10/22 1430 3 days              Peripheral Intravenous Line  Duration                  Peripheral IV - Single Lumen 18 G Right Antecubital -- days         Peripheral IV - Single Lumen 08/12/22 20 G Left Antecubital 2 days                    Physical Exam  Constitutional:       General: She is not in acute distress.  HENT:      Head: Normocephalic and atraumatic.   Eyes:      Pupils: Pupils are equal, round, and reactive to light.   Cardiovascular:      Rate and Rhythm: Normal rate and regular rhythm.      Comments: Midline sternotomy c/d with dressing in place    2 mediastinal and 1 pleural chest tube to suction    V wires in place. Back up paced at 45.  Pulmonary:      Effort: Pulmonary effort is normal. No respiratory distress.   Abdominal:      General: There is no distension.      Palpations: Abdomen is soft.   Musculoskeletal:      Right lower leg: No edema.      Left lower leg: No edema.   Skin:     General: Skin is warm and dry.      Capillary Refill: Capillary refill takes less than 2 seconds.   Neurological:      General: No focal deficit present.      Mental Status: She is alert and oriented to person, place, and time.       Significant Labs:  All pertinent labs from the last 24 hours have been reviewed.    Significant Diagnostics:  I have reviewed all pertinent imaging results/findings within the past 24 hours.

## 2022-08-14 NOTE — PT/OT/SLP PROGRESS
Physical Therapy Treatment    Patient Name:  Magalys Bass   MRN:  [id number]    Recommendations:     Discharge Recommendations:  home health PT   Discharge Equipment Recommendations: bath bench   Barriers to discharge: None    Assessment:     Magalys Bass is a 61 y.o. female admitted with a medical diagnosis of Nonrheumatic mitral valve regurgitation.  She presents with the following impairments/functional limitations:   (weakness; impaired endurance; impaired self care skills; impaired functional mobility; gait instability; impaired balance; decreased coordination; decreased safety awareness) . Pt was  motivated and cooperative with treatment session. Pt Progressing with PT Intervention. Pt Progressing with improving gait distance. Pt would continue to benefit from skilled PT to address overall functional mobility, goals , and to return to functional baseline.  Goals remain appropriate.      Rehab Prognosis: Good; patient would benefit from acute skilled PT services to address these deficits and reach maximum level of function.    Recent Surgery: Procedure(s) (LRB):  VALVULOPLASTY,MITRAL VALVE (N/A)  REPAIR, TRICUSPID VALVE (N/A)  KIRKPATRICK MAZE PROCEDURE (N/A) 4 Days Post-Op    Plan:     During this hospitalization, patient to be seen 5 x/week to address the identified rehab impairments via gait training, therapeutic activities, therapeutic exercises, neuromuscular re-education and progress toward the following goals:    · Plan of Care Expires:  09/10/22    Subjective     Chief Complaint:chest soreess  Patient/Family Comments/goals: I will try to walk  Pain/Comfort:  · Pain Rating 1: 0/10  · Pain Addressed 1: Reposition, Distraction, Cessation of Activity      Objective:     Communicated with RN prior to session.  Patient found up in chair with  (central line; chest tube; external pacer; pulse ox (continuous); telemetry) upon PT entry to room.     General Precautions: Standard, fall, sternal   Orthopedic  Precautions:N/A   Braces: N/A  Respiratory Status: Room air     Functional Mobility:  · Transfers:     ? Sit to Stand:  minimum assistance with no AD and hand-held assist  · Gait: 80 ft x 2 with  Campos, no AD, HHA; pt demo  decreased gait speed, decreased step length. Pt required 1 standing rest break between trials    AM-PAC 6 CLICK MOBILITY  Turning over in bed (including adjusting bedclothes, sheets and blankets)?: 2  Sitting down on and standing up from a chair with arms (e.g., wheelchair, bedside commode, etc.): 3  Moving from lying on back to sitting on the side of the bed?: 2  Moving to and from a bed to a chair (including a wheelchair)?: 2  Need to walk in hospital room?: 3  Climbing 3-5 steps with a railing?: 1  Basic Mobility Total Score: 13       Therapeutic Activities and Exercises:   Sternal Precautions: patient able to voice?2/3 precautions without assistance.?Pt provided education of sternal precautions.?Patient able to maintain precautions throughout session with?min?verbal cues  Therapist provided instruction and educated of  patient on progress, safety,d/c,PT POC,   proper body mechanics, energy conservation, and fall prevention strategies during tasks listed above, on the effects of prolonged immobility and the importance of performing OOB activity and exercises to promote healing and reduce recovery time      Updated white board with appropriate PT mobility information for medical team notification     Donned an extra gown   Call nursing/pct to transfer to chair/use bathroom. Pt stated understanding      Bedside table in front of patient and area set up for function, convenience, and safety. RN aware of patient's mobility needs and status. Questions/concerns addressed within PTA scope of practice; patient  with no further questions. Time was provided for active listening, discussion of health disposition, and discussion of safe discharge. Pt?verbalized?agreement .    Patient left up in chair with  all lines intact, call button in reach, nsg notified and spouse present..    GOALS:   Multidisciplinary Problems     Physical Therapy Goals        Problem: Physical Therapy    Goal Priority Disciplines Outcome Goal Variances Interventions   Physical Therapy Goal     PT, PT/OT Ongoing, Progressing     Description: Goals to be met by: 2022     Patient will increase functional independence with mobility by performin. Supine to sit with Contact Guard Assistance  2. Sit to supine with Contact Guard Assistance  3. Rolling to Left and Right with Contact Guard Assistance.  4. Sit to stand transfer with Contact Guard Assistance  5. Bed to chair transfer with Contact Guard Assistance using No Assistive Device  6. Gait  x 200 feet with Contact Guard Assistance using No Assistive Device.   7. Ascend/descend 6 stair with right Handrails Minimal Assistance using No Assistive Device.                      Time Tracking:     PT Received On: 22  PT Start Time: 1045     PT Stop Time: 1108  PT Total Time (min): 23 min     Billable Minutes: Gait Training 23    Treatment Type: Treatment  PT/PTA: PTA     PTA Visit Number: 1     2022

## 2022-08-14 NOTE — ASSESSMENT & PLAN NOTE
61 y.o. female POD#4 s/p MVr, TVr, Maze    D/c chest tubes  Keep wires for now. Can disconnect pacer  Lasix 80 BID  ASA, Statin  Cardiac diet   Ambulate

## 2022-08-15 PROBLEM — D62 ACUTE BLOOD LOSS ANEMIA: Status: ACTIVE | Noted: 2022-08-15

## 2022-08-15 PROBLEM — Z98.890 S/P TVR (TRICUSPID VALVE REPAIR): Status: ACTIVE | Noted: 2022-08-15

## 2022-08-15 PROBLEM — Z98.890 H/O MAZE PROCEDURE: Status: ACTIVE | Noted: 2022-08-15

## 2022-08-15 LAB
ALBUMIN SERPL BCP-MCNC: 2.7 G/DL (ref 3.5–5.2)
ALP SERPL-CCNC: 202 U/L (ref 55–135)
ALT SERPL W/O P-5'-P-CCNC: 19 U/L (ref 10–44)
ANION GAP SERPL CALC-SCNC: 14 MMOL/L (ref 8–16)
APTT BLDCRRT: 27.2 SEC (ref 21–32)
ASCENDING AORTA: 2.85 CM
AST SERPL-CCNC: 59 U/L (ref 10–40)
AV INDEX (PROSTH): 0.69
AV MEAN GRADIENT: 5 MMHG
AV PEAK GRADIENT: 10 MMHG
AV VALVE AREA: 2.78 CM2
AV VELOCITY RATIO: 0.73
BASOPHILS # BLD AUTO: 0.03 K/UL (ref 0–0.2)
BASOPHILS NFR BLD: 0.4 % (ref 0–1.9)
BILIRUB SERPL-MCNC: 1.3 MG/DL (ref 0.1–1)
BUN SERPL-MCNC: 17 MG/DL (ref 8–23)
CALCIUM SERPL-MCNC: 8.8 MG/DL (ref 8.7–10.5)
CHLORIDE SERPL-SCNC: 95 MMOL/L (ref 95–110)
CO2 SERPL-SCNC: 29 MMOL/L (ref 23–29)
CREAT SERPL-MCNC: 0.9 MG/DL (ref 0.5–1.4)
CV ECHO LV RWT: 0.48 CM
DIFFERENTIAL METHOD: ABNORMAL
DOP CALC AO PEAK VEL: 1.59 M/S
DOP CALC AO VTI: 25.31 CM
DOP CALC LVOT AREA: 4 CM2
DOP CALC LVOT DIAMETER: 2.26 CM
DOP CALC LVOT PEAK VEL: 1.16 M/S
DOP CALC LVOT STROKE VOLUME: 70.25 CM3
DOP CALCLVOT PEAK VEL VTI: 17.52 CM
ECHO LV POSTERIOR WALL: 1.02 CM (ref 0.6–1.1)
EJECTION FRACTION: 58 %
EOSINOPHIL # BLD AUTO: 0.1 K/UL (ref 0–0.5)
EOSINOPHIL NFR BLD: 1.6 % (ref 0–8)
ERYTHROCYTE [DISTWIDTH] IN BLOOD BY AUTOMATED COUNT: 13.3 % (ref 11.5–14.5)
EST. GFR  (NO RACE VARIABLE): >60 ML/MIN/1.73 M^2
FRACTIONAL SHORTENING: 30 % (ref 28–44)
GLUCOSE SERPL-MCNC: 97 MG/DL (ref 70–110)
HCT VFR BLD AUTO: 28.7 % (ref 37–48.5)
HGB BLD-MCNC: 9.5 G/DL (ref 12–16)
IMM GRANULOCYTES # BLD AUTO: 0.03 K/UL (ref 0–0.04)
IMM GRANULOCYTES NFR BLD AUTO: 0.4 % (ref 0–0.5)
INR PPP: 1.1 (ref 0.8–1.2)
INTERVENTRICULAR SEPTUM: 1.08 CM (ref 0.6–1.1)
LA MAJOR: 7.15 CM
LA MINOR: 7.25 CM
LA WIDTH: 5.33 CM
LEFT ATRIUM SIZE: 5.24 CM
LEFT ATRIUM VOLUME INDEX MOD: 50.5 ML/M2
LEFT ATRIUM VOLUME INDEX: 74.6 ML/M2
LEFT ATRIUM VOLUME MOD: 115.67 CM3
LEFT ATRIUM VOLUME: 170.92 CM3
LEFT INTERNAL DIMENSION IN SYSTOLE: 2.96 CM (ref 2.1–4)
LEFT VENTRICLE DIASTOLIC VOLUME INDEX: 34.52 ML/M2
LEFT VENTRICLE DIASTOLIC VOLUME: 79.06 ML
LEFT VENTRICLE MASS INDEX: 64 G/M2
LEFT VENTRICLE SYSTOLIC VOLUME INDEX: 14.8 ML/M2
LEFT VENTRICLE SYSTOLIC VOLUME: 33.96 ML
LEFT VENTRICULAR INTERNAL DIMENSION IN DIASTOLE: 4.21 CM (ref 3.5–6)
LEFT VENTRICULAR MASS: 147.55 G
LYMPHOCYTES # BLD AUTO: 0.9 K/UL (ref 1–4.8)
LYMPHOCYTES NFR BLD: 12.9 % (ref 18–48)
MAGNESIUM SERPL-MCNC: 1.6 MG/DL (ref 1.6–2.6)
MCH RBC QN AUTO: 24.2 PG (ref 27–31)
MCHC RBC AUTO-ENTMCNC: 33.1 G/DL (ref 32–36)
MCV RBC AUTO: 73 FL (ref 82–98)
MONOCYTES # BLD AUTO: 0.8 K/UL (ref 0.3–1)
MONOCYTES NFR BLD: 11.3 % (ref 4–15)
MV MEAN GRADIENT: 10 MMHG
MV PEAK E VEL: 1.89 M/S
MV STENOSIS PRESSURE HALF TIME: 175 MS
MV VALVE AREA P 1/2 METHOD: 1.26 CM2
NEUTROPHILS # BLD AUTO: 4.9 K/UL (ref 1.8–7.7)
NEUTROPHILS NFR BLD: 73.4 % (ref 38–73)
NRBC BLD-RTO: 1 /100 WBC
PHOSPHATE SERPL-MCNC: 3.4 MG/DL (ref 2.7–4.5)
PLATELET # BLD AUTO: 135 K/UL (ref 150–450)
PMV BLD AUTO: 13 FL (ref 9.2–12.9)
POTASSIUM SERPL-SCNC: 3.1 MMOL/L (ref 3.5–5.1)
POTASSIUM SERPL-SCNC: 3.2 MMOL/L (ref 3.5–5.1)
POTASSIUM SERPL-SCNC: 3.2 MMOL/L (ref 3.5–5.1)
PROT SERPL-MCNC: 5.9 G/DL (ref 6–8.4)
PROTHROMBIN TIME: 10.9 SEC (ref 9–12.5)
RA MAJOR: 5.34 CM
RA PRESSURE: 3 MMHG
RA WIDTH: 3.91 CM
RBC # BLD AUTO: 3.93 M/UL (ref 4–5.4)
RIGHT VENTRICULAR END-DIASTOLIC DIMENSION: 4.43 CM
SINUS: 2.91 CM
SODIUM SERPL-SCNC: 138 MMOL/L (ref 136–145)
STJ: 2.87 CM
TRICUSPID ANNULAR PLANE SYSTOLIC EXCURSION: 1.27 CM
WBC # BLD AUTO: 6.72 K/UL (ref 3.9–12.7)

## 2022-08-15 PROCEDURE — 83735 ASSAY OF MAGNESIUM: CPT | Performed by: PHYSICIAN ASSISTANT

## 2022-08-15 PROCEDURE — 85730 THROMBOPLASTIN TIME PARTIAL: CPT | Performed by: PHYSICIAN ASSISTANT

## 2022-08-15 PROCEDURE — 25000003 PHARM REV CODE 250

## 2022-08-15 PROCEDURE — 84132 ASSAY OF SERUM POTASSIUM: CPT | Mod: 91 | Performed by: PHYSICIAN ASSISTANT

## 2022-08-15 PROCEDURE — 80053 COMPREHEN METABOLIC PANEL: CPT | Performed by: STUDENT IN AN ORGANIZED HEALTH CARE EDUCATION/TRAINING PROGRAM

## 2022-08-15 PROCEDURE — 84100 ASSAY OF PHOSPHORUS: CPT | Performed by: PHYSICIAN ASSISTANT

## 2022-08-15 PROCEDURE — 63600175 PHARM REV CODE 636 W HCPCS

## 2022-08-15 PROCEDURE — 85025 COMPLETE CBC W/AUTO DIFF WBC: CPT | Performed by: PHYSICIAN ASSISTANT

## 2022-08-15 PROCEDURE — 85610 PROTHROMBIN TIME: CPT | Performed by: PHYSICIAN ASSISTANT

## 2022-08-15 PROCEDURE — 36415 COLL VENOUS BLD VENIPUNCTURE: CPT | Performed by: PHYSICIAN ASSISTANT

## 2022-08-15 PROCEDURE — 20600001 HC STEP DOWN PRIVATE ROOM

## 2022-08-15 PROCEDURE — 25000003 PHARM REV CODE 250: Performed by: PHYSICIAN ASSISTANT

## 2022-08-15 PROCEDURE — 97535 SELF CARE MNGMENT TRAINING: CPT | Mod: CO

## 2022-08-15 PROCEDURE — 25000003 PHARM REV CODE 250: Performed by: STUDENT IN AN ORGANIZED HEALTH CARE EDUCATION/TRAINING PROGRAM

## 2022-08-15 RX ORDER — POTASSIUM CHLORIDE 20 MEQ/1
40 TABLET, EXTENDED RELEASE ORAL 2 TIMES DAILY
Status: DISCONTINUED | OUTPATIENT
Start: 2022-08-15 | End: 2022-08-16

## 2022-08-15 RX ADMIN — FUROSEMIDE 80 MG: 10 INJECTION, SOLUTION INTRAMUSCULAR; INTRAVENOUS at 09:08

## 2022-08-15 RX ADMIN — FAMOTIDINE 20 MG: 20 TABLET ORAL at 08:08

## 2022-08-15 RX ADMIN — FAMOTIDINE 20 MG: 20 TABLET ORAL at 09:08

## 2022-08-15 RX ADMIN — FUROSEMIDE 80 MG: 10 INJECTION, SOLUTION INTRAMUSCULAR; INTRAVENOUS at 08:08

## 2022-08-15 RX ADMIN — ACETAMINOPHEN 650 MG: 325 TABLET ORAL at 05:08

## 2022-08-15 RX ADMIN — OXYCODONE HYDROCHLORIDE 10 MG: 10 TABLET ORAL at 08:08

## 2022-08-15 RX ADMIN — ACETAMINOPHEN 650 MG: 325 TABLET ORAL at 10:08

## 2022-08-15 RX ADMIN — DOCUSATE SODIUM 100 MG: 100 CAPSULE ORAL at 08:08

## 2022-08-15 RX ADMIN — MUPIROCIN 1 G: 20 OINTMENT TOPICAL at 09:08

## 2022-08-15 RX ADMIN — AMLODIPINE BESYLATE 5 MG: 5 TABLET ORAL at 09:08

## 2022-08-15 RX ADMIN — POTASSIUM CHLORIDE 40 MEQ: 1500 TABLET, EXTENDED RELEASE ORAL at 09:08

## 2022-08-15 RX ADMIN — POTASSIUM CHLORIDE 40 MEQ: 1500 TABLET, EXTENDED RELEASE ORAL at 08:08

## 2022-08-15 RX ADMIN — ASPIRIN 325 MG ORAL TABLET 325 MG: 325 PILL ORAL at 09:08

## 2022-08-15 NOTE — PLAN OF CARE
Problem: Occupational Therapy  Goal: Occupational Therapy Goal  Description: Goals to be met by: 8/18/22     Patient will increase functional independence with ADLs by performing:    LE Dressing with Minimal Assistance.  Grooming while standing with Contact Guard Assistance.  Toileting from bedside commode with Minimal Assistance for hygiene and clothing management.   Supine to sit with Minimal Assistance.  Step transfer with Contact Guard Assistance  Toilet transfer to bedside commode with Contact Guard Assistance.    Outcome: Ongoing, Progressing

## 2022-08-15 NOTE — ASSESSMENT & PLAN NOTE
- ASA   - BB   - Norvasc   - Bowel regimen   - Pain regimen   - Lasix and K   - Pepcid   - Pacer wires secured   - On room air   - Had BM

## 2022-08-15 NOTE — SUBJECTIVE & OBJECTIVE
Interval History: NAEON. HR has remained stable without bradycardia. Off oxygen. Needs to ambulate in the halls. TTE ordered.     Review of Systems   Constitutional: Negative for malaise/fatigue.   Cardiovascular:  Positive for leg swelling (chronic). Negative for chest pain.   Respiratory:  Negative for shortness of breath.    Genitourinary:  Negative for dysuria.   Neurological:  Negative for weakness.   Medications:  Continuous Infusions:  Scheduled Meds:   acetaminophen  650 mg Oral Q8H    amLODIPine  5 mg Oral Daily    aspirin  325 mg Oral Daily    docusate sodium  100 mg Oral BID    famotidine  20 mg Oral BID    furosemide (LASIX) injection  80 mg Intravenous BID    mupirocin  1 g Nasal BID    polyethylene glycol  17 g Oral Daily    potassium chloride  40 mEq Oral BID     PRN Meds:aspirin, bisacodyL, calcium gluconate IVPB, calcium gluconate IVPB, calcium gluconate IVPB, dextrose 10%, dextrose 10%, HYDROmorphone, magnesium sulfate IVPB, magnesium sulfate IVPB, ondansetron, oxyCODONE, oxyCODONE, potassium bicarbonate, potassium bicarbonate, potassium bicarbonate, potassium, sodium phosphates, potassium, sodium phosphates, potassium, sodium phosphates, sodium chloride 0.9%     Objective:     Vital Signs (Most Recent):  Temp: 97.3 °F (36.3 °C) (08/15/22 0801)  Pulse: 64 (08/15/22 0801)  Resp: 16 (08/15/22 0801)  BP: (!) 103/55 (08/15/22 0801)  SpO2: 97 % (08/15/22 0801)   Vital Signs (24h Range):  Temp:  [97.3 °F (36.3 °C)-98.4 °F (36.9 °C)] 97.3 °F (36.3 °C)  Pulse:  [64-94] 64  Resp:  [16-19] 16  SpO2:  [93 %-98 %] 97 %  BP: ()/(55-65) 103/55     Weight: 128.3 kg (282 lb 13.6 oz)  Body mass index is 47.07 kg/m².    SpO2: 97 %  O2 Device (Oxygen Therapy): room air    Intake/Output - Last 3 Shifts         08/13 0700 08/14 0659 08/14 0700  08/15 0659 08/15 0700 08/16 0659    P.O.  360     IV Piggyback       Total Intake(mL/kg)  360 (2.8)     Urine (mL/kg/hr) 400 (0.1)      Stool 0      Chest Tube 170       Total Output 570      Net -570 +360            Urine Occurrence 2 x 2 x     Stool Occurrence 2 x 2 x             Lines/Drains/Airways       Line  Duration                  Pacer Wires 08/10/22 1430 4 days              Peripheral Intravenous Line  Duration                  Peripheral IV - Single Lumen 18 G Right Antecubital -- days         Peripheral IV - Single Lumen 08/12/22 20 G Left Antecubital 3 days                    Physical Exam  Vitals reviewed.   Constitutional:       General: She is not in acute distress.     Appearance: She is well-developed. She is not diaphoretic.   HENT:      Head: Normocephalic and atraumatic.   Eyes:      Pupils: Pupils are equal, round, and reactive to light.   Neck:      Vascular: No JVD.   Cardiovascular:      Rate and Rhythm: Normal rate and regular rhythm.      Comments: Midline sternal incision c/d/i  Pulmonary:      Effort: Pulmonary effort is normal. No respiratory distress.   Abdominal:      General: There is no distension.   Musculoskeletal:         General: Swelling (L>R) present. Normal range of motion.      Cervical back: Normal range of motion.   Skin:     General: Skin is warm and dry.   Neurological:      Mental Status: She is alert and oriented to person, place, and time.   Psychiatric:         Speech: Speech normal.         Behavior: Behavior normal.         Thought Content: Thought content normal.         Judgment: Judgment normal.       Significant Labs:  BMP:   Recent Labs   Lab 08/15/22  0339 08/15/22  0855   GLU 97  --      --    K 3.1* 3.2*   CL 95  --    CO2 29  --    BUN 17  --    CREATININE 0.9  --    CALCIUM 8.8  --    MG 1.6  --      Cardiac markers: No results for input(s): CKMB, CPKMB, TROPONINT, TROPONINI, MYOGLOBIN in the last 48 hours.  CBC:   Recent Labs   Lab 08/15/22  0339   WBC 6.72   RBC 3.93*   HGB 9.5*   HCT 28.7*   *   MCV 73*   MCH 24.2*   MCHC 33.1     CMP:   Recent Labs   Lab 08/15/22  0339 08/15/22  0855   GLU 97  --     CALCIUM 8.8  --    ALBUMIN 2.7*  --    PROT 5.9*  --      --    K 3.1* 3.2*   CO2 29  --    CL 95  --    BUN 17  --    CREATININE 0.9  --    ALKPHOS 202*  --    ALT 19  --    AST 59*  --    BILITOT 1.3*  --        Significant Diagnostics:  I have reviewed all pertinent imaging results/findings within the past 24 hours.

## 2022-08-15 NOTE — PLAN OF CARE
Problem: Adult Inpatient Plan of Care  Goal: Plan of Care Review  Outcome: Ongoing, Progressing  Goal: Patient-Specific Goal (Individualized)  Outcome: Ongoing, Progressing  Goal: Absence of Hospital-Acquired Illness or Injury  Outcome: Ongoing, Progressing  Goal: Optimal Comfort and Wellbeing  Outcome: Ongoing, Progressing  Goal: Readiness for Transition of Care  Outcome: Ongoing, Progressing     Problem: Bariatric Environmental Safety  Goal: Safety Maintained with Care  Outcome: Ongoing, Progressing     Problem: Infection  Goal: Absence of Infection Signs and Symptoms  Outcome: Ongoing, Progressing     Problem: Fall Injury Risk  Goal: Absence of Fall and Fall-Related Injury  Outcome: Ongoing, Progressing     Problem: Skin Injury Risk Increased  Goal: Skin Health and Integrity  Outcome: Ongoing, Progressing

## 2022-08-15 NOTE — RESPIRATORY THERAPY
RAPID RESPONSE RESPIRATORY CHART CHECK       Chart check completed.  Please call [phone] for further concerns or assistance.

## 2022-08-15 NOTE — PROGRESS NOTES
[facility] - Cardiology Stepdown  Cardiothoracic Surgery  Progress Note    Patient Name: Magalys Bass  MRN: [id number]  Admission Date: 8/10/2022  Hospital Length of Stay: 5 days  Code Status: Full Code   Attending Physician: Manish Morris MD   Referring Provider: Manish Morris MD  Principal Problem:Nonrheumatic mitral valve regurgitation      Subjective:     Post-Op Info:  Procedure(s) (LRB):  VALVULOPLASTY,MITRAL VALVE (N/A)  REPAIR, TRICUSPID VALVE (N/A)  KIRKPATRICK MAZE PROCEDURE (N/A)   5 Days Post-Op     Interval History: NAEON. HR has remained stable without bradycardia. Off oxygen. Needs to ambulate in the halls. TTE ordered.     Review of Systems   Constitutional: Negative for malaise/fatigue.   Cardiovascular:  Positive for leg swelling (chronic). Negative for chest pain.   Respiratory:  Negative for shortness of breath.    Genitourinary:  Negative for dysuria.   Neurological:  Negative for weakness.   Medications:  Continuous Infusions:  Scheduled Meds:   acetaminophen  650 mg Oral Q8H    amLODIPine  5 mg Oral Daily    aspirin  325 mg Oral Daily    docusate sodium  100 mg Oral BID    famotidine  20 mg Oral BID    furosemide (LASIX) injection  80 mg Intravenous BID    mupirocin  1 g Nasal BID    polyethylene glycol  17 g Oral Daily    potassium chloride  40 mEq Oral BID     PRN Meds:aspirin, bisacodyL, calcium gluconate IVPB, calcium gluconate IVPB, calcium gluconate IVPB, dextrose 10%, dextrose 10%, HYDROmorphone, magnesium sulfate IVPB, magnesium sulfate IVPB, ondansetron, oxyCODONE, oxyCODONE, potassium bicarbonate, potassium bicarbonate, potassium bicarbonate, potassium, sodium phosphates, potassium, sodium phosphates, potassium, sodium phosphates, sodium chloride 0.9%     Objective:     Vital Signs (Most Recent):  Temp: 97.3 °F (36.3 °C) (08/15/22 0801)  Pulse: 64 (08/15/22 0801)  Resp: 16 (08/15/22 0801)  BP: (!) 103/55 (08/15/22 0801)  SpO2: 97 % (08/15/22 0801)   Vital Signs (24h  Range):  Temp:  [97.3 °F (36.3 °C)-98.4 °F (36.9 °C)] 97.3 °F (36.3 °C)  Pulse:  [64-94] 64  Resp:  [16-19] 16  SpO2:  [93 %-98 %] 97 %  BP: ()/(55-65) 103/55     Weight: 128.3 kg (282 lb 13.6 oz)  Body mass index is 47.07 kg/m².    SpO2: 97 %  O2 Device (Oxygen Therapy): room air    Intake/Output - Last 3 Shifts         08/13 0700  08/14 0659 08/14 0700  08/15 0659 08/15 0700  08/16 0659    P.O.  360     IV Piggyback       Total Intake(mL/kg)  360 (2.8)     Urine (mL/kg/hr) 400 (0.1)      Stool 0      Chest Tube 170      Total Output 570      Net -570 +360            Urine Occurrence 2 x 2 x     Stool Occurrence 2 x 2 x             Lines/Drains/Airways       Line  Duration                  Pacer Wires 08/10/22 1430 4 days              Peripheral Intravenous Line  Duration                  Peripheral IV - Single Lumen 18 G Right Antecubital -- days         Peripheral IV - Single Lumen 08/12/22 20 G Left Antecubital 3 days                    Physical Exam  Vitals reviewed.   Constitutional:       General: She is not in acute distress.     Appearance: She is well-developed. She is not diaphoretic.   HENT:      Head: Normocephalic and atraumatic.   Eyes:      Pupils: Pupils are equal, round, and reactive to light.   Neck:      Vascular: No JVD.   Cardiovascular:      Rate and Rhythm: Normal rate and regular rhythm.      Comments: Midline sternal incision c/d/i  Pulmonary:      Effort: Pulmonary effort is normal. No respiratory distress.   Abdominal:      General: There is no distension.   Musculoskeletal:         General: Swelling (L>R) present. Normal range of motion.      Cervical back: Normal range of motion.   Skin:     General: Skin is warm and dry.   Neurological:      Mental Status: She is alert and oriented to person, place, and time.   Psychiatric:         Speech: Speech normal.         Behavior: Behavior normal.         Thought Content: Thought content normal.         Judgment: Judgment normal.        Significant Labs:  BMP:   Recent Labs   Lab 08/15/22  0339 08/15/22  0855   GLU 97  --      --    K 3.1* 3.2*   CL 95  --    CO2 29  --    BUN 17  --    CREATININE 0.9  --    CALCIUM 8.8  --    MG 1.6  --      Cardiac markers: No results for input(s): CKMB, CPKMB, TROPONINT, TROPONINI, MYOGLOBIN in the last 48 hours.  CBC:   Recent Labs   Lab 08/15/22  0339   WBC 6.72   RBC 3.93*   HGB 9.5*   HCT 28.7*   *   MCV 73*   MCH 24.2*   MCHC 33.1     CMP:   Recent Labs   Lab 08/15/22  0339 08/15/22  0855   GLU 97  --    CALCIUM 8.8  --    ALBUMIN 2.7*  --    PROT 5.9*  --      --    K 3.1* 3.2*   CO2 29  --    CL 95  --    BUN 17  --    CREATININE 0.9  --    ALKPHOS 202*  --    ALT 19  --    AST 59*  --    BILITOT 1.3*  --        Significant Diagnostics:  I have reviewed all pertinent imaging results/findings within the past 24 hours.    Assessment/Plan:     H/O maze procedure  - NSR on monitor     S/P TVR (tricuspid valve repair)  - See s/p MVr     Acute blood loss anemia  - Expected post operatively   - CBC daily     Stress hyperglycemia  - Endocrine following     Morbid obesity  - PT/OT     Hypertension  - Norvasc   - BB     Paroxysmal atrial fibrillation  - NSR   - BB     S/P mitral valve repair  - ASA   - BB   - Norvasc   - Bowel regimen   - Pain regimen   - Lasix and K   - Pepcid   - Pacer wires secured   - On room air   - Had BM      Dispo: CSU. D/C next couple days       DUANE AllisonC  Cardiothoracic Surgery  [facility] - Cardiology Stepdown

## 2022-08-15 NOTE — PT/OT/SLP PROGRESS
"Occupational Therapy   Treatment    Name: Magalys Bass  MRN: [id number]  Admitting Diagnosis:  Nonrheumatic mitral valve regurgitation  5 Days Post-Op   Procedure(s):  VALVULOPLASTY,MITRAL VALVE  REPAIR, TRICUSPID VALVE  KIRKPATRICK MAZE PROCEDURE     Recommendations:     Discharge Recommendations: home health OT  Discharge Equipment Recommendations:  bath bench  Barriers to discharge:  None    Assessment:     Magalys Bass is a 61 y.o. female with a medical diagnosis of Nonrheumatic mitral valve regurgitation.  She presents with the following performance deficits affecting function are weakness, impaired endurance, impaired self care skills, impaired functional mobility, gait instability, impaired balance, decreased upper extremity function, decreased ROM, pain, decreased safety awareness, impaired cardiopulmonary response to activity, orthopedic precautions.     Patient agreeable to OT session and tolerated well. Patient primarily limited by deconditioning, but is motivated and progressing appropriately towards goals. Patient would benefit from continued OT services to address deficits and progress towards goals. Continue OT POC.    Rehab Prognosis:  Good; patient would benefit from acute skilled OT services to address these deficits and reach maximum level of function.       Plan:     Patient to be seen 5 x/week to address the above listed problems via self-care/home management, therapeutic activities, therapeutic exercises, neuromuscular re-education  · Plan of Care Expires: 09/10/22  · Plan of Care Reviewed with: patient    Subjective   "I just want to get stronger before I go home."    Pain/Comfort:  · Pain Rating 1: 0/10  · Pain Rating Post-Intervention 1: 0/10    Objective:     Communicated with: RN and OTR prior to session.  Patient found seated on toilet with telemetry upon OT entry to room.  A client care conference was completed by the OTR and the ARIZMENDI prior to treatment by the ARIZMENDI to discuss the " patient's POC and current status.    General Precautions: Standard, fall, sternal   Orthopedic Precautions:N/A   Braces: N/A  Respiratory Status: Room air     Occupational Performance:     Bed Mobility:    · Did not assess. Patient received OOB    Functional Mobility/Transfers:  · Patient completed Sit <> Stand Transfer with contact guard assistance  with  no assistive device   · Patient completed Chair Transfer using Step Transfer technique with contact guard assistance with no assistive device  · Patient completed Toilet Transfer Step Transfer technique with contact guard assistance with  no AD  · Functional Mobility: within room environment and community distances in hallway (~50' x2) using HHA with CGA with focus in muscular endurance and restoring muscular strength required for increased activity tolerance and (I) in ADLs. Patient required x3 standing rest breaks intermittently. Instruction in pursed lip breathing technique to maximize recovery with good carry over demonstrated.     Activities of Daily Living:  · Grooming: stand by assistance for decreased endurance to complete hand and facial hygiene  · Upper Body Dressing: minimum assistance for decreased ROM to don gown posteriorly      AMPA 6 Click ADL: 18    Treatment & Education:  · Instruction and facilitation in proper body mechanics, LB engagement, sequencing, and energy conservation techniques  · Re-educated on sternal precautions as patient initially able to recall 2/3  · Education provided on OT POC, goals, and current progress  · Addressed all patient questions/concerns within LIAM scope of practice.     Patient left up in chair with all lines intact and call button in reachEducation:      GOALS:   Multidisciplinary Problems     Occupational Therapy Goals        Problem: Occupational Therapy    Goal Priority Disciplines Outcome Interventions   Occupational Therapy Goal     OT, PT/OT Ongoing, Progressing    Description: Goals to be met by: 8/18/22      Patient will increase functional independence with ADLs by performing:    LE Dressing with Minimal Assistance.  Grooming while standing with Contact Guard Assistance.  Toileting from bedside commode with Minimal Assistance for hygiene and clothing management.   Supine to sit with Minimal Assistance.  Step transfer with Contact Guard Assistance  Toilet transfer to bedside commode with Contact Guard Assistance.                     Time Tracking:     OT Date of Treatment: 08/15/22  OT Start Time: 1003  OT Stop Time: 1019  OT Total Time (min): 16 min    Billable Minutes:Self Care/Home Management 16    OT/LIAM: LIAM     LIAM Visit Number: 1    8/15/2022

## 2022-08-16 VITALS
BODY MASS INDEX: 47.13 KG/M2 | SYSTOLIC BLOOD PRESSURE: 103 MMHG | DIASTOLIC BLOOD PRESSURE: 51 MMHG | TEMPERATURE: 98 F | HEIGHT: 65 IN | WEIGHT: 282.88 LBS | OXYGEN SATURATION: 98 % | HEART RATE: 73 BPM | RESPIRATION RATE: 18 BRPM

## 2022-08-16 LAB
ALBUMIN SERPL BCP-MCNC: 2.5 G/DL (ref 3.5–5.2)
ALP SERPL-CCNC: 187 U/L (ref 55–135)
ALT SERPL W/O P-5'-P-CCNC: 20 U/L (ref 10–44)
ANION GAP SERPL CALC-SCNC: 10 MMOL/L (ref 8–16)
APTT BLDCRRT: 27.5 SEC (ref 21–32)
AST SERPL-CCNC: 53 U/L (ref 10–40)
BASOPHILS # BLD AUTO: 0.03 K/UL (ref 0–0.2)
BASOPHILS NFR BLD: 0.6 % (ref 0–1.9)
BILIRUB SERPL-MCNC: 1.1 MG/DL (ref 0.1–1)
BUN SERPL-MCNC: 15 MG/DL (ref 8–23)
CALCIUM SERPL-MCNC: 8.6 MG/DL (ref 8.7–10.5)
CHLORIDE SERPL-SCNC: 93 MMOL/L (ref 95–110)
CO2 SERPL-SCNC: 31 MMOL/L (ref 23–29)
CREAT SERPL-MCNC: 1 MG/DL (ref 0.5–1.4)
DIFFERENTIAL METHOD: ABNORMAL
EOSINOPHIL # BLD AUTO: 0.1 K/UL (ref 0–0.5)
EOSINOPHIL NFR BLD: 1.8 % (ref 0–8)
ERYTHROCYTE [DISTWIDTH] IN BLOOD BY AUTOMATED COUNT: 13.4 % (ref 11.5–14.5)
EST. GFR  (NO RACE VARIABLE): >60 ML/MIN/1.73 M^2
GLUCOSE SERPL-MCNC: 93 MG/DL (ref 70–110)
HCT VFR BLD AUTO: 27.6 % (ref 37–48.5)
HGB BLD-MCNC: 8.7 G/DL (ref 12–16)
IMM GRANULOCYTES # BLD AUTO: 0.02 K/UL (ref 0–0.04)
IMM GRANULOCYTES NFR BLD AUTO: 0.4 % (ref 0–0.5)
INR PPP: 1.1 (ref 0.8–1.2)
LYMPHOCYTES # BLD AUTO: 1 K/UL (ref 1–4.8)
LYMPHOCYTES NFR BLD: 19.3 % (ref 18–48)
MAGNESIUM SERPL-MCNC: 1.6 MG/DL (ref 1.6–2.6)
MCH RBC QN AUTO: 24 PG (ref 27–31)
MCHC RBC AUTO-ENTMCNC: 31.5 G/DL (ref 32–36)
MCV RBC AUTO: 76 FL (ref 82–98)
MONOCYTES # BLD AUTO: 0.7 K/UL (ref 0.3–1)
MONOCYTES NFR BLD: 12.6 % (ref 4–15)
NEUTROPHILS # BLD AUTO: 3.4 K/UL (ref 1.8–7.7)
NEUTROPHILS NFR BLD: 65.3 % (ref 38–73)
NRBC BLD-RTO: 1 /100 WBC
PHOSPHATE SERPL-MCNC: 3.3 MG/DL (ref 2.7–4.5)
PLATELET # BLD AUTO: 133 K/UL (ref 150–450)
PMV BLD AUTO: 12.7 FL (ref 9.2–12.9)
POTASSIUM SERPL-SCNC: 3.2 MMOL/L (ref 3.5–5.1)
POTASSIUM SERPL-SCNC: 3.4 MMOL/L (ref 3.5–5.1)
PROT SERPL-MCNC: 5.6 G/DL (ref 6–8.4)
PROTHROMBIN TIME: 10.9 SEC (ref 9–12.5)
RBC # BLD AUTO: 3.63 M/UL (ref 4–5.4)
SODIUM SERPL-SCNC: 134 MMOL/L (ref 136–145)
WBC # BLD AUTO: 5.14 K/UL (ref 3.9–12.7)

## 2022-08-16 PROCEDURE — 85610 PROTHROMBIN TIME: CPT | Performed by: PHYSICIAN ASSISTANT

## 2022-08-16 PROCEDURE — 80053 COMPREHEN METABOLIC PANEL: CPT | Performed by: STUDENT IN AN ORGANIZED HEALTH CARE EDUCATION/TRAINING PROGRAM

## 2022-08-16 PROCEDURE — 25000003 PHARM REV CODE 250: Performed by: STUDENT IN AN ORGANIZED HEALTH CARE EDUCATION/TRAINING PROGRAM

## 2022-08-16 PROCEDURE — 63600175 PHARM REV CODE 636 W HCPCS: Performed by: PHYSICIAN ASSISTANT

## 2022-08-16 PROCEDURE — 25000003 PHARM REV CODE 250: Performed by: PHYSICIAN ASSISTANT

## 2022-08-16 PROCEDURE — 36415 COLL VENOUS BLD VENIPUNCTURE: CPT | Performed by: PHYSICIAN ASSISTANT

## 2022-08-16 PROCEDURE — 85730 THROMBOPLASTIN TIME PARTIAL: CPT | Performed by: PHYSICIAN ASSISTANT

## 2022-08-16 PROCEDURE — 85025 COMPLETE CBC W/AUTO DIFF WBC: CPT | Performed by: PHYSICIAN ASSISTANT

## 2022-08-16 PROCEDURE — 84100 ASSAY OF PHOSPHORUS: CPT | Performed by: PHYSICIAN ASSISTANT

## 2022-08-16 PROCEDURE — 84132 ASSAY OF SERUM POTASSIUM: CPT | Performed by: PHYSICIAN ASSISTANT

## 2022-08-16 PROCEDURE — 25000003 PHARM REV CODE 250

## 2022-08-16 PROCEDURE — 83735 ASSAY OF MAGNESIUM: CPT | Performed by: PHYSICIAN ASSISTANT

## 2022-08-16 RX ORDER — FUROSEMIDE 40 MG/1
TABLET ORAL
Qty: 60 TABLET | Refills: 11 | Status: SHIPPED | OUTPATIENT
Start: 2022-08-16 | End: 2022-09-06 | Stop reason: ALTCHOICE

## 2022-08-16 RX ORDER — POLYETHYLENE GLYCOL 3350 17 G/17G
17 POWDER, FOR SOLUTION ORAL DAILY
Qty: 5 EACH | Refills: 0 | Status: SHIPPED | OUTPATIENT
Start: 2022-08-16 | End: 2022-09-06

## 2022-08-16 RX ORDER — FUROSEMIDE 10 MG/ML
40 INJECTION INTRAMUSCULAR; INTRAVENOUS 2 TIMES DAILY
Status: DISCONTINUED | OUTPATIENT
Start: 2022-08-16 | End: 2022-08-16 | Stop reason: HOSPADM

## 2022-08-16 RX ORDER — POTASSIUM CHLORIDE 20 MEQ/1
40 TABLET, EXTENDED RELEASE ORAL 3 TIMES DAILY
Status: DISCONTINUED | OUTPATIENT
Start: 2022-08-16 | End: 2022-08-16 | Stop reason: HOSPADM

## 2022-08-16 RX ORDER — DOCUSATE SODIUM 100 MG/1
100 CAPSULE, LIQUID FILLED ORAL 2 TIMES DAILY PRN
Qty: 30 CAPSULE | Refills: 0 | Status: SHIPPED | OUTPATIENT
Start: 2022-08-16 | End: 2022-08-16 | Stop reason: SDUPTHER

## 2022-08-16 RX ORDER — FAMOTIDINE 20 MG/1
20 TABLET, FILM COATED ORAL 2 TIMES DAILY
Qty: 60 TABLET | Refills: 0 | Status: SHIPPED | OUTPATIENT
Start: 2022-08-16 | End: 2022-09-06

## 2022-08-16 RX ORDER — AMLODIPINE BESYLATE 2.5 MG/1
2.5 TABLET ORAL DAILY
Qty: 30 TABLET | Refills: 11 | Status: SHIPPED | OUTPATIENT
Start: 2022-08-17 | End: 2022-08-16 | Stop reason: SDUPTHER

## 2022-08-16 RX ORDER — POTASSIUM CHLORIDE 20 MEQ/1
TABLET, EXTENDED RELEASE ORAL
Qty: 30 TABLET | Refills: 3 | Status: SHIPPED | OUTPATIENT
Start: 2022-08-16 | End: 2022-09-22

## 2022-08-16 RX ORDER — AMLODIPINE BESYLATE 2.5 MG/1
2.5 TABLET ORAL DAILY
Qty: 30 TABLET | Refills: 11 | Status: SHIPPED | OUTPATIENT
Start: 2022-08-17 | End: 2023-08-17

## 2022-08-16 RX ORDER — ACETAMINOPHEN 500 MG
1000 TABLET ORAL EVERY 6 HOURS PRN
Qty: 30 TABLET | Refills: 0 | Status: ON HOLD | OUTPATIENT
Start: 2022-08-16 | End: 2023-05-10 | Stop reason: CLARIF

## 2022-08-16 RX ORDER — ACETAMINOPHEN 500 MG
1000 TABLET ORAL EVERY 6 HOURS PRN
Qty: 30 TABLET | Refills: 0 | Status: SHIPPED | OUTPATIENT
Start: 2022-08-16 | End: 2022-08-16 | Stop reason: SDUPTHER

## 2022-08-16 RX ORDER — FAMOTIDINE 20 MG/1
20 TABLET, FILM COATED ORAL 2 TIMES DAILY
Qty: 60 TABLET | Refills: 0 | Status: SHIPPED | OUTPATIENT
Start: 2022-08-16 | End: 2022-08-16 | Stop reason: SDUPTHER

## 2022-08-16 RX ORDER — OXYCODONE HYDROCHLORIDE 5 MG/1
5 TABLET ORAL EVERY 4 HOURS PRN
Qty: 42 TABLET | Refills: 0 | Status: SHIPPED | OUTPATIENT
Start: 2022-08-16 | End: 2022-08-16 | Stop reason: SDUPTHER

## 2022-08-16 RX ORDER — ASPIRIN 325 MG
325 TABLET ORAL DAILY
Qty: 30 TABLET | Refills: 11 | Status: ON HOLD | OUTPATIENT
Start: 2022-08-17 | End: 2023-05-10 | Stop reason: CLARIF

## 2022-08-16 RX ORDER — OXYCODONE HYDROCHLORIDE 5 MG/1
5 TABLET ORAL EVERY 4 HOURS PRN
Qty: 42 TABLET | Refills: 0 | Status: SHIPPED | OUTPATIENT
Start: 2022-08-16 | End: 2022-08-23

## 2022-08-16 RX ORDER — POLYETHYLENE GLYCOL 3350 17 G/17G
17 POWDER, FOR SOLUTION ORAL DAILY
Qty: 5 EACH | Refills: 0 | Status: SHIPPED | OUTPATIENT
Start: 2022-08-16 | End: 2022-08-16 | Stop reason: SDUPTHER

## 2022-08-16 RX ORDER — POTASSIUM CHLORIDE 20 MEQ/1
40 TABLET, EXTENDED RELEASE ORAL 2 TIMES DAILY
Qty: 30 TABLET | Refills: 3 | Status: SHIPPED | OUTPATIENT
Start: 2022-08-16 | End: 2022-08-16 | Stop reason: SDUPTHER

## 2022-08-16 RX ORDER — LANOLIN ALCOHOL/MO/W.PET/CERES
800 CREAM (GRAM) TOPICAL ONCE
Status: COMPLETED | OUTPATIENT
Start: 2022-08-16 | End: 2022-08-16

## 2022-08-16 RX ORDER — DOCUSATE SODIUM 100 MG/1
100 CAPSULE, LIQUID FILLED ORAL 2 TIMES DAILY PRN
Qty: 30 CAPSULE | Refills: 0 | Status: SHIPPED | OUTPATIENT
Start: 2022-08-16

## 2022-08-16 RX ORDER — FUROSEMIDE 40 MG/1
TABLET ORAL
Qty: 60 TABLET | Refills: 11 | Status: SHIPPED | OUTPATIENT
Start: 2022-08-16 | End: 2022-08-16 | Stop reason: SDUPTHER

## 2022-08-16 RX ORDER — AMLODIPINE BESYLATE 2.5 MG/1
2.5 TABLET ORAL DAILY
Status: DISCONTINUED | OUTPATIENT
Start: 2022-08-16 | End: 2022-08-16 | Stop reason: HOSPADM

## 2022-08-16 RX ORDER — ASPIRIN 325 MG
325 TABLET ORAL DAILY
Qty: 30 TABLET | Refills: 11 | Status: SHIPPED | OUTPATIENT
Start: 2022-08-17 | End: 2022-08-16 | Stop reason: SDUPTHER

## 2022-08-16 RX ADMIN — DOCUSATE SODIUM 100 MG: 100 CAPSULE ORAL at 09:08

## 2022-08-16 RX ADMIN — ASPIRIN 325 MG ORAL TABLET 325 MG: 325 PILL ORAL at 09:08

## 2022-08-16 RX ADMIN — FAMOTIDINE 20 MG: 20 TABLET ORAL at 09:08

## 2022-08-16 RX ADMIN — FUROSEMIDE 40 MG: 10 INJECTION, SOLUTION INTRAMUSCULAR; INTRAVENOUS at 09:08

## 2022-08-16 RX ADMIN — Medication 800 MG: at 09:08

## 2022-08-16 RX ADMIN — POTASSIUM CHLORIDE 40 MEQ: 1500 TABLET, EXTENDED RELEASE ORAL at 09:08

## 2022-08-16 RX ADMIN — AMLODIPINE BESYLATE 2.5 MG: 2.5 TABLET ORAL at 09:08

## 2022-08-16 RX ADMIN — ACETAMINOPHEN 650 MG: 325 TABLET ORAL at 12:08

## 2022-08-16 RX ADMIN — ACETAMINOPHEN 650 MG: 325 TABLET ORAL at 06:08

## 2022-08-16 NOTE — HPI
Ms. Bass is a very pleasant 61 year old woman who initially presented with dyspnea on exertion. She had a thoughtful and thorough evaluation which demonstrated severe mitral regurgitation and tricuspid regurgitation. She now presents for surgical correction.

## 2022-08-16 NOTE — PLAN OF CARE
[facility] - Cardiology Stepdown  Discharge Final Note    Primary Care Provider: Yonathan Brewer MD    Expected Discharge Date: 8/16/2022    Final Discharge Note (most recent)     Final Note - 08/16/22 1025        Final Note    Assessment Type Final Discharge Note     Anticipated Discharge Disposition Home or Self Care               Pt unable to get HH due to her insurance not having HH benefits (Medicaid).    Patty Monsalve, EMIGDIO  [facility]  [id number]      Future Appointments   Date Time Provider Department Center   9/8/2022  1:30 PM EKG, APPT [facility] EKG [facility]   9/8/2022  2:00 PM [facility] [id number] 485 LB LIMIT [facility] XRAY OP [facility]   9/8/2022  2:15 PM Manish Morris MD [facility]

## 2022-08-16 NOTE — DISCHARGE SUMMARY
[facility] - Cardiology Stepdown  Cardiothoracic Surgery  Discharge Summary      Patient Name: Magalys Bass  MRN: [id number]  Admission Date: 8/10/2022  Hospital Length of Stay: 6 days  Discharge Date and Time:  08/16/2022 10:14 AM  Attending Physician: Manish Morris MD   Discharging Provider: Laureen Ignacio PA-C  Primary Care Provider: Yonathan Brewer MD    HPI:   Ms. Bass is a very pleasant 61 year old woman who initially presented with dyspnea on exertion. She had a thoughtful and thorough evaluation which demonstrated severe mitral regurgitation and tricuspid regurgitation. She now presents for surgical correction.       Procedure(s) (LRB):  VALVULOPLASTY,MITRAL VALVE (N/A)  REPAIR, TRICUSPID VALVE (N/A)  KIRKPATRICK MAZE PROCEDURE (N/A)      Indwelling Lines/Drains at time of discharge:   Lines/Drains/Airways     Line  Duration                Pacer Wires 08/10/22 1430 5 days              Hospital Course: On 8/10/22, the patient was taken to the Operating Room for the above stated procedure. Please see the previously dictated operative report for complete details. Postoperatively, the patient was taken from the  Operating Room to the ICU where the vital signs were monitored and pain was kept under control. The patient was weaned from the drips and extubated in the ICU per protocol. Once hemodynamically stable, the patient was transferred to the Cardiac Step-Down floor for continued strengthening and ambulation. On postoperative day 6, the patient was ready for discharge to home. At the time of discharge, the patient was ambulating unassisted. Pain was well controlled with oral analgesics and the patient was tolerating the diet.     MOBILITY AND ACTIVITY: As tolerated. Patient may shower. No heavy lifting of greater than 5 pounds and no driving.     DIET: An 1800-calorie ADA with a 1500 mL fluid restriction.     WOUND CARE INSTRUCTIONS: Check for redness, swelling and drainage around the  incision or  wound. Patient is to call for any obvious bleeding, drainage, pus from the wound, unusual problems or difficulties or temperature of greater than 101   degrees.     FOLLOWUP: Follow up with Dr. Morris in approximately 3 weeks. Prior to this  appointment, the patient will have a chest x-ray and EKG.     Patient not placed on Ace-Inhibitor at the time of discharge due to potential for hypotension   Patient not placed on Beta-Blocker at the time of discharge due to potential for bradycardia     Patient not eligible for  due to insurance issues.     DISCHARGE CONDITION: At the time of discharge, the patient was in sinus rhythm and afebrile with stable vital signs.        Goals of Care Treatment Preferences:  Code Status: Full Code      Consults (From admission, onward)        Status Ordering Provider     Consult to Endocrinology  Once        Provider:  (Not yet assigned)    Completed BASSEM BOSS     Consult Case Management/Social Work  Once        Provider:  (Not yet assigned)    Acknowledged BASSEM BOSS          No new Assessment & Plan notes have been filed under this hospital service since the last note was generated.  Service: Cardiothoracic Surgery    Final Active Diagnoses:    Diagnosis Date Noted POA    PRINCIPAL PROBLEM:  Nonrheumatic mitral valve regurgitation [I34.0] 02/11/2022 Yes    Acute blood loss anemia [D62] 08/15/2022 No    S/P TVR (tricuspid valve repair) [Z98.890] 08/15/2022 Not Applicable    H/O maze procedure [Z98.890] 08/15/2022 Not Applicable    Stress hyperglycemia [R73.9] 08/10/2022 Unknown    Morbid obesity [E66.01] 02/11/2022 Yes    Paroxysmal atrial fibrillation [I48.0] 02/11/2022 Yes    Hypertension [I10] 02/11/2022 Yes    S/P mitral valve repair [Z98.890] 03/06/2015 Not Applicable      Problems Resolved During this Admission:      Discharged Condition: stable    Disposition: Home or Self Care    Follow Up:    Patient Instructions:      Ambulatory  referral/consult to Physical/Occupational Therapy   Standing Status: Future   Referral Priority: Routine Referral Type: Physical Medicine   Referral Reason: Specialty Services Required   Number of Visits Requested: 1     Medications:  Reconciled Home Medications:      Medication List      START taking these medications    acetaminophen 500 MG tablet  Commonly known as: TYLENOL  Take 2 tablets (1,000 mg total) by mouth every 6 (six) hours as needed for Pain.     aspirin 325 MG tablet  Take 1 tablet (325 mg total) by mouth once daily.  Start taking on: August 17, 2022  Replaces: aspirin 81 MG EC tablet     docusate sodium 100 MG capsule  Commonly known as: COLACE  Take 1 capsule (100 mg total) by mouth 2 (two) times daily as needed for Constipation.     oxyCODONE 5 MG immediate release tablet  Commonly known as: ROXICODONE  Take 1 tablet (5 mg total) by mouth every 4 (four) hours as needed for Pain.     polyethylene glycol 17 gram Pwpk  Commonly known as: GLYCOLAX  Take 17 g by mouth once daily.        CHANGE how you take these medications    amLODIPine 2.5 MG tablet  Commonly known as: NORVASC  Take 1 tablet (2.5 mg total) by mouth once daily.  Start taking on: August 17, 2022  What changed:   · medication strength  · how much to take  · when to take this     furosemide 40 MG tablet  Commonly known as: LASIX  Take one tablet by mouth twice daily for 7 days then decrease to once daily  What changed:   · medication strength  · how much to take  · how to take this  · when to take this  · reasons to take this  · additional instructions     potassium chloride SA 20 MEQ tablet  Commonly known as: K-DUR,KLOR-CON  Take 2 tablets (40 mEq total) by mouth 2 (two) times daily. For 7 days then decrease to 40mEq daily  What changed:   · how much to take  · when to take this  · additional instructions        CONTINUE taking these medications    diclofenac sodium 1 % Gel  Commonly known as: VOLTAREN  Apply topically.      ergocalciferol 50,000 unit Cap  Commonly known as: ERGOCALCIFEROL  Take 50,000 Units by mouth every 7 days.     famotidine 20 MG tablet  Commonly known as: PEPCID  Take 1 tablet (20 mg total) by mouth 2 (two) times daily.     fluticasone propionate 50 mcg/actuation nasal spray  Commonly known as: FLONASE  1 spray by Each Nostril route once daily.     omega-3 acid ethyl esters 1 gram capsule  Commonly known as: LOVAZA  Take 2 g by mouth 2 (two) times daily.        STOP taking these medications    apixaban 5 mg Tab  Commonly known as: ELIQUIS     aspirin 81 MG EC tablet  Commonly known as: ECOTRIN  Replaced by: aspirin 325 MG tablet     benazepriL 40 MG tablet  Commonly known as: LOTENSIN     hydroCHLOROthiazide 25 MG tablet  Commonly known as: HYDRODIURIL     meloxicam 15 MG tablet  Commonly known as: MOBIC     metoprolol succinate 25 MG 24 hr tablet  Commonly known as: TOPROL-XL     omeprazole 40 MG capsule  Commonly known as: PRILOSEC     SUPREP BOWEL PREP KIT 17.5-3.13-1.6 gram Solr  Generic drug: sodium,potassium,mag sulfates          Time spent on the discharge of patient: 30 minutes    Laureen Ignacio PA-C  Cardiothoracic Surgery  [facility] - Cardiology Stepdown

## 2022-08-16 NOTE — NURSING
Pt dcd to home via transport accompanied by leonor. At time of d/c she had no complaints, was aox4 and verbalized understanding of sternal precautions, also verbalized understanding of other discharge instructions, iv sited dcd-no comps at site

## 2022-08-17 ENCOUNTER — PATIENT OUTREACH (OUTPATIENT)
Dept: ADMINISTRATIVE | Facility: CLINIC | Age: 62
End: 2022-08-17
Payer: MEDICAID

## 2022-08-17 ENCOUNTER — TELEPHONE (OUTPATIENT)
Dept: CARDIOTHORACIC SURGERY | Facility: CLINIC | Age: 62
End: 2022-08-17
Payer: MEDICAID

## 2022-08-17 NOTE — PROGRESS NOTES
C3 nurse spoke with Magalys Bass for a TCC post hospital discharge follow up call. The patient has a scheduled [facility] appointment with Yonathan Brewer MD on 08/23/2022 @ 1100, Non-[facility] PCP.

## 2022-08-17 NOTE — TELEPHONE ENCOUNTER
Called pt following hospital discharge.  Reiterated the need for pt to clean her incision everyday with soap and water, and to walk as much as she can.  Reminded pt not to drive for the first 4 weeks after surgery, and to refrain from lifting, pushing, and pulling anything greater than 5 pounds for the first 6 weeks following her surgery.  Instructed pt to perform daily weights.  Pt instructed to notify the clinic if she has a weight gain greater than 3 pounds in one day.  Pt instructed to call the clinic with any questions or concerns.  Pt verbalized understanding.

## 2022-08-18 NOTE — ADDENDUM NOTE
Addendum  created 08/18/22 1009 by Tal Knight Jr., MD    Attestation recorded in Intraprocedure, Intraprocedure Attestations filed

## 2022-09-02 ENCOUNTER — CLINICAL SUPPORT (OUTPATIENT)
Dept: REHABILITATION | Facility: OTHER | Age: 62
End: 2022-09-02
Payer: MEDICAID

## 2022-09-02 ENCOUNTER — TELEPHONE (OUTPATIENT)
Dept: CARDIOTHORACIC SURGERY | Facility: CLINIC | Age: 62
End: 2022-09-02
Payer: MEDICAID

## 2022-09-02 DIAGNOSIS — R53.1 GENERAL WEAKNESS: ICD-10-CM

## 2022-09-02 DIAGNOSIS — Z98.890 S/P MVR (MITRAL VALVE REPAIR): ICD-10-CM

## 2022-09-02 DIAGNOSIS — R29.898 WEAKNESS OF BOTH LOWER EXTREMITIES: ICD-10-CM

## 2022-09-02 PROCEDURE — 97110 THERAPEUTIC EXERCISES: CPT | Mod: PN

## 2022-09-02 PROCEDURE — 97162 PT EVAL MOD COMPLEX 30 MIN: CPT | Mod: PN

## 2022-09-02 RX ORDER — DOXYCYCLINE 100 MG/1
100 CAPSULE ORAL EVERY 12 HOURS
Qty: 20 CAPSULE | Refills: 0 | Status: SHIPPED | OUTPATIENT
Start: 2022-09-02 | End: 2022-09-13

## 2022-09-02 NOTE — PLAN OF CARE
[facility]  Physical Therapy Initial Evaluation    Name: Magalys Bass  Clinic Number: [id number]    Therapy Diagnosis:   Encounter Diagnosis   Name Primary?    S/P MVR (mitral valve repair)      Physician: Laureen Ignacio, *    Physician Orders: Physical Therapy Evaluate and Treat  Medical Diagnosis from Referral: S/P MVR (mitral valve repair) [Z98.890]  Evaluation Date: 9/2/2022  Authorization Period Expiration: 8/16/23  Plan of Care Expiration: 9/2/2022 to 10/2/22  Visit # / Visits authorized: 1/1 (pending additional authorization following initial evaluation)     Time In: 12:00pm  Time Out: 01:01pm  Total Billable Time: 61 minutes    Precautions: No Pushing, pulling, lifting x6 weeks    S/P Mitral Valve Repair on 8/10/22    Follow up with MD: 9/8/22    Subjective     Date of onset: Pt S/P Mitral Valve Repair on 8/10/22    History of current condition - Magalys reports: that she underwent a mitral valve repair on 8/10/22. Pt reports that she stayed x1 week in the hospital and has been discharged home for 2 weeks. Pt now presents to our office for PT tx. Pt plans to participate in cardiac rehab after her follow appointment with MD on 9/8/22. Pt is now at home with her family follow all post op precautions (no pushing, pulling, or lifting). Pt lives with her  and daughter who care for her. She also has sisters and brothers who come and assist her as well. 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 Pt would like to return to doing all of these things. Pt admits that she forgot to take her medication this morning. PT educated pt on importance of compliance with medication.      Medical History:   Past Medical History:   Diagnosis Date    Anticoagulant long-term use     Depression     GERD (gastroesophageal reflux disease)     Hypertension     Medical history non-contributory     Paroxysmal A-fib        Surgical History:    Magalys Bass  has a past surgical history that includes Knee arthroscopy; Shoulder arthroscopy; Shoulder surgery; Finger surgery (Left); Gallbladder surgery (08/2021); Coronary angiography (N/A, 4/13/2022); Tricuspid valvuloplasty (N/A, 8/10/2022); and Thayer maze procedure (N/A, 8/10/2022).    Medications:   Magalys has a current medication list which includes the following prescription(s): acetaminophen, amlodipine, aspirin, diclofenac sodium, docusate sodium, ergocalciferol, famotidine, fluticasone propionate, furosemide, omega-3 acid ethyl esters, polyethylene glycol, potassium chloride sa, [DISCONTINUED] benazepril, [DISCONTINUED] hydrochlorothiazide, [DISCONTINUED] metoprolol succinate, and [DISCONTINUED] omeprazole, and the following Facility-Administered Medications: sodium chloride 0.9%.    Allergies:   Review of patient's allergies indicates:   Allergen Reactions    Morphine Itching        Imaging:     FINDINGS:  Postoperative changes of the chest.  Sternotomy wires appear intact.  Cardiac valve replacements noted.  Surgical drains project over the mediastinum and right hemithorax.  Interval removal of right jugular central venous catheter.     Mediastinal structures are midline.  Stable enlarged cardiac silhouette.     Bilateral low lung volumes, similar to prior.  Stable central pulmonary venous congestion.  Slightly improved bilateral perihilar interstitial opacities.  No new focal airspace opacity.  Stable blunting of the bilateral costophrenic angles suggestive for small effusions and or atelectasis.  No pneumothorax.    Prior Therapy: [place] this [place]  Social History: Pt lives with her family who assists her as needed. She has 5 TANYA her home with HR on R. Pt has a tub and grab bars in her bathroom.   Occupation: Retired   Prior Level of Function: Pt able to tolerate walking and daily activities with seated rest  "breaks.  Current Level of Function: S/P Mitral Valve repair 8/10/22    Pain:  Current 0/10, worst 5/10, best 0/10   Location: central  chest at site of incision   Description: Sharp  Aggravating Factors: pressure from pillow  Easing Factors: rest    Pts goals: Pt would like to return to walking and helping others with no increase in chest pain.    Objective     WNL=within normal limits  WFL=within functional limits  NT=not tested  *=pain  Heart Rate: 82 bpm seated during objective  Sp02: 100%    Blood Pressure: 138/90- pt did not take her medication this morning    Posture:    Forward head, rounded shoulders     Pt's incision gaping one inch above and two inches below central stitch. PT contacted LEONA Burdick who scheduled pt for appointment on Tuesday and call in antibiotics for patient.          Strength:  Shoulder Right Left   Flexion 3/5 3/5   Scaption 3/5 3/5   ER 2/5 3/5   IR 3/5 2/5   Upper trap 3/5 2/5   Middle Trapezius 3/5 3/5   Lower Trapezius 3-/5 3/5   Serratus Anterior 3-/5 3/5   Rhomboids 3+/5 3/5         CMS Impairment/Limitation/Restriction for FOTO Survey    Therapist reviewed FOTO scores for Magalys Bass on 9/2/2022.   FOTO documents entered into [employer] - see Media section.    Limitation Score: 50%  Predicted Goal: 34%    Category: Mobility     TREATMENT     Treatment Time In: 1200pm  Treatment Time Out: 0110pm  Total Treatment time separate from Evaluation: 70 minutes    Therapeutic Exercises were provided for 32 minutes to improve strength and AROM including:  Seated ball squeezes 20x 5" hold  Seated Marches 3x10 repetitions  Seated LAQ 3x10 repetitions  Seated Clams vs brian tb 3x10 repetitions  Pt education on HEP, compliance with medication, and caring for incision until pt sees physician for follow up          Home Exercises and Patient Education Provided:    Education provided:   - Findings; prognosis and plan of care (POC)  - Home exercise program (HEP)  - Modality " options  - Therapist contact information    Written Home Exercises Provided: Yes  Exercises were reviewed and Magalys was able to demonstrate them prior to the end of the session.  Magalys demonstrated good understanding of the education provided.     See EMR under Patient Instructions for exercises provided today.    Assessment     Magalys is a 61 y.o. female referred to outpatient Physical Therapy with a medical diagnosis of S/P MVR (mitral valve repair) [Z98.890]. Pt presents to PT with pain, decreased shoulder ROM, decreased strength and flexibility, poor posture, and functional deficits with ADLs. These deficits are negatively impacting this patient's ability to complete their work duties and activities of daily living.     Pt prognosis is Good.   Pt will benefit from skilled outpatient Physical Therapy to address the deficits stated above and in the chart below, provide pt/family education, and to maximize pt's level of independence.     Plan of care discussed with patient: Yes  Pt's spiritual, cultural and educational needs considered and pt agreeable to plan of care and goals as stated below:     Anticipated Barriers for therapy: None    Medical Necessity is demonstrated by the following  History  Co-morbidities and personal factors that may impact the plan of care Co-morbidities:   advanced age and HTN    Personal Factors:   lifestyle     moderate   Examination  Body Structures and Functions, activity limitations and participation restrictions that may impact the plan of care Body Regions:   upper extremities  trunk    Body Systems:    gross symmetry  ROM  strength  gross coordinated movement  heart rate  blood pressure    Participation Restrictions:   Walking    Activity limitations:   Learning and applying knowledge  no deficits    General Tasks and Commands  No Deficits    Communication  No Deficits    Mobility  lifting and carrying objects  moving around using equipment (WC)    Self care  washing  oneself (bathing, drying, washing hands)  caring for body parts (brushing teeth, shaving, grooming)    Domestic Life  No Deficits    Interactions/Relationships  No Deficits    Life Areas  No Deficits    Community and Social Life  No Deficits         moderate   Clinical Presentation evolving clinical presentation with changing clinical characteristics moderate   Decision Making/ Complexity Score: moderate     GOALS:  Provided pt with HEP today. Discharge pt with HEP to cardiac rehab for continued therapy.       Plan     Plan of care Certification: 9/2/2022     Provided pt with HEP today. Discharge pt with HEP to cardiac rehab for continued therapy.     Keyla Nowak, PT,  DPT, OCS

## 2022-09-02 NOTE — TELEPHONE ENCOUNTER
Per LEONA Zepeda, called and notified pt of her post-op appts being moved to 9/6 from 9/8, due to a poorly healing surgical wound, which needs to be evaluated.  Pt notified to have her CXR and EKG performed on 9/6, prior to seeing Dr. Morris in clinic at 9:45, which she verbalized understanding to.  Per LEONA Zepeda, pt instructed to wash her surgical incisions everyday with soap and water, and pat them dry with a clean towel, and then apply a clean gauze dressing to her mid-sternal incision each day, which pt verbalized understanding to.  Pt instructed to refrain from applying anything else to her surgical incisions, other than soap and water daily, which she verbalized understanding to.  Pt notified of a prescription for Doxycycline, to be taken every 12 hours for 10 days that was sent to her [facility] and the need for pt to start taking this today, which she verbalized understanding to.  Pt instructed to contact the clinic with any questions or concerns which she verbalized understanding to.

## 2022-09-06 ENCOUNTER — TELEPHONE (OUTPATIENT)
Dept: ELECTROPHYSIOLOGY | Facility: CLINIC | Age: 62
End: 2022-09-06
Payer: MEDICAID

## 2022-09-06 ENCOUNTER — HOSPITAL ENCOUNTER (OUTPATIENT)
Dept: CARDIOLOGY | Facility: CLINIC | Age: 62
Discharge: HOME | End: 2022-09-06
Payer: MEDICAID

## 2022-09-06 ENCOUNTER — OFFICE VISIT (OUTPATIENT)
Dept: CARDIOTHORACIC SURGERY | Facility: CLINIC | Age: 62
End: 2022-09-06
Payer: MEDICAID

## 2022-09-06 ENCOUNTER — HOSPITAL ENCOUNTER (OUTPATIENT)
Dept: RADIOLOGY | Facility: HOSPITAL | Age: 62
Discharge: HOME | End: 2022-09-06
Attending: THORACIC SURGERY (CARDIOTHORACIC VASCULAR SURGERY)
Payer: MEDICAID

## 2022-09-06 ENCOUNTER — DOCUMENTATION ONLY (OUTPATIENT)
Dept: CARDIOTHORACIC SURGERY | Facility: CLINIC | Age: 62
End: 2022-09-06

## 2022-09-06 VITALS
HEIGHT: 65 IN | BODY MASS INDEX: 46.08 KG/M2 | DIASTOLIC BLOOD PRESSURE: 79 MMHG | RESPIRATION RATE: 18 BRPM | SYSTOLIC BLOOD PRESSURE: 128 MMHG | OXYGEN SATURATION: 99 % | HEART RATE: 87 BPM | WEIGHT: 276.56 LBS

## 2022-09-06 DIAGNOSIS — Z98.890 S/P TVR (TRICUSPID VALVE REPAIR): ICD-10-CM

## 2022-09-06 DIAGNOSIS — Z98.890 H/O MAZE PROCEDURE: ICD-10-CM

## 2022-09-06 DIAGNOSIS — Z98.890 S/P MITRAL VALVE REPAIR: Primary | ICD-10-CM

## 2022-09-06 DIAGNOSIS — Z98.890 S/P MITRAL VALVE REPAIR: ICD-10-CM

## 2022-09-06 DIAGNOSIS — I48.92 ATRIAL FLUTTER, UNSPECIFIED TYPE: Primary | ICD-10-CM

## 2022-09-06 DIAGNOSIS — Z98.890 S/P TRICUSPID VALVE REPAIR: ICD-10-CM

## 2022-09-06 PROCEDURE — 71046 X-RAY EXAM CHEST 2 VIEWS: CPT | Mod: TC,FY

## 2022-09-06 PROCEDURE — 1159F MED LIST DOCD IN RCRD: CPT | Mod: CPTII,,, | Performed by: THORACIC SURGERY (CARDIOTHORACIC VASCULAR SURGERY)

## 2022-09-06 PROCEDURE — 3078F PR MOST RECENT DIASTOLIC BLOOD PRESSURE < 80 MM HG: ICD-10-PCS | Mod: CPTII,,, | Performed by: THORACIC SURGERY (CARDIOTHORACIC VASCULAR SURGERY)

## 2022-09-06 PROCEDURE — 93010 EKG 12-LEAD: ICD-10-PCS | Mod: S$PBB,,, | Performed by: INTERNAL MEDICINE

## 2022-09-06 PROCEDURE — 3078F DIAST BP <80 MM HG: CPT | Mod: CPTII,,, | Performed by: THORACIC SURGERY (CARDIOTHORACIC VASCULAR SURGERY)

## 2022-09-06 PROCEDURE — 99024 PR POST-OP FOLLOW-UP VISIT: ICD-10-PCS | Mod: ,,, | Performed by: THORACIC SURGERY (CARDIOTHORACIC VASCULAR SURGERY)

## 2022-09-06 PROCEDURE — 71046 X-RAY EXAM CHEST 2 VIEWS: CPT | Mod: 26,,, | Performed by: STUDENT IN AN ORGANIZED HEALTH CARE EDUCATION/TRAINING PROGRAM

## 2022-09-06 PROCEDURE — 3008F BODY MASS INDEX DOCD: CPT | Mod: CPTII,,, | Performed by: THORACIC SURGERY (CARDIOTHORACIC VASCULAR SURGERY)

## 2022-09-06 PROCEDURE — 99214 OFFICE O/P EST MOD 30 MIN: CPT | Mod: PBBFAC,25 | Performed by: THORACIC SURGERY (CARDIOTHORACIC VASCULAR SURGERY)

## 2022-09-06 PROCEDURE — 3044F PR MOST RECENT HEMOGLOBIN A1C LEVEL <7.0%: ICD-10-PCS | Mod: CPTII,,, | Performed by: THORACIC SURGERY (CARDIOTHORACIC VASCULAR SURGERY)

## 2022-09-06 PROCEDURE — 3008F PR BODY MASS INDEX (BMI) DOCUMENTED: ICD-10-PCS | Mod: CPTII,,, | Performed by: THORACIC SURGERY (CARDIOTHORACIC VASCULAR SURGERY)

## 2022-09-06 PROCEDURE — 3074F PR MOST RECENT SYSTOLIC BLOOD PRESSURE < 130 MM HG: ICD-10-PCS | Mod: CPTII,,, | Performed by: THORACIC SURGERY (CARDIOTHORACIC VASCULAR SURGERY)

## 2022-09-06 PROCEDURE — 93010 ELECTROCARDIOGRAM REPORT: CPT | Mod: S$PBB,,, | Performed by: INTERNAL MEDICINE

## 2022-09-06 PROCEDURE — 1159F PR MEDICATION LIST DOCUMENTED IN MEDICAL RECORD: ICD-10-PCS | Mod: CPTII,,, | Performed by: THORACIC SURGERY (CARDIOTHORACIC VASCULAR SURGERY)

## 2022-09-06 PROCEDURE — 71046 XR CHEST PA AND LATERAL: ICD-10-PCS | Mod: 26,,, | Performed by: STUDENT IN AN ORGANIZED HEALTH CARE EDUCATION/TRAINING PROGRAM

## 2022-09-06 PROCEDURE — 99024 POSTOP FOLLOW-UP VISIT: CPT | Mod: ,,, | Performed by: THORACIC SURGERY (CARDIOTHORACIC VASCULAR SURGERY)

## 2022-09-06 PROCEDURE — 99999 PR PBB SHADOW E&M-EST. PATIENT-LVL IV: ICD-10-PCS | Mod: PBBFAC,,, | Performed by: THORACIC SURGERY (CARDIOTHORACIC VASCULAR SURGERY)

## 2022-09-06 PROCEDURE — 99999 PR PBB SHADOW E&M-EST. PATIENT-LVL IV: CPT | Mod: PBBFAC,,, | Performed by: THORACIC SURGERY (CARDIOTHORACIC VASCULAR SURGERY)

## 2022-09-06 PROCEDURE — 3074F SYST BP LT 130 MM HG: CPT | Mod: CPTII,,, | Performed by: THORACIC SURGERY (CARDIOTHORACIC VASCULAR SURGERY)

## 2022-09-06 PROCEDURE — 93005 ELECTROCARDIOGRAM TRACING: CPT | Mod: PBBFAC | Performed by: INTERNAL MEDICINE

## 2022-09-06 PROCEDURE — 3044F HG A1C LEVEL LT 7.0%: CPT | Mod: CPTII,,, | Performed by: THORACIC SURGERY (CARDIOTHORACIC VASCULAR SURGERY)

## 2022-09-06 RX ORDER — NITROFURANTOIN 25; 75 MG/1; MG/1
100 CAPSULE ORAL EVERY 12 HOURS
Status: ON HOLD | COMMUNITY
Start: 2022-07-20 | End: 2023-05-10 | Stop reason: CLARIF

## 2022-09-06 RX ORDER — IBUPROFEN 600 MG/1
600 TABLET ORAL EVERY 6 HOURS PRN
Status: ON HOLD | COMMUNITY
Start: 2022-07-20 | End: 2023-05-10

## 2022-09-06 RX ORDER — FUROSEMIDE 20 MG/1
20 TABLET ORAL DAILY PRN
COMMUNITY
Start: 2022-08-29

## 2022-09-06 RX ORDER — HYDROCHLOROTHIAZIDE 25 MG/1
25 TABLET ORAL DAILY
COMMUNITY
Start: 2022-08-16

## 2022-09-06 NOTE — TELEPHONE ENCOUNTER
Received message from Dr. Morris's office referring patient to EP. I called the patient to schedule an appointment. She did not answer and I was not able to leave a message. I will try patient again.

## 2022-09-06 NOTE — PROGRESS NOTES
Pt not eligible for phase II cardiac rehab. Pt instructed to follow-up with her cardiologist.  Pt reminded to continue with her 5 pound lifting, pushing, and pulling restrictions for two more weeks, and that she can then advance to lifting, pushing, and pulling up to 20 pounds for 6 weeks, for a total of 12 weeks of restrictions.  Pt reminded to continue washing her incisions everyday with soap and water and provided with supplies and instructions on doing a wet to dry dressing change on her incision. Referral to EP placed for evaluation of atrial flutter.  Pt was provided with a copy of her AVS, and instructed on medication changes.  Pt verbalized understanding of all instructions.

## 2022-09-06 NOTE — PROGRESS NOTES
Patient seen and examined. Patient is progressively increasing activity. No significant complaints. Patient's HH nurse contacted us on Friday with concerns regarding the incision. Doxy was sent and appointment was moved up. Patient states that it has drained some fluid. Denies any fever or chills.      Sternum: stable, incision CDI  Chest xray: Acceptable post op chest  EKG: A Flutter         Assessment:  S/P MVr / TVr / MAZE 8/10/22     Plan:  Can begin driving as long as he has power steering  Can begin cardiac rehab in the next couple of weeks  We will refer to cardiology to assume care   DC lasix  DC potassium  Will refer to EP for Aflutter       No scheduled appointment, RTC prn    CTS Attending Note:    I have personally taken the history and examined this patient and agree with the VIDA's note as stated above.  Very pleasant 61-year-old woman who is roughly 1 month out from mitral valve repair, tricuspid valve repair, and Maze.  She reports less dyspnea than preoperatively.  Her pre-discharge echo demonstrated some degree of mitral stenosis, however the intraoperative echo had demonstrated a mean gradient of 2 and a peak gradient of 4.  I think her stenosis was likely artifactual, possibly due to ongoing volume overload at the time of the echo.  She has rate controlled atrial flutter.  I discussed with her that the lesions created at the time of the Maze require 6 months to mature.  We will make arrangements for her to be seen by electrophysiology.  Lastly, she does have some superficial wound dehiscence.  We started her on antibiotics, and will give her supplies for wet to dry dressings.

## 2022-09-06 NOTE — TELEPHONE ENCOUNTER
Spoke with patient regarding EP referral. Offered her an appointment for tomorrow 9/7/22. Patient stated she could not come tomorrow and asked for something next week. I scheduled her with Dr. Low on 9/13/22 at 9:40am. Patient agreed to appointment. Appointment slip mailed to patient.

## 2022-09-06 NOTE — LETTER
September 6, 2022        Winifred Matute MD  [address]  [facility] [address]             [facility]  [address]  [facility] [address]  Phone: [phone]   Patient: Magalys Bass   MR Number: [id number]   YOB: 1960   Date of Visit: 9/6/2022       Dear Dr. Matute:    Thank you for referring Magalys Bass to me for evaluation. Below are the relevant portions of my assessment and plan of care.            If you have questions, please do not hesitate to call me. I look forward to following Magalys along with you.    Sincerely,      Manish Morris MD           CC    No Recipients

## 2022-09-13 ENCOUNTER — OFFICE VISIT (OUTPATIENT)
Dept: ELECTROPHYSIOLOGY | Facility: CLINIC | Age: 62
End: 2022-09-13
Payer: MEDICAID

## 2022-09-13 ENCOUNTER — HOSPITAL ENCOUNTER (OUTPATIENT)
Dept: CARDIOLOGY | Facility: CLINIC | Age: 62
Discharge: HOME | End: 2022-09-13
Payer: MEDICAID

## 2022-09-13 VITALS
SYSTOLIC BLOOD PRESSURE: 122 MMHG | WEIGHT: 278.25 LBS | HEIGHT: 65 IN | HEART RATE: 94 BPM | DIASTOLIC BLOOD PRESSURE: 88 MMHG | BODY MASS INDEX: 46.36 KG/M2

## 2022-09-13 DIAGNOSIS — Z98.890 S/P TRICUSPID VALVE REPAIR: ICD-10-CM

## 2022-09-13 DIAGNOSIS — Z98.890 S/P MITRAL VALVE REPAIR: ICD-10-CM

## 2022-09-13 DIAGNOSIS — I48.92 ATRIAL FLUTTER, UNSPECIFIED TYPE: ICD-10-CM

## 2022-09-13 DIAGNOSIS — Z98.890 S/P TVR (TRICUSPID VALVE REPAIR): Primary | ICD-10-CM

## 2022-09-13 PROCEDURE — 93010 ELECTROCARDIOGRAM REPORT: CPT | Mod: S$PBB,,, | Performed by: INTERNAL MEDICINE

## 2022-09-13 PROCEDURE — 3079F PR MOST RECENT DIASTOLIC BLOOD PRESSURE 80-89 MM HG: ICD-10-PCS | Mod: CPTII,,, | Performed by: INTERNAL MEDICINE

## 2022-09-13 PROCEDURE — 1160F PR REVIEW ALL MEDS BY PRESCRIBER/CLIN PHARMACIST DOCUMENTED: ICD-10-PCS | Mod: CPTII,,, | Performed by: INTERNAL MEDICINE

## 2022-09-13 PROCEDURE — 99205 OFFICE O/P NEW HI 60 MIN: CPT | Mod: S$PBB,,, | Performed by: INTERNAL MEDICINE

## 2022-09-13 PROCEDURE — 1160F RVW MEDS BY RX/DR IN RCRD: CPT | Mod: CPTII,,, | Performed by: INTERNAL MEDICINE

## 2022-09-13 PROCEDURE — 93005 ELECTROCARDIOGRAM TRACING: CPT | Mod: PBBFAC | Performed by: INTERNAL MEDICINE

## 2022-09-13 PROCEDURE — 3008F PR BODY MASS INDEX (BMI) DOCUMENTED: ICD-10-PCS | Mod: CPTII,,, | Performed by: INTERNAL MEDICINE

## 2022-09-13 PROCEDURE — 1111F PR DISCHARGE MEDS RECONCILED W/ CURRENT OUTPATIENT MED LIST: ICD-10-PCS | Mod: CPTII,,, | Performed by: INTERNAL MEDICINE

## 2022-09-13 PROCEDURE — 3079F DIAST BP 80-89 MM HG: CPT | Mod: CPTII,,, | Performed by: INTERNAL MEDICINE

## 2022-09-13 PROCEDURE — 3008F BODY MASS INDEX DOCD: CPT | Mod: CPTII,,, | Performed by: INTERNAL MEDICINE

## 2022-09-13 PROCEDURE — 99999 PR PBB SHADOW E&M-EST. PATIENT-LVL IV: ICD-10-PCS | Mod: PBBFAC,,, | Performed by: INTERNAL MEDICINE

## 2022-09-13 PROCEDURE — 93010 RHYTHM STRIP: ICD-10-PCS | Mod: S$PBB,,, | Performed by: INTERNAL MEDICINE

## 2022-09-13 PROCEDURE — 3044F PR MOST RECENT HEMOGLOBIN A1C LEVEL <7.0%: ICD-10-PCS | Mod: CPTII,,, | Performed by: INTERNAL MEDICINE

## 2022-09-13 PROCEDURE — 3074F PR MOST RECENT SYSTOLIC BLOOD PRESSURE < 130 MM HG: ICD-10-PCS | Mod: CPTII,,, | Performed by: INTERNAL MEDICINE

## 2022-09-13 PROCEDURE — 1111F DSCHRG MED/CURRENT MED MERGE: CPT | Mod: CPTII,,, | Performed by: INTERNAL MEDICINE

## 2022-09-13 PROCEDURE — 1159F PR MEDICATION LIST DOCUMENTED IN MEDICAL RECORD: ICD-10-PCS | Mod: CPTII,,, | Performed by: INTERNAL MEDICINE

## 2022-09-13 PROCEDURE — 99214 OFFICE O/P EST MOD 30 MIN: CPT | Mod: PBBFAC | Performed by: INTERNAL MEDICINE

## 2022-09-13 PROCEDURE — 99999 PR PBB SHADOW E&M-EST. PATIENT-LVL IV: CPT | Mod: PBBFAC,,, | Performed by: INTERNAL MEDICINE

## 2022-09-13 PROCEDURE — 1159F MED LIST DOCD IN RCRD: CPT | Mod: CPTII,,, | Performed by: INTERNAL MEDICINE

## 2022-09-13 PROCEDURE — 3044F HG A1C LEVEL LT 7.0%: CPT | Mod: CPTII,,, | Performed by: INTERNAL MEDICINE

## 2022-09-13 PROCEDURE — 99205 PR OFFICE/OUTPT VISIT, NEW, LEVL V, 60-74 MIN: ICD-10-PCS | Mod: S$PBB,,, | Performed by: INTERNAL MEDICINE

## 2022-09-13 PROCEDURE — 3074F SYST BP LT 130 MM HG: CPT | Mod: CPTII,,, | Performed by: INTERNAL MEDICINE

## 2022-09-13 NOTE — PROGRESS NOTES
Subjective:    Patient ID:  Magalys Bass is a 61 y.o. female who presents for follow-up of Atrial Fibrillation      61 yoF chronic diastolic HF (EF 60%), pAF (on Eliquis), HTN, HLD, obesity s/p gastric sleeve here for arrhythmia evaluation. She developed AF prior to 4/22 (last ECG was 2014, NSR). She had restrictive CM, MR and TR leading to MVR/TVR/Maze/LUCIA resection 8/10/22. Since her surgery she has had return of coarse AF and atypical AFL. She feels rapid palpitations at times. Overall she has recovered well since her surgery. EF 58% post-op. She denies syncope, near syncope, chest pain, SOB.     Echo 8/22:  · The left ventricle is normal in size with concentric remodeling and normal systolic function.  · The estimated ejection fraction is 58%.  · There is abnormal septal wall motion.  · Severe left atrial enlargement.  · Mild right ventricular enlargement with low normal right ventricular systolic function.  · The mitral valve is s/p repair.  · There is moderate to severe mitral stenosis.  · The mean diastolic gradient across the mitral valve is 10 mmHg at a heart rate of 69 bpm; MVA 1.26.  · Normal central venous pressure (3 mmHg).  · Trivial posterolateral pericardial effusion.    Past Medical History:  No date: Anticoagulant long-term use  No date: Depression  No date: GERD (gastroesophageal reflux disease)  No date: Hypertension  No date: Medical history non-contributory  No date: Paroxysmal A-fib    Past Surgical History:  4/13/2022: CORONARY ANGIOGRAPHY; N/A      Comment:  Procedure: ANGIOGRAM, CORONARY ARTERY;  Surgeon: Isauro Perdomo MD;  Location: [facility] CATH LAB;  Service:                Cardiology;  Laterality: N/A;  8/10/2022: KIRKPATRICK MAZE PROCEDURE; N/A      Comment:  Procedure: KIRKPATRICK MAZE PROCEDURE;  Surgeon: Manish Morris MD;  Location: [facility] OR [address] [place] [address];  Service:                Cardiothoracic;  Laterality: N/A;  Maze  No date: FINGER SURGERY; Left       Comment:  index fracture repair  08/2021: GALLBLADDER SURGERY  No date: KNEE ARTHROSCOPY  No date: SHOULDER ARTHROSCOPY  No date: SHOULDER SURGERY  8/10/2022: TRICUSPID VALVULOPLASTY; N/A      Comment:  Procedure: REPAIR, TRICUSPID VALVE;  Surgeon: Manish Morris MD;  Location[facility] [address] [place] [address];  Service:                Cardiothoracic;  Laterality: N/A;    Social History    Socioeconomic History      Marital status:     Tobacco Use      Smoking status: Former        Types: Cigarettes      Smokeless tobacco: Never    Substance and Sexual Activity      Alcohol use: No      Drug use: No      Sexual activity: Not Currently    Social History Narrative      ** Merged History Encounter **             Review of patient's family history indicates:      Current Outpatient Medications:  acetaminophen (TYLENOL) 500 MG tablet, Take 2 tablets (1,000 mg total) by mouth every 6 (six) hours as needed for Pain., Disp: 30 tablet, Rfl: 0  amLODIPine (NORVASC) 2.5 MG tablet, Take 1 tablet (2.5 mg total) by mouth once daily., Disp: 30 tablet, Rfl: 11  aspirin 325 MG tablet, Take 1 tablet (325 mg total) by mouth once daily., Disp: 30 tablet, Rfl: 11  docusate sodium (COLACE) 100 MG capsule, Take 1 capsule (100 mg total) by mouth 2 (two) times daily as needed for Constipation., Disp: 30 capsule, Rfl: 0  doxycycline (VIBRAMYCIN) 100 MG Cap, Take 1 capsule (100 mg total) by mouth every 12 (twelve) hours. for 10 days, Disp: 20 capsule, Rfl: 0  ergocalciferol (ERGOCALCIFEROL) 50,000 unit Cap, Take 50,000 Units by mouth every 7 days., Disp: , Rfl:   fluticasone propionate (FLONASE) 50 mcg/actuation nasal spray, 1 spray by Each Nostril route once daily., Disp: , Rfl:   furosemide (LASIX) 20 MG tablet, Take 20 mg by mouth daily as needed., Disp: , Rfl:   hydroCHLOROthiazide (HYDRODIURIL) 25 MG tablet, Take 25 mg by mouth once daily., Disp: , Rfl:   potassium chloride SA (K-DUR,KLOR-CON) 20 MEQ tablet, Take 2 tablets (40  mEq total) by mouth 2 (two) times daily for 7 days then decrease to 2 tablets by mouth (40mEq) daily, Disp: 30 tablet, Rfl: 3  diclofenac sodium (VOLTAREN) 1 % Gel, Apply topically., Disp: , Rfl:   ibuprofen (ADVIL,MOTRIN) 600 MG tablet, Take 600 mg by mouth every 6 (six) hours as needed., Disp: , Rfl:   nitrofurantoin, macrocrystal-monohydrate, (MACROBID) 100 MG capsule, Take 100 mg by mouth every 12 (twelve) hours., Disp: , Rfl:   omega-3 acid ethyl esters (LOVAZA) 1 gram capsule, Take 2 g by mouth 2 (two) times daily., Disp: , Rfl:     No current facility-administered medications for this visit.  Facility-Administered Medications Ordered in Other Visits:  0.9%  NaCl infusion, , Intravenous, Continuous, Isauro Perdomo MD            Review of Systems   Constitutional: Negative.   HENT: Negative.     Eyes: Negative.    Cardiovascular:  Positive for irregular heartbeat and palpitations. Negative for chest pain, dyspnea on exertion, leg swelling, near-syncope and syncope.   Respiratory: Negative.  Negative for shortness of breath.    Endocrine: Negative.    Hematologic/Lymphatic: Negative.    Skin: Negative.    Musculoskeletal: Negative.    Gastrointestinal: Negative.    Genitourinary: Negative.    Neurological: Negative.  Negative for dizziness and light-headedness.   Psychiatric/Behavioral: Negative.     Allergic/Immunologic: Negative.       Objective:    Physical Exam  Vitals reviewed.   Constitutional:       General: She is not in acute distress.     Appearance: She is well-developed.   HENT:      Head: Normocephalic and atraumatic.   Eyes:      Pupils: Pupils are equal, round, and reactive to light.   Neck:      Thyroid: No thyromegaly.      Vascular: No JVD.   Cardiovascular:      Rate and Rhythm: Normal rate. Rhythm irregular.      Chest Wall: PMI is not displaced.      Heart sounds: Normal heart sounds, S1 normal and S2 normal. No murmur heard.    No friction rub. No gallop.   Pulmonary:      Effort:  Pulmonary effort is normal. No respiratory distress.      Breath sounds: Normal breath sounds. No wheezing or rales.   Abdominal:      General: Bowel sounds are normal. There is no distension.      Palpations: Abdomen is soft.      Tenderness: There is no abdominal tenderness. There is no guarding or rebound.   Musculoskeletal:         General: No tenderness. Normal range of motion.      Cervical back: Normal range of motion.   Skin:     General: Skin is warm and dry.      Findings: No erythema or rash.   Neurological:      Mental Status: She is alert and oriented to person, place, and time.      Cranial Nerves: No cranial nerve deficit.   Psychiatric:         Behavior: Behavior normal.         Thought Content: Thought content normal.         Judgment: Judgment normal.     ECG: AF with variable V rate        Assessment:       1. S/P TVR (tricuspid valve repair)    2. S/P mitral valve repair    3. S/P tricuspid valve repair         Plan:       61 yoF TVR/MVR/Maze here for arrhythmia management. She has recurrence of AF that is symptomatic. I offered DCCV as well as ablation. She wishes to be done with her AF and is strongly considering ablation. If she undergoes CV, I would start AAD afterwards. Will await her decision. If neither ablation nor CV are desired, will see her in 3m.

## 2022-09-14 ENCOUNTER — TELEPHONE (OUTPATIENT)
Dept: ELECTROPHYSIOLOGY | Facility: CLINIC | Age: 62
End: 2022-09-14
Payer: MEDICAID

## 2022-09-14 NOTE — TELEPHONE ENCOUNTER
Spoke with patient. She was calling to get the name of the procedure that Dr Low offered yesterday. Advised that he discussed DCCV as well as PVI. She is considering the PVI and just wanted the name of the procedure. She will call back once she makes her decision.

## 2022-09-14 NOTE — TELEPHONE ENCOUNTER
----- Message from Heidi Pineda MA sent at 9/14/2022  1:06 PM CDT -----  Regarding: FW: please call    ----- Message -----  From: Catherine Vee  Sent: 9/14/2022  11:14 AM CDT  To: Devnate Jamil Staff  Subject: please call                                      The pt is calling to go over the procedure. Please call her back @ [phone]. Thanks, Catherine

## 2022-10-10 ENCOUNTER — TELEPHONE (OUTPATIENT)
Dept: ELECTROPHYSIOLOGY | Facility: CLINIC | Age: 62
End: 2022-10-10
Payer: MEDICAID

## 2022-10-10 NOTE — TELEPHONE ENCOUNTER
----- Message from Giselle Benavidez sent at 10/10/2022 10:32 AM CDT -----  Regarding: [facility] Procedure  Pt [phone] says she's waiting for a return call to [facility] her procedure.    Thanks

## 2022-10-10 NOTE — TELEPHONE ENCOUNTER
Spoke with Ms. Bass and scheduled her procedure for 11/21/22. Procedure details reviewed and instructions will be sent via mail as requested.

## 2022-10-27 DIAGNOSIS — I48.0 PAROXYSMAL ATRIAL FIBRILLATION: Primary | ICD-10-CM

## 2022-11-04 NOTE — ASSESSMENT & PLAN NOTE
BG goal 110-140 (CTS protocol)     Discontinue IV insulin infusion protocol (rate decreasing)   Start Moderate Dose Correction Scale   BG monitoring q 4 hrs while NPO     ** Please call Endocrine for any BG related issues **    ** Please notify Endocrine for any change and/or advance in diet**    Discharge planning: TBD       Encounter for palliative care

## 2022-11-14 ENCOUNTER — LAB VISIT (OUTPATIENT)
Dept: LAB | Facility: HOSPITAL | Age: 62
End: 2022-11-14
Attending: INTERNAL MEDICINE
Payer: MEDICAID

## 2022-11-14 DIAGNOSIS — I48.0 PAROXYSMAL ATRIAL FIBRILLATION: ICD-10-CM

## 2022-11-14 LAB
ANION GAP SERPL CALC-SCNC: 10 MMOL/L (ref 8–16)
APTT BLDCRRT: 26.2 SEC (ref 21–32)
BUN SERPL-MCNC: 12 MG/DL (ref 8–23)
CALCIUM SERPL-MCNC: 8.9 MG/DL (ref 8.7–10.5)
CHLORIDE SERPL-SCNC: 101 MMOL/L (ref 95–110)
CO2 SERPL-SCNC: 28 MMOL/L (ref 23–29)
CREAT SERPL-MCNC: 0.9 MG/DL (ref 0.5–1.4)
ERYTHROCYTE [DISTWIDTH] IN BLOOD BY AUTOMATED COUNT: 14.4 % (ref 11.5–14.5)
EST. GFR  (NO RACE VARIABLE): >60 ML/MIN/1.73 M^2
GLUCOSE SERPL-MCNC: 69 MG/DL (ref 70–110)
HCT VFR BLD AUTO: 32.8 % (ref 37–48.5)
HGB BLD-MCNC: 10 G/DL (ref 12–16)
INR PPP: 1.1 (ref 0.8–1.2)
MCH RBC QN AUTO: 23.2 PG (ref 27–31)
MCHC RBC AUTO-ENTMCNC: 30.5 G/DL (ref 32–36)
MCV RBC AUTO: 76 FL (ref 82–98)
PLATELET # BLD AUTO: 222 K/UL (ref 150–450)
PMV BLD AUTO: 11 FL (ref 9.2–12.9)
POTASSIUM SERPL-SCNC: 3.1 MMOL/L (ref 3.5–5.1)
PROTHROMBIN TIME: 10.9 SEC (ref 9–12.5)
RBC # BLD AUTO: 4.31 M/UL (ref 4–5.4)
SODIUM SERPL-SCNC: 139 MMOL/L (ref 136–145)
WBC # BLD AUTO: 4.22 K/UL (ref 3.9–12.7)

## 2022-11-14 PROCEDURE — 80048 BASIC METABOLIC PNL TOTAL CA: CPT | Performed by: INTERNAL MEDICINE

## 2022-11-14 PROCEDURE — 85610 PROTHROMBIN TIME: CPT | Performed by: INTERNAL MEDICINE

## 2022-11-14 PROCEDURE — 85027 COMPLETE CBC AUTOMATED: CPT | Performed by: INTERNAL MEDICINE

## 2022-11-14 PROCEDURE — 85730 THROMBOPLASTIN TIME PARTIAL: CPT | Performed by: INTERNAL MEDICINE

## 2022-11-14 PROCEDURE — 36415 COLL VENOUS BLD VENIPUNCTURE: CPT | Performed by: INTERNAL MEDICINE

## 2022-11-18 ENCOUNTER — TELEPHONE (OUTPATIENT)
Dept: ELECTROPHYSIOLOGY | Facility: CLINIC | Age: 62
End: 2022-11-18
Payer: MEDICAID

## 2022-11-18 NOTE — TELEPHONE ENCOUNTER
Spoke to Ms Bass    CONFIRMED procedure arrival time on 11/21/2022 at 5:15 am    Reiterated instructions including:  -Directions to check in desk  -NPO after midnight night prior to procedure  -Pre-procedure LABS completed and reviewed. Potassium 3.1. Will recheck morning of procedure.  -Confirmed no fever, cough, or shortness of breath in the past 30 days  -Confirmed no redness, rash, irritation, or yeast infection to groin area.   -Do not wear mascara day of procedure    Patient verbalized understanding of above and appreciated the call.

## 2022-11-21 ENCOUNTER — ANESTHESIA (OUTPATIENT)
Dept: MEDSURG UNIT | Facility: HOSPITAL | Age: 62
End: 2022-11-21
Payer: MEDICAID

## 2022-11-21 ENCOUNTER — HOSPITAL ENCOUNTER (OUTPATIENT)
Facility: HOSPITAL | Age: 62
Discharge: HOME | End: 2022-11-21
Attending: INTERNAL MEDICINE | Admitting: INTERNAL MEDICINE
Payer: MEDICAID

## 2022-11-21 ENCOUNTER — ANESTHESIA EVENT (OUTPATIENT)
Dept: MEDSURG UNIT | Facility: HOSPITAL | Age: 62
End: 2022-11-21
Payer: MEDICAID

## 2022-11-21 VITALS
BODY MASS INDEX: 44.65 KG/M2 | OXYGEN SATURATION: 99 % | DIASTOLIC BLOOD PRESSURE: 72 MMHG | HEIGHT: 65 IN | RESPIRATION RATE: 15 BRPM | SYSTOLIC BLOOD PRESSURE: 158 MMHG | WEIGHT: 268 LBS | HEART RATE: 68 BPM | TEMPERATURE: 98 F

## 2022-11-21 DIAGNOSIS — I48.91 ATRIAL FIBRILLATION: ICD-10-CM

## 2022-11-21 DIAGNOSIS — I48.0 PAROXYSMAL ATRIAL FIBRILLATION: Primary | ICD-10-CM

## 2022-11-21 DIAGNOSIS — I49.9 ARRHYTHMIA: ICD-10-CM

## 2022-11-21 LAB
ANION GAP SERPL CALC-SCNC: 9 MMOL/L (ref 8–16)
BUN SERPL-MCNC: 11 MG/DL (ref 8–23)
CALCIUM SERPL-MCNC: 8.7 MG/DL (ref 8.7–10.5)
CHLORIDE SERPL-SCNC: 102 MMOL/L (ref 95–110)
CO2 SERPL-SCNC: 28 MMOL/L (ref 23–29)
CREAT SERPL-MCNC: 0.8 MG/DL (ref 0.5–1.4)
EST. GFR  (NO RACE VARIABLE): >60 ML/MIN/1.73 M^2
GLUCOSE SERPL-MCNC: 92 MG/DL (ref 70–110)
POC ACTIVATED CLOTTING TIME K: 132 SEC (ref 74–137)
POC ACTIVATED CLOTTING TIME K: 138 SEC (ref 74–137)
POC ACTIVATED CLOTTING TIME K: 323 SEC (ref 74–137)
POC ACTIVATED CLOTTING TIME K: 329 SEC (ref 74–137)
POC ACTIVATED CLOTTING TIME K: 382 SEC (ref 74–137)
POC ACTIVATED CLOTTING TIME K: 387 SEC (ref 74–137)
POC ACTIVATED CLOTTING TIME K: 387 SEC (ref 74–137)
POTASSIUM SERPL-SCNC: 3 MMOL/L (ref 3.5–5.1)
SAMPLE: ABNORMAL
SAMPLE: NORMAL
SODIUM SERPL-SCNC: 139 MMOL/L (ref 136–145)

## 2022-11-21 PROCEDURE — 93655 ICAR CATH ABLTJ DSCRT ARRHYT: CPT | Performed by: INTERNAL MEDICINE

## 2022-11-21 PROCEDURE — D9220A PRA ANESTHESIA: ICD-10-PCS | Mod: CRNA,,, | Performed by: NURSE ANESTHETIST, CERTIFIED REGISTERED

## 2022-11-21 PROCEDURE — D9220A PRA ANESTHESIA: ICD-10-PCS | Mod: ANES,,, | Performed by: ANESTHESIOLOGY

## 2022-11-21 PROCEDURE — 37000009 HC ANESTHESIA EA ADD 15 MINS: Performed by: INTERNAL MEDICINE

## 2022-11-21 PROCEDURE — D9220A PRA ANESTHESIA: Mod: ANES,,, | Performed by: ANESTHESIOLOGY

## 2022-11-21 PROCEDURE — 63600175 PHARM REV CODE 636 W HCPCS: Performed by: NURSE ANESTHETIST, CERTIFIED REGISTERED

## 2022-11-21 PROCEDURE — C1753 CATH, INTRAVAS ULTRASOUND: HCPCS | Performed by: INTERNAL MEDICINE

## 2022-11-21 PROCEDURE — 93656 COMPRE EP EVAL ABLTJ ATR FIB: CPT | Performed by: INTERNAL MEDICINE

## 2022-11-21 PROCEDURE — C1766 INTRO/SHEATH,STRBLE,NON-PEEL: HCPCS | Performed by: INTERNAL MEDICINE

## 2022-11-21 PROCEDURE — 93656 COMPRE EP EVAL ABLTJ ATR FIB: CPT | Mod: ,,, | Performed by: INTERNAL MEDICINE

## 2022-11-21 PROCEDURE — 93010 ELECTROCARDIOGRAM REPORT: CPT | Mod: 59,,, | Performed by: INTERNAL MEDICINE

## 2022-11-21 PROCEDURE — 36620 INSERTION CATHETER ARTERY: CPT | Mod: 59,,, | Performed by: ANESTHESIOLOGY

## 2022-11-21 PROCEDURE — 63600175 PHARM REV CODE 636 W HCPCS: Performed by: INTERNAL MEDICINE

## 2022-11-21 PROCEDURE — D9220A PRA ANESTHESIA: Mod: CRNA,,, | Performed by: NURSE ANESTHETIST, CERTIFIED REGISTERED

## 2022-11-21 PROCEDURE — 27201423 OPTIME MED/SURG SUP & DEVICES STERILE SUPPLY: Performed by: INTERNAL MEDICINE

## 2022-11-21 PROCEDURE — C2630 CATH, EP, COOL-TIP: HCPCS | Performed by: INTERNAL MEDICINE

## 2022-11-21 PROCEDURE — 93010 EKG 12-LEAD: ICD-10-PCS | Mod: 59,,, | Performed by: INTERNAL MEDICINE

## 2022-11-21 PROCEDURE — 25000003 PHARM REV CODE 250: Performed by: NURSE PRACTITIONER

## 2022-11-21 PROCEDURE — 93656 PR ELECTROPHYS EVAL, COMPREHEN, W/ABLATION OF ATRIAL FIBR: ICD-10-PCS | Mod: ,,, | Performed by: INTERNAL MEDICINE

## 2022-11-21 PROCEDURE — 25000003 PHARM REV CODE 250: Performed by: INTERNAL MEDICINE

## 2022-11-21 PROCEDURE — 25000003 PHARM REV CODE 250: Performed by: NURSE ANESTHETIST, CERTIFIED REGISTERED

## 2022-11-21 PROCEDURE — 37000008 HC ANESTHESIA 1ST 15 MINUTES: Performed by: INTERNAL MEDICINE

## 2022-11-21 PROCEDURE — 93655 PR ABLATE ARRHYTHMIA ADD ON: ICD-10-PCS | Mod: ,,, | Performed by: INTERNAL MEDICINE

## 2022-11-21 PROCEDURE — 93655 ICAR CATH ABLTJ DSCRT ARRHYT: CPT | Mod: ,,, | Performed by: INTERNAL MEDICINE

## 2022-11-21 PROCEDURE — 93005 ELECTROCARDIOGRAM TRACING: CPT

## 2022-11-21 PROCEDURE — 27201037 HC PRESSURE MONITORING SET UP

## 2022-11-21 PROCEDURE — C1730 CATH, EP, 19 OR FEW ELECT: HCPCS | Performed by: INTERNAL MEDICINE

## 2022-11-21 PROCEDURE — C1887 CATHETER, GUIDING: HCPCS | Performed by: INTERNAL MEDICINE

## 2022-11-21 PROCEDURE — 80048 BASIC METABOLIC PNL TOTAL CA: CPT | Performed by: INTERNAL MEDICINE

## 2022-11-21 PROCEDURE — 36620 PR INSERT CATH,ART,PERCUT,SHORTTERM: ICD-10-PCS | Mod: 59,,, | Performed by: ANESTHESIOLOGY

## 2022-11-21 PROCEDURE — C1894 INTRO/SHEATH, NON-LASER: HCPCS | Performed by: INTERNAL MEDICINE

## 2022-11-21 RX ORDER — DEXAMETHASONE SODIUM PHOSPHATE 4 MG/ML
INJECTION, SOLUTION INTRA-ARTICULAR; INTRALESIONAL; INTRAMUSCULAR; INTRAVENOUS; SOFT TISSUE
Status: DISCONTINUED | OUTPATIENT
Start: 2022-11-21 | End: 2022-11-21

## 2022-11-21 RX ORDER — SUCCINYLCHOLINE CHLORIDE 20 MG/ML
INJECTION INTRAMUSCULAR; INTRAVENOUS
Status: DISCONTINUED | OUTPATIENT
Start: 2022-11-21 | End: 2022-11-21

## 2022-11-21 RX ORDER — LIDOCAINE HYDROCHLORIDE 10 MG/ML
INJECTION INFILTRATION; PERINEURAL
Status: DISCONTINUED | OUTPATIENT
Start: 2022-11-21 | End: 2022-11-21 | Stop reason: HOSPADM

## 2022-11-21 RX ORDER — ROCURONIUM BROMIDE 10 MG/ML
INJECTION, SOLUTION INTRAVENOUS
Status: DISCONTINUED | OUTPATIENT
Start: 2022-11-21 | End: 2022-11-21

## 2022-11-21 RX ORDER — DEXMEDETOMIDINE HYDROCHLORIDE 100 UG/ML
INJECTION, SOLUTION INTRAVENOUS
Status: DISCONTINUED | OUTPATIENT
Start: 2022-11-21 | End: 2022-11-21

## 2022-11-21 RX ORDER — FENTANYL CITRATE 50 UG/ML
INJECTION, SOLUTION INTRAMUSCULAR; INTRAVENOUS
Status: DISCONTINUED | OUTPATIENT
Start: 2022-11-21 | End: 2022-11-21

## 2022-11-21 RX ORDER — PHENYLEPHRINE HYDROCHLORIDE 10 MG/ML
INJECTION INTRAVENOUS CONTINUOUS PRN
Status: DISCONTINUED | OUTPATIENT
Start: 2022-11-21 | End: 2022-11-21

## 2022-11-21 RX ORDER — PANTOPRAZOLE SODIUM 40 MG/1
40 TABLET, DELAYED RELEASE ORAL DAILY
Qty: 30 TABLET | Refills: 0 | Status: SHIPPED | OUTPATIENT
Start: 2022-11-21

## 2022-11-21 RX ORDER — FUROSEMIDE 10 MG/ML
INJECTION INTRAMUSCULAR; INTRAVENOUS
Status: DISCONTINUED | OUTPATIENT
Start: 2022-11-21 | End: 2022-11-21

## 2022-11-21 RX ORDER — MIDAZOLAM HYDROCHLORIDE 1 MG/ML
INJECTION, SOLUTION INTRAMUSCULAR; INTRAVENOUS
Status: DISCONTINUED | OUTPATIENT
Start: 2022-11-21 | End: 2022-11-21

## 2022-11-21 RX ORDER — SILVER SULFADIAZINE 10 G/1000G
CREAM TOPICAL
Status: DISCONTINUED | OUTPATIENT
Start: 2022-11-21 | End: 2022-11-21 | Stop reason: HOSPADM

## 2022-11-21 RX ORDER — HEPARIN SODIUM 1000 [USP'U]/ML
INJECTION, SOLUTION INTRAVENOUS; SUBCUTANEOUS
Status: DISCONTINUED | OUTPATIENT
Start: 2022-11-21 | End: 2022-11-21

## 2022-11-21 RX ORDER — CEFAZOLIN SODIUM 1 G/3ML
INJECTION, POWDER, FOR SOLUTION INTRAMUSCULAR; INTRAVENOUS
Status: DISCONTINUED | OUTPATIENT
Start: 2022-11-21 | End: 2022-11-21

## 2022-11-21 RX ORDER — ONDANSETRON 2 MG/ML
INJECTION INTRAMUSCULAR; INTRAVENOUS
Status: DISCONTINUED | OUTPATIENT
Start: 2022-11-21 | End: 2022-11-21

## 2022-11-21 RX ORDER — HEPARIN SOD,PORCINE/0.9 % NACL 1000/500ML
INTRAVENOUS SOLUTION INTRAVENOUS
Status: DISCONTINUED | OUTPATIENT
Start: 2022-11-21 | End: 2022-11-21 | Stop reason: HOSPADM

## 2022-11-21 RX ORDER — HEPARIN SODIUM 10000 [USP'U]/100ML
INJECTION, SOLUTION INTRAVENOUS CONTINUOUS PRN
Status: DISCONTINUED | OUTPATIENT
Start: 2022-11-21 | End: 2022-11-21

## 2022-11-21 RX ORDER — PROTAMINE SULFATE 10 MG/ML
INJECTION, SOLUTION INTRAVENOUS
Status: DISCONTINUED | OUTPATIENT
Start: 2022-11-21 | End: 2022-11-21

## 2022-11-21 RX ORDER — ACETAMINOPHEN 325 MG/1
650 TABLET ORAL EVERY 4 HOURS PRN
Status: DISCONTINUED | OUTPATIENT
Start: 2022-11-21 | End: 2022-11-21 | Stop reason: HOSPADM

## 2022-11-21 RX ORDER — SODIUM CHLORIDE 0.9 % (FLUSH) 0.9 %
3 SYRINGE (ML) INJECTION
Status: DISCONTINUED | OUTPATIENT
Start: 2022-11-21 | End: 2022-11-21 | Stop reason: HOSPADM

## 2022-11-21 RX ORDER — EPHEDRINE SULFATE 50 MG/ML
INJECTION, SOLUTION INTRAVENOUS
Status: DISCONTINUED | OUTPATIENT
Start: 2022-11-21 | End: 2022-11-21

## 2022-11-21 RX ORDER — LIDOCAINE HYDROCHLORIDE 20 MG/ML
INJECTION, SOLUTION EPIDURAL; INFILTRATION; INTRACAUDAL; PERINEURAL
Status: DISCONTINUED | OUTPATIENT
Start: 2022-11-21 | End: 2022-11-21

## 2022-11-21 RX ORDER — DEXMEDETOMIDINE HYDROCHLORIDE 4 UG/ML
INJECTION, SOLUTION INTRAVENOUS CONTINUOUS PRN
Status: DISCONTINUED | OUTPATIENT
Start: 2022-11-21 | End: 2022-11-21

## 2022-11-21 RX ORDER — PROPOFOL 10 MG/ML
VIAL (ML) INTRAVENOUS
Status: DISCONTINUED | OUTPATIENT
Start: 2022-11-21 | End: 2022-11-21

## 2022-11-21 RX ADMIN — ROCURONIUM BROMIDE 20 MG: 10 INJECTION, SOLUTION INTRAVENOUS at 09:11

## 2022-11-21 RX ADMIN — EPHEDRINE SULFATE 5 MG: 50 INJECTION INTRAVENOUS at 08:11

## 2022-11-21 RX ADMIN — SODIUM CHLORIDE: 9 INJECTION, SOLUTION INTRAVENOUS at 07:11

## 2022-11-21 RX ADMIN — HEPARIN SODIUM 12 UNITS/KG/HR: 10000 INJECTION, SOLUTION INTRAVENOUS at 08:11

## 2022-11-21 RX ADMIN — ROCURONIUM BROMIDE 20 MG: 10 INJECTION, SOLUTION INTRAVENOUS at 07:11

## 2022-11-21 RX ADMIN — HEPARIN SODIUM 4000 UNITS: 1000 INJECTION, SOLUTION INTRAVENOUS; SUBCUTANEOUS at 08:11

## 2022-11-21 RX ADMIN — PROPOFOL 150 MG: 10 INJECTION, EMULSION INTRAVENOUS at 07:11

## 2022-11-21 RX ADMIN — SUCCINYLCHOLINE CHLORIDE 160 MG: 20 INJECTION, SOLUTION INTRAMUSCULAR; INTRAVENOUS; PARENTERAL at 07:11

## 2022-11-21 RX ADMIN — HEPARIN SODIUM 4000 UNITS: 1000 INJECTION, SOLUTION INTRAVENOUS; SUBCUTANEOUS at 09:11

## 2022-11-21 RX ADMIN — HEPARIN SODIUM 18000 UNITS: 1000 INJECTION, SOLUTION INTRAVENOUS; SUBCUTANEOUS at 08:11

## 2022-11-21 RX ADMIN — ONDANSETRON 4 MG: 2 INJECTION INTRAMUSCULAR; INTRAVENOUS at 07:11

## 2022-11-21 RX ADMIN — PHENYLEPHRINE HYDROCHLORIDE 0.2 MCG/KG/MIN: 10 INJECTION INTRAVENOUS at 08:11

## 2022-11-21 RX ADMIN — DEXMEDETOMIDINE HYDROCHLORIDE 8 MCG: 100 INJECTION, SOLUTION INTRAVENOUS at 10:11

## 2022-11-21 RX ADMIN — SUGAMMADEX 200 MG: 100 INJECTION, SOLUTION INTRAVENOUS at 10:11

## 2022-11-21 RX ADMIN — LIDOCAINE HYDROCHLORIDE 80 MG: 20 INJECTION, SOLUTION EPIDURAL; INFILTRATION; INTRACAUDAL; PERINEURAL at 07:11

## 2022-11-21 RX ADMIN — DEXAMETHASONE SODIUM PHOSPHATE 4 MG: 4 INJECTION INTRA-ARTICULAR; INTRALESIONAL; INTRAMUSCULAR; INTRAVENOUS; SOFT TISSUE at 07:11

## 2022-11-21 RX ADMIN — ROCURONIUM BROMIDE 5 MG: 10 INJECTION, SOLUTION INTRAVENOUS at 07:11

## 2022-11-21 RX ADMIN — DEXMEDETOMIDINE HYDROCHLORIDE 8 MCG: 100 INJECTION, SOLUTION INTRAVENOUS at 11:11

## 2022-11-21 RX ADMIN — ACETAMINOPHEN 650 MG: 325 TABLET ORAL at 02:11

## 2022-11-21 RX ADMIN — MIDAZOLAM 2 MG: 1 INJECTION INTRAMUSCULAR; INTRAVENOUS at 07:11

## 2022-11-21 RX ADMIN — ROCURONIUM BROMIDE 25 MG: 10 INJECTION, SOLUTION INTRAVENOUS at 08:11

## 2022-11-21 RX ADMIN — FUROSEMIDE 40 MG: 10 INJECTION, SOLUTION INTRAMUSCULAR; INTRAVENOUS at 11:11

## 2022-11-21 RX ADMIN — FENTANYL CITRATE 100 MCG: 50 INJECTION INTRAMUSCULAR; INTRAVENOUS at 07:11

## 2022-11-21 RX ADMIN — SODIUM CHLORIDE, SODIUM GLUCONATE, SODIUM ACETATE, POTASSIUM CHLORIDE, MAGNESIUM CHLORIDE, SODIUM PHOSPHATE, DIBASIC, AND POTASSIUM PHOSPHATE: .53; .5; .37; .037; .03; .012; .00082 INJECTION, SOLUTION INTRAVENOUS at 07:11

## 2022-11-21 RX ADMIN — PROTAMINE SULFATE 90 MG: 10 INJECTION, SOLUTION INTRAVENOUS at 10:11

## 2022-11-21 RX ADMIN — CEFAZOLIN SODIUM 2 G: 1 POWDER, FOR SOLUTION INTRAMUSCULAR; INTRAVENOUS at 08:11

## 2022-11-21 NOTE — DISCHARGE INSTRUCTIONS
Medications:  -Continue all other home medications.    New Medications:  -Start Pantoprazole 40 mg daily x 30 days.  -Start Eliquis 5 mg two times daily x 2 months.   We discussed bleeding risks, and need to come to the ER for any head trauma for CT scanning even if asymptomatic.    Restrictions:  1. Do not strain or lift anything greater than 10 lb for 1 week.   2. Do not drive or operate any dangerous machinery for 24 hours, but optimally 48-72 hours since you were given general anesthesia.   3. Keep the dressing on, clean, and dry for 24 hours.   4. After 24 hours, the dressing may be removed and a shower is allowed.   5. Clean punctures sites with mild soap and water. Allow to air dry. No need to reapply a bandage. Avoid lotions or ointments on these sites.  6. Once the skin has healed (after 1 week), bathing in a tub, swimming, or hot tub is allowed.   7. Inspect the groin site daily and report to your physician any signs of infection at the site: redness, pain, fever >100.4, unusual pain at the access site or affected extremity, unusual swelling at the access site, or any yellow, white or green drainage.    Call 911 if you have:   Bleeding from the puncture site that you cannot stop by doing the followin. Relax and lie down right away.   2. Keep your leg flat and apply firm pressure to the site using your fingers and a gauze pad.   3. Keep the pressure on for 10 minutes. Continue this until the bleeding stops. This may take awhile. When bleeding stops, cover the site with a sterile bandage and keep your leg still as much as possible.    Go to the Emergency Department if you develop:   -Severe bleeding   -Acute Weakness or numbness   -Visual, gait or speech disturbances   -New chest pain, palpitations, shortness of breath, fainting     Follow up:  -Follow up with Dr. Low in 6-8 weeks.

## 2022-11-21 NOTE — ANESTHESIA PROCEDURE NOTES
Arterial    Diagnosis: afib    Patient location during procedure: done in OR    Staffing  Authorizing Provider: William Weaver MD  Performing Provider: William Weaver MD    Anesthesiologist was present at the time of the procedure.    Preanesthetic Checklist  Completed: patient identified, IV checked, site marked, risks and benefits discussed, surgical consent, monitors and equipment checked, pre-op evaluation, timeout performed and anesthesia consent givenArterial  Skin Prep: chlorhexidine gluconate  Local Infiltration: none  Orientation: right  Location: radial    Catheter Size: 20 G  Catheter placement by Anatomical landmarks. Heme positive aspiration all ports. Insertion Attempts: 2  Assessment  Dressing: secured with tape and tegaderm  Patient: Tolerated well

## 2022-11-21 NOTE — H&P
[facility] - Short Stay Cardiac Unit  Cardiac Electrophysiology  History and Physical     Admission Date: 11/21/2022  Code Status: Prior   Attending Provider: Omero Low MD   Principal Problem:Paroxysmal atrial fibrillation    Subjective:     Chief Complaint:  Atrial fibrillation     HPI: Ms. Bass is a 62 year old female with a PMHx of chronic diastolic HF (EF 60%), pAF (on Eliquis), HTN, HLD, obesity s/p gastric sleeve here for arrhythmia evaluation. She developed AF prior to 4/22 (last ECG was 2014, NSR). She had restrictive CM, MR and TR leading to MVR/TVR/Maze/LUCIA resection 8/10/22. Since her surgery she has had return of coarse AF and atypical AFL. She feels rapid palpitations at times. Overall she has recovered well since her surgery. EF 58% post-op. She was taken off of her eliquis post procedure.     Patient history obtainedthrough patient and clinic note with Dr. Low 9/13/22   Echo 8/22:  ·           The left ventricle is normal in size with concentric remodeling and normal systolic function.  ·           The estimated ejection fraction is 58%.  ·           There is abnormal septal wall motion.  ·           Severe left atrial enlargement.  ·           Mild right ventricular enlargement with low normal right ventricular systolic function.  ·           The mitral valve is s/p repair.  ·           There is moderate to severe mitral stenosis.  ·           The mean diastolic gradient across the mitral valve is 10 mmHg at a heart rate of 69 bpm; MVA 1.26.  ·           Normal central venous pressure (3 mmHg).  ·           Trivial posterolateral pericardial effusion.     Ms. Bass presents today to [facility] for scheduled PVI RFA with Dr. Low. She denies any chest pain, palpitations, SOB, SUÁREZ, dizziness, light headedness, weakness, syncope, or near syncopal episodes. She does note rapid palpitations when she is out of rhythm. She denies any bleeding, infections, fevers, rashes, or surgeries in the past  30 days. She is currently not on OAC, only asa 81 nmg daily. CASTILLO deferred per Dr. Low, as patient is in sinus rhythm. She denies hx of CVA/TIA or hx of blood clots. Potassium level 3.1 on 11/14/22, repeating lab this AM. Awaiting result of BMP this AM prior to procedure, Dr. Low aware.     ECG today shows normal sinus rhythm with PACs at 60 bpm.     Past Medical History:   Diagnosis Date    Anticoagulant long-term use     Depression     GERD (gastroesophageal reflux disease)     Hypertension     Medical history non-contributory     Paroxysmal A-fib        Past Surgical History:   Procedure Laterality Date    CORONARY ANGIOGRAPHY N/A 4/13/2022    Procedure: ANGIOGRAM, CORONARY ARTERY;  Surgeon: Isauro Perdomo MD;  Location: [facility] CATH LAB;  Service: Cardiology;  Laterality: N/A;    KIRKPATRICK MAZE PROCEDURE N/A 8/10/2022    Procedure: KIRKPATRICK MAZE PROCEDURE;  Surgeon: Manish Morris MD;  Location: [facility] OR [address] [place] [address];  Service: Cardiothoracic;  Laterality: N/A;  Maze    FINGER SURGERY Left     index fracture repair    GALLBLADDER SURGERY  08/2021    KNEE ARTHROSCOPY      SHOULDER ARTHROSCOPY      SHOULDER SURGERY      TRICUSPID VALVULOPLASTY N/A 8/10/2022    Procedure: REPAIR, TRICUSPID VALVE;  Surgeon: Manish Morris MD;  Location: [facility] OR [address] [place] [address];  Service: Cardiothoracic;  Laterality: N/A;       Review of patient's allergies indicates:   Allergen Reactions    Morphine Itching       Current Facility-Administered Medications on File Prior to Encounter   Medication    0.9%  NaCl infusion     Current Outpatient Medications on File Prior to Encounter   Medication Sig    amLODIPine (NORVASC) 2.5 MG tablet Take 1 tablet (2.5 mg total) by mouth once daily.    aspirin 325 MG tablet Take 1 tablet (325 mg total) by mouth once daily. (Patient taking differently: Take 325 mg by mouth once daily. Pt states she currently takes 81 mg)    diclofenac sodium (VOLTAREN) 1 % Gel Apply topically.    docusate sodium (COLACE) 100 MG  capsule Take 1 capsule (100 mg total) by mouth 2 (two) times daily as needed for Constipation.    ergocalciferol (ERGOCALCIFEROL) 50,000 unit Cap Take 50,000 Units by mouth every 7 days.    fluticasone propionate (FLONASE) 50 mcg/actuation nasal spray 1 spray by Each Nostril route once daily.    furosemide (LASIX) 20 MG tablet Take 20 mg by mouth daily as needed.    hydroCHLOROthiazide (HYDRODIURIL) 25 MG tablet Take 25 mg by mouth once daily.    acetaminophen (TYLENOL) 500 MG tablet Take 2 tablets (1,000 mg total) by mouth every 6 (six) hours as needed for Pain.    ibuprofen (ADVIL,MOTRIN) 600 MG tablet Take 600 mg by mouth every 6 (six) hours as needed.    nitrofurantoin, macrocrystal-monohydrate, (MACROBID) 100 MG capsule Take 100 mg by mouth every 12 (twelve) hours.    omega-3 acid ethyl esters (LOVAZA) 1 gram capsule Take 2 g by mouth 2 (two) times daily.    [DISCONTINUED] benazepril (LOTENSIN) 40 MG tablet Take 40 mg by mouth every evening.     [DISCONTINUED] metoprolol succinate (TOPROL-XL) 25 MG 24 hr tablet Take 25 mg by mouth once daily.    [DISCONTINUED] omeprazole (PRILOSEC) 40 MG capsule Take 1 capsule (40 mg total) by mouth every morning.     Family History       Problem Relation (Age of Onset)    Stroke Mother          Tobacco Use    Smoking status: Former     Types: Cigarettes    Smokeless tobacco: Never   Substance and Sexual Activity    Alcohol use: No    Drug use: No    Sexual activity: Not Currently     Review of Systems   Constitutional: Negative for chills, fever and malaise/fatigue.   HENT:  Negative for congestion and nosebleeds.    Eyes:  Negative for blurred vision.   Cardiovascular:  Negative for chest pain, dyspnea on exertion, irregular heartbeat, leg swelling, near-syncope, orthopnea, paroxysmal nocturnal dyspnea and syncope. Positive for rapid palpitations.  Respiratory:  Negative for cough, hemoptysis, shortness of breath, sleep disturbances due to breathing, sputum production and  wheezing.    Endocrine: Negative for polyphagia.   Hematologic/Lymphatic: Negative for bleeding problem. Does not bruise/bleed easily.   Skin:  Negative for itching and rash.   Musculoskeletal:  Negative for back pain, joint swelling, muscle cramps and muscle weakness.   Gastrointestinal:  Negative for bloating, abdominal pain, hematemesis, hematochezia, nausea and vomiting.   Genitourinary:  Negative for dysuria and hematuria.   Neurological:  Negative for dizziness, focal weakness, headaches, light-headedness, loss of balance, numbness and weakness.   Psychiatric/Behavioral:  Negative for altered mental status.    Objective:     Vital Signs (Most Recent):  Temp: 98.4 °F (36.9 °C) (11/21/22 0601)  Resp: 20 (11/21/22 0601)  BP: (!) 132/59 (11/21/22 0603)   Vital Signs (24h Range):  Temp:  [98.4 °F (36.9 °C)] 98.4 °F (36.9 °C)  Resp:  [20] 20  BP: (128-132)/(59-62) 132/59     Weight: 121.6 kg (268 lb)  Body mass index is 44.6 kg/m².    Physical Exam  Vitals and nursing note reviewed.   Constitutional:       General: She is not in acute distress.     Appearance: Normal appearance. She is obese. She is not diaphoretic.   HENT:      Head: Normocephalic and atraumatic.      Mouth/Throat:      Mouth: Mucous membranes are moist.      Pharynx: No oropharyngeal exudate.   Eyes:      General:         Right eye: No discharge.         Left eye: No discharge.      Conjunctiva/sclera: Conjunctivae normal.      Pupils: Pupils are equal, round, and reactive to light.   Cardiovascular:      Rate and Rhythm: Normal rate and regular rhythm.      Pulses: Normal pulses and intact distal pulses.           Radial pulses are 2+ on the right side and 2+ on the left side.        Dorsalis pedis pulses are 2+ on the right side and 2+ on the left side.      Heart sounds: Normal heart sounds, S1 normal and S2 normal.  Pulmonary:      Effort: Pulmonary effort is normal. No accessory muscle usage or respiratory distress.      Breath sounds: Normal  breath sounds. No decreased breath sounds, wheezing, rhonchi or rales.   Chest:      Chest wall: No tenderness.   Abdominal:      General: Bowel sounds are normal. There is no distension.      Palpations: Abdomen is soft.      Tenderness: There is no abdominal tenderness. There is no guarding.   Musculoskeletal:         General: Normal range of motion.      Cervical back: Normal range of motion and neck supple.   Skin:     General: Skin is warm and dry.      Findings: No erythema or rash.   Neurological:      Mental Status: She is alert and oriented to person, place, and time. She is not disoriented.      Cranial Nerves: No cranial nerve deficit.      Sensory: No sensory deficit.      Motor: No abnormal muscle tone.      Coordination: Coordination normal.      Gait: Gait normal.   Psychiatric:         Mood and Affect: Mood normal.         Behavior: Behavior normal.         Thought Content: Thought content normal.         Judgment: Judgment normal.     Significant Labs: Labs from 11/14/22 through today reviewed. Potassium level 3.1, repeating lab this AM. Awaiting result of BMP this AM prior to procedure, Dr. Low aware.     Significant Imaging:  ECG today shows sinus rhythm at 60 bpm.    Assessment and Plan:   Plan:  -PVI RFA   -Anesthesia for sedation    Dr. Low at bedside discussing the nature of PVI including transseptal puncture with patient and daughter. We discussed risks and benefits at length. Our discussion included, but was not limited to the risk of death, infection, bleeding, stroke, MI, cardiac perforation, embolism, cardiac tamponade, vascular injury, AE fistula, injury to phrenic nerve, pulmonary vein stenosis and other organic injury including the possibility for need for surgery or pacemaker implantation. We discussed success rates and possible need for redo procedures. Patient verbalized understanding. All questions answered, no further questions or concerns, patient would like to proceed.  Consents signed.     Belen Richter NP  Cardiac Electrophysiology  [facility] - Arrhythmia    Attending: Omero Low MD

## 2022-11-21 NOTE — PLAN OF CARE
Reviewed discharge paperwork with patient and family. Verbalized understanding and given copy of discharge paperwork along with extra gauze and tegaderm for home if needed. Sitting up on side of bed eating and waiting on transportation.

## 2022-11-21 NOTE — PROGRESS NOTES
Removed andre groin sutures. No active signs of bleeding. Placed gauze and tegaderm over sites. Tolerated procedure well.

## 2022-11-21 NOTE — Clinical Note
The groin was prepped. The site was prepped with ChloraPrep. The site was clipped. The patient was draped. The patient was positioned supine. The patient was secured using safety straps, with ulnar pads, with a wedge under the patient and to an armboard.

## 2022-11-21 NOTE — SUBJECTIVE & OBJECTIVE
Past Medical History:   Diagnosis Date    Anticoagulant long-term use     Depression     GERD (gastroesophageal reflux disease)     Hypertension     Medical history non-contributory     Paroxysmal A-fib        Past Surgical History:   Procedure Laterality Date    CORONARY ANGIOGRAPHY N/A 4/13/2022    Procedure: ANGIOGRAM, CORONARY ARTERY;  Surgeon: Isauro Perdomo MD;  Location: [facility] CATH LAB;  Service: Cardiology;  Laterality: N/A;    KIRKPATRICK MAZE PROCEDURE N/A 8/10/2022    Procedure: KIRKPATRICK MAZE PROCEDURE;  Surgeon: Manish Morris MD;  Location: [facility] OR [address] [place] [address];  Service: Cardiothoracic;  Laterality: N/A;  Maze    FINGER SURGERY Left     index fracture repair    GALLBLADDER SURGERY  08/2021    KNEE ARTHROSCOPY      SHOULDER ARTHROSCOPY      SHOULDER SURGERY      TRICUSPID VALVULOPLASTY N/A 8/10/2022    Procedure: REPAIR, TRICUSPID VALVE;  Surgeon: Manish Morris MD;  Location: [address];  Service: Cardiothoracic;  Laterality: N/A;       Review of patient's allergies indicates:   Allergen Reactions    Morphine Itching       Current Facility-Administered Medications on File Prior to Encounter   Medication    0.9%  NaCl infusion     Current Outpatient Medications on File Prior to Encounter   Medication Sig    amLODIPine (NORVASC) 2.5 MG tablet Take 1 tablet (2.5 mg total) by mouth once daily.    aspirin 325 MG tablet Take 1 tablet (325 mg total) by mouth once daily. (Patient taking differently: Take 325 mg by mouth once daily. Pt states she currently takes 81 mg)    diclofenac sodium (VOLTAREN) 1 % Gel Apply topically.    docusate sodium (COLACE) 100 MG capsule Take 1 capsule (100 mg total) by mouth 2 (two) times daily as needed for Constipation.    ergocalciferol (ERGOCALCIFEROL) 50,000 unit Cap Take 50,000 Units by mouth every 7 days.    fluticasone propionate (FLONASE) 50 mcg/actuation nasal spray 1 spray by Each Nostril route once daily.    furosemide (LASIX) 20 MG tablet Take 20 mg by mouth daily as  needed.    hydroCHLOROthiazide (HYDRODIURIL) 25 MG tablet Take 25 mg by mouth once daily.    acetaminophen (TYLENOL) 500 MG tablet Take 2 tablets (1,000 mg total) by mouth every 6 (six) hours as needed for Pain.    ibuprofen (ADVIL,MOTRIN) 600 MG tablet Take 600 mg by mouth every 6 (six) hours as needed.    nitrofurantoin, macrocrystal-monohydrate, (MACROBID) 100 MG capsule Take 100 mg by mouth every 12 (twelve) hours.    omega-3 acid ethyl esters (LOVAZA) 1 gram capsule Take 2 g by mouth 2 (two) times daily.    [DISCONTINUED] benazepril (LOTENSIN) 40 MG tablet Take 40 mg by mouth every evening.     [DISCONTINUED] metoprolol succinate (TOPROL-XL) 25 MG 24 hr tablet Take 25 mg by mouth once daily.    [DISCONTINUED] omeprazole (PRILOSEC) 40 MG capsule Take 1 capsule (40 mg total) by mouth every morning.     Family History       Problem Relation (Age of Onset)    Stroke Mother          Tobacco Use    Smoking status: Former     Types: Cigarettes    Smokeless tobacco: Never   Substance and Sexual Activity    Alcohol use: No    Drug use: No    Sexual activity: Not Currently     Review of Systems   Constitutional: Negative for chills, fever and malaise/fatigue.   HENT:  Negative for congestion and nosebleeds.    Eyes:  Negative for blurred vision.   Cardiovascular:  Negative for chest pain, dyspnea on exertion, irregular heartbeat, leg swelling, near-syncope, orthopnea, palpitations, paroxysmal nocturnal dyspnea and syncope.   Respiratory:  Negative for cough, hemoptysis, shortness of breath, sleep disturbances due to breathing, sputum production and wheezing.    Endocrine: Negative for polyphagia.   Hematologic/Lymphatic: Negative for bleeding problem. Does not bruise/bleed easily.   Skin:  Negative for itching and rash.   Musculoskeletal:  Negative for back pain, joint swelling, muscle cramps and muscle weakness.   Gastrointestinal:  Negative for bloating, abdominal pain, hematemesis, hematochezia, nausea and vomiting.    Genitourinary:  Negative for dysuria and hematuria.   Neurological:  Negative for dizziness, focal weakness, headaches, light-headedness, loss of balance, numbness and weakness.   Psychiatric/Behavioral:  Negative for altered mental status.    Objective:     Vital Signs (Most Recent):  Temp: 98.4 °F (36.9 °C) (11/21/22 0601)  Resp: 20 (11/21/22 0601)  BP: (!) 132/59 (11/21/22 0603)   Vital Signs (24h Range):  Temp:  [98.4 °F (36.9 °C)] 98.4 °F (36.9 °C)  Resp:  [20] 20  BP: (128-132)/(59-62) 132/59       Weight: 121.6 kg (268 lb)  Body mass index is 44.6 kg/m².            Physical Exam  Vitals and nursing note reviewed.   Constitutional:       General: She is not in acute distress.     Appearance: Normal appearance. She is well-developed. She is not diaphoretic.   HENT:      Head: Normocephalic and atraumatic.      Mouth/Throat:      Mouth: Mucous membranes are moist.      Pharynx: No oropharyngeal exudate.   Eyes:      General:         Right eye: No discharge.         Left eye: No discharge.      Conjunctiva/sclera: Conjunctivae normal.      Pupils: Pupils are equal, round, and reactive to light.   Cardiovascular:      Rate and Rhythm: Normal rate and regular rhythm. No extrasystoles are present.     Chest Wall: PMI is not displaced.      Pulses: Normal pulses and intact distal pulses.           Radial pulses are 2+ on the right side and 2+ on the left side.        Dorsalis pedis pulses are 2+ on the right side and 2+ on the left side.      Heart sounds: Normal heart sounds, S1 normal and S2 normal. No murmur heard.    No friction rub. No gallop.   Pulmonary:      Effort: Pulmonary effort is normal. No accessory muscle usage or respiratory distress.      Breath sounds: Normal breath sounds. No decreased breath sounds, wheezing, rhonchi or rales.   Chest:      Chest wall: No tenderness.   Abdominal:      General: Bowel sounds are normal. There is no distension.      Palpations: Abdomen is soft.      Tenderness:  There is no abdominal tenderness. There is no guarding.   Musculoskeletal:         General: Normal range of motion.      Cervical back: Normal range of motion and neck supple.   Skin:     General: Skin is warm and dry.      Findings: No erythema or rash.   Neurological:      Mental Status: She is alert and oriented to person, place, and time. She is not disoriented.      Cranial Nerves: No cranial nerve deficit.      Sensory: No sensory deficit.      Motor: No abnormal muscle tone.      Coordination: Coordination normal.      Gait: Gait normal.   Psychiatric:         Mood and Affect: Mood normal.         Behavior: Behavior normal.         Thought Content: Thought content normal.         Judgment: Judgment normal.       Significant Labs: Labs from 11/14/22 through today reviewed.    Significant Imaging:  ECG today shows sinus rhythm at 60 bpm.

## 2022-11-21 NOTE — ANESTHESIA PROCEDURE NOTES
Intubation    Date/Time: 11/21/2022 7:45 AM  Performed by: Ama Haywood CRNA  Authorized by: William Weaver MD     Intubation:     Induction:  Intravenous    Intubated:  Postinduction    Mask Ventilation:  Easy mask    Attempts:  1    Attempted By:  CRNA    Method of Intubation:  Direct    Blade:  [place]wood 2    Laryngeal View Grade: Grade I - full view of cords      Difficult Airway Encountered?: No      Complications:  None    Airway Device:  Oral endotracheal tube    Airway Device Size:  7.0    Style/Cuff Inflation:  Cuffed (inflated to minimal occlusive pressure)    Inflation Amount (mL):  5    Tube secured:  21    Secured at:  The lips    Placement Verified By:  Capnometry    Complicating Factors:  None    Findings Post-Intubation:  BS equal bilateral and atraumatic/condition of teeth unchanged

## 2022-11-21 NOTE — PLAN OF CARE
Patient arrived to room. PIV placed, labs sent. Admit assessment completed. Plan of care discussed with patient. Sibling at bedside. Nurse call bell within reach. Will monitor

## 2022-11-21 NOTE — TRANSFER OF CARE
"Anesthesia Transfer of Care Note    Patient: Magalys Bass    Procedure(s) Performed: Procedure(s) (LRB):  Ablation atrial fibrillation (N/A)  Ablation, Atrial Flutter, Typical  Cardioversion or Defibrillation    Patient location: PACU    Anesthesia Type: general    Transport from OR: Transported from OR on 6-10 L/min O2 by face mask with adequate spontaneous ventilation    Post pain: adequate analgesia    Post assessment: no apparent anesthetic complications    Post vital signs: stable    Level of consciousness: awake    Nausea/Vomiting: no nausea/vomiting    Complications: none    Transfer of care protocol was followed      Last vitals:   Visit Vitals  BP (!) 132/59 (BP Location: Left arm, Patient Position: Lying)   Temp 36.9 °C (98.4 °F) (Temporal)   Resp 20   Ht 5' 5" (1.651 m)   Wt 121.6 kg (268 lb)   LMP 08/01/2014   Breastfeeding No   BMI 44.60 kg/m²     "

## 2022-11-21 NOTE — ANESTHESIA PREPROCEDURE EVALUATION
11/21/2022  Magalys Bass is a 62 y.o., female.      Pre-op Assessment    I have reviewed the Patient Summary Reports.       I have reviewed the Medications.     Review of Systems  Anesthesia Hx:  History of prior surgery of interest to airway management or planning:  Denies Personal Hx of Anesthesia complications.   Cardiovascular:   Hypertension Dysrhythmias atrial fibrillation PVD Severe MS  EF 58% with low-normal RV Fxn  Carotids are 1-39% bilat   Hepatic/GI:   GERD    Psych:   Psychiatric History          Physical Exam  General: Well nourished    Airway:  Mallampati: III / II  Mouth Opening: Normal  TM Distance: Normal  Tongue: Normal  Neck ROM: Normal ROM    Dental:  Dentures    Chest/Lungs:  Normal Respiratory Rate    Heart:  Rate: Normal        Anesthesia Plan  Type of Anesthesia, risks & benefits discussed:    Anesthesia Type: Gen ETT  Intra-op Monitoring Plan: Art Line  Post Op Pain Control Plan: multimodal analgesia  Induction:  IV  Airway Plan: Video  Informed Consent: Informed consent signed with the Patient and all parties understand the risks and agree with anesthesia plan.  All questions answered.   ASA Score: 3  Day of Surgery Review of History & Physical: H&P Update referred to the surgeon/provider.  Anesthesia Plan Notes: NPO confirmed.   Patient noted to have severe MS post repair. Will reassess with CASTILLO and plan phenylephrine infusion as required to maintain systemic BP.      Ready For Surgery From Anesthesia Perspective.     .

## 2022-11-21 NOTE — HPI
Ms. Bass is a 62 year old female with a PMHx of chronic diastolic HF (EF 60%), pAF (on Eliquis), HTN, HLD, obesity s/p gastric sleeve here for arrhythmia evaluation. She developed AF prior to 4/22 (last ECG was 2014, NSR). She had restrictive CM, MR and TR leading to MVR/TVR/Maze/LUCIA resection 8/10/22. Since her surgery she has had return of coarse AF and atypical AFL. She feels rapid palpitations at times. Overall she has recovered well since her surgery. EF 58% post-op. She was taken off of her eliquis post procedure.     Patient history obtainedthrough patient and clinic note with Dr. Low 9/13/22   Echo 8/22:  ·           The left ventricle is normal in size with concentric remodeling and normal systolic function.  ·           The estimated ejection fraction is 58%.  ·           There is abnormal septal wall motion.  ·           Severe left atrial enlargement.  ·           Mild right ventricular enlargement with low normal right ventricular systolic function.  ·           The mitral valve is s/p repair.  ·           There is moderate to severe mitral stenosis.  ·           The mean diastolic gradient across the mitral valve is 10 mmHg at a heart rate of 69 bpm; MVA 1.26.  ·           Normal central venous pressure (3 mmHg).  ·           Trivial posterolateral pericardial effusion.       Ms. Bass presents today to [facility] for scheduled PVI RFA with Dr. Low. She denies any chest pain, palpitations, SOB, SUÁREZ, dizziness, light headedness, weakness, syncope, or near syncopal episodes. She does note rapid palpitations when she is out of rhythm. She denies any bleeding, infections, fevers, rashes, or surgeries in the past 30 days. She is currently not on OAC, only asa 81 ng daily.     ECG today shows normal sinus rhythm with PACs at 60 bpm.

## 2022-11-21 NOTE — PROGRESS NOTES
Completed bedrest. Able to ambulate and void without complication. Reassessment of andre groin dressings show sites are clean, dry, intact, and soft.  Family assisting with getting dressed.

## 2022-11-21 NOTE — ANESTHESIA POSTPROCEDURE EVALUATION
Anesthesia Post Evaluation    Patient: Magalys Bass    Procedure(s) Performed: Procedure(s) (LRB):  Ablation atrial fibrillation (N/A)  Ablation, Atrial Flutter, Typical  Cardioversion or Defibrillation    Final Anesthesia Type: general      Patient location during evaluation: PACU  Patient participation: Yes- Able to Participate  Level of consciousness: awake and alert  Post-procedure vital signs: reviewed and stable  Pain management: adequate  Airway patency: patent    PONV status at discharge: No PONV  Anesthetic complications: no      Cardiovascular status: blood pressure returned to baseline  Respiratory status: unassisted  Hydration status: euvolemic  Follow-up not needed.          Vitals Value Taken Time   /71 11/21/22 1446   Temp 36.6 °C (97.9 °F) 11/21/22 1230   Pulse 67 11/21/22 1451   Resp 10 11/21/22 1451   SpO2 100 % 11/21/22 1451   Vitals shown include unvalidated device data.      No case tracking events are documented in the log.      Pain/Shanique Score: Pain Rating Prior to Med Admin: 3 (11/21/2022  2:15 PM)  Shanique Score: 10 (11/21/2022  1:30 PM)         Patient returned call and is pleased with surgery date and time. Added to providers calendar     Azalea Augustin  Women's Health

## 2022-11-21 NOTE — NURSING TRANSFER
Nursing Transfer Note      11/21/2022     Reason patient is being transferred: to [facility]    Transfer via bed    Transfer with telemetry monitoring    Transported by RN    Medicines sent: N/A    Any special needs or follow-up needed:     Chart send with patient: Yes    Notified: Family    Patient reassessed at: 11/21/22 @ 1300    Upon arrival to floor: Transfer of care to [facility]

## 2022-11-22 NOTE — DISCHARGE SUMMARY
[facility]  Cardiac Electrophysiology  Discharge Summary      Patient Name: Magalys Bass  MRN: [id number]  Admission Date: 11/21/2022  Hospital Length of Stay: 0 days  Discharge Date and Time: 11/21/2022  6:00 PM  Attending Physician: Omero Low MD    Discharging Provider: Belen Richter NP  Primary Care Physician: Yonathan Brewer MD    HPI:  Ms. Bass is a 62 year old female with a PMHx of chronic diastolic HF (EF 60%), pAF (on Eliquis), HTN, HLD, obesity s/p gastric sleeve here for arrhythmia evaluation. She developed AF prior to 4/22 (last ECG was 2014, NSR). She had restrictive CM, MR and TR leading to MVR/TVR/Maze/LUCIA resection 8/10/22. Since her surgery she has had return of coarse AF and atypical AFL. She feels rapid palpitations at times. Overall she has recovered well since her surgery. EF 58% post-op. She was taken off of her eliquis post procedure.      Patient history obtainedthrough patient and clinic note with Dr. Low 9/13/22   Echo 8/22:  ·           The left ventricle is normal in size with concentric remodeling and normal systolic function.  ·           The estimated ejection fraction is 58%.  ·           There is abnormal septal wall motion.  ·           Severe left atrial enlargement.  ·           Mild right ventricular enlargement with low normal right ventricular systolic function.  ·           The mitral valve is s/p repair.  ·           There is moderate to severe mitral stenosis.  ·           The mean diastolic gradient across the mitral valve is 10 mmHg at a heart rate of 69 bpm; MVA 1.26.  ·           Normal central venous pressure (3 mmHg).  ·           Trivial posterolateral pericardial effusion.     Ms. Bass presents today to [facility] for scheduled PVI RFA with Dr. Low. She denies any chest pain, palpitations, SOB, SUÁREZ, dizziness, light headedness, weakness, syncope, or near syncopal episodes. She does note rapid palpitations when she is out of rhythm.  She denies any bleeding, infections, fevers, rashes, or surgeries in the past 30 days. She is currently not on OAC, only asa 81 nmg daily. CASTILLO deferred per Dr. Low, as patient is in sinus rhythm. She denies hx of CVA/TIA or hx of blood clots. Potassium level 3.1 on 11/14/22, repeating lab this AM. Awaiting result of BMP this AM prior to procedure, Dr. Low aware.      ECG today shows normal sinus rhythm with PACs at 60 bpm.     Procedure(s) (LRB):  Ablation atrial fibrillation (N/A)  Ablation, Atrial Flutter, Typical  Cardioversion or Defibrillation     Indwelling Lines/Drains at time of discharge:  Lines/Drains/Airways       None      Hospital Course: Patient underwent CTI, PVI RFA (see procedure note for details), tolerated procedure well with no acute complications. Admitted to PACU, post-procedure ECG showed normal sinus rhythm 60 bpm  ms QRS 90 ms QT//540 ms. Bilateral groin sites with sutures removed, dressings C/D/I. No bleeding, hematoma, or bruit noted. Bedrest completed x 6 hours. She was monitored on telemetry, no acute arrhythmias.   Patient ambulating, tolerating PO intake, and voiding with no issues. Denies any chest pain or SOB. Discharge plans discussed with Dr. Low. Start Pantoprazole 40 mg daily x 30 days. Start Eliquis 5 mg two times daily x 2 months. We discussed bleeding risks, and need to come to the ER for any head trauma for CT scanning even if asymptomatic. Follow up with Dr. Low in 6-8 weeks. Follow up with your PCP regarding potassium level.  Patient to continue all other home medications. Ms. Bass was assessed at bedside prior to discharge. She reported feeling well and denied chest discomfort, shortness of breath, palpitations, lightheadedness, or any other acute symptoms. Discharge plans/instructions discussed with patient and daughter who verbalized understanding and agreement of plans of care. No further questions or concerns voiced at this time. She was  discharged home in stable condition.     Goals of Care Treatment Preferences:  Code Status: Full Code    Consults: None     Significant Diagnostic Studies: Post procedure ECG showed normal sinus rhythm 60 bpm  ms QRS 90 ms QT//540 ms.  Final Active Diagnoses:    Diagnosis Date Noted POA    PRINCIPAL PROBLEM:  Paroxysmal atrial fibrillation [I48.0] 02/11/2022 Yes      Problems Resolved During this Admission:     Discharged Condition: stable    Disposition: Home or Self Care    Follow Up:   Follow-up Information       Omreo Low MD Follow up in 8 week(s).    Specialties: Electrophysiology, Cardiovascular Disease  Why: Post ablation  Contact information:  [facility]  [facility] [id number]  [phone]                           Patient Instructions:      No driving until:   Order Comments: No driving or operating heavy machinery for 24-48 hours after your procedure because you received sedation.     Other restrictions (specify):   Order Comments: Medications:  -Continue all other home medications.    New Medications:  -Start Pantoprazole 40 mg daily x 30 days.  -Start Eliquis 5 mg two times daily x 2 months.   We discussed bleeding risks, and need to come to the ER for any head trauma for CT scanning even if asymptomatic.    Restrictions:  1. Do not strain or lift anything greater than 10 lb for 1 week.   2. Do not drive or operate any dangerous machinery for 24 hours, but optimally 48-72 hours since you were given general anesthesia.   3. Keep the dressing on, clean, and dry for 24 hours.   4. After 24 hours, the dressing may be removed and a shower is allowed.   5. Clean punctures sites with mild soap and water. Allow to air dry. No need to reapply a bandage. Avoid lotions or ointments on these sites.  6. Once the skin has healed (after 1 week), bathing in a tub, swimming, or hot tub is allowed.   7. Inspect the groin site daily and report to your physician any signs of infection at the  site: redness, pain, fever >100.4, unusual pain at the access site or affected extremity, unusual swelling at the access site, or any yellow, white or green drainage.    Call 911 if you have:   Bleeding from the puncture site that you cannot stop by doing the followin. Relax and lie down right away.   2. Keep your leg flat and apply firm pressure to the site using your fingers and a gauze pad.   3. Keep the pressure on for 10 minutes. Continue this until the bleeding stops. This may take awhile. When bleeding stops, cover the site with a sterile bandage and keep your leg still as much as possible.    Go to the Emergency Department if you develop:   -Severe bleeding   -Acute Weakness or numbness   -Visual, gait or speech disturbances   -New chest pain, palpitations, shortness of breath, fainting     Follow up:  -Follow up with Dr. Low in 6-8 weeks.  -Follow up with your PCP regarding your potassium level     Notify your health care provider if you experience any of the following:  increased confusion or weakness     Notify your health care provider if you experience any of the following:  persistent dizziness, light-headedness, or visual disturbances     Notify your health care provider if you experience any of the following:  worsening rash     Notify your health care provider if you experience any of the following:  severe persistent headache     Notify your health care provider if you experience any of the following:  difficulty breathing or increased cough     Notify your health care provider if you experience any of the following:  redness, tenderness, or signs of infection (pain, swelling, redness, odor or green/yellow discharge around incision site)     Notify your health care provider if you experience any of the following:  persistent nausea and vomiting or diarrhea     Notify your health care provider if you experience any of the following:  temperature >100.4     Notify your health care provider  if you experience any of the following:  severe uncontrolled pain     Remove dressing in 24 hours     Lifting restrictions     Shower on day dressing removed (No bath)     Medications:  Reconciled Home Medications:      Medication List        START taking these medications      apixaban 5 mg Tab  Commonly known as: ELIQUIS  Take 1 tablet (5 mg total) by mouth 2 (two) times daily.     pantoprazole 40 MG tablet  Commonly known as: PROTONIX  Take 1 tablet (40 mg total) by mouth once daily.            CONTINUE taking these medications      acetaminophen 500 MG tablet  Commonly known as: TYLENOL  Take 2 tablets (1,000 mg total) by mouth every 6 (six) hours as needed for Pain.     amLODIPine 2.5 MG tablet  Commonly known as: NORVASC  Take 1 tablet (2.5 mg total) by mouth once daily.     diclofenac sodium 1 % Gel  Commonly known as: VOLTAREN  Apply topically.     docusate sodium 100 MG capsule  Commonly known as: COLACE  Take 1 capsule (100 mg total) by mouth 2 (two) times daily as needed for Constipation.     ergocalciferol 50,000 unit Cap  Commonly known as: ERGOCALCIFEROL  Take 50,000 Units by mouth every 7 days.     fluticasone propionate 50 mcg/actuation nasal spray  Commonly known as: FLONASE  1 spray by Each Nostril route once daily.     furosemide 20 MG tablet  Commonly known as: LASIX  Take 20 mg by mouth daily as needed.     hydroCHLOROthiazide 25 MG tablet  Commonly known as: HYDRODIURIL  Take 25 mg by mouth once daily.     ibuprofen 600 MG tablet  Commonly known as: ADVIL,MOTRIN  Take 600 mg by mouth every 6 (six) hours as needed.            ASK your doctor about these medications      aspirin 325 MG tablet  Take 1 tablet (325 mg total) by mouth once daily.     nitrofurantoin (macrocrystal-monohydrate) 100 MG capsule  Commonly known as: MACROBID  Take 100 mg by mouth every 12 (twelve) hours.     omega-3 acid ethyl esters 1 gram capsule  Commonly known as: LOVAZA  Take 2 g by mouth 2 (two) times daily.             Plan:  -Continue home medications.  -Start Eliquis 5 mg BID x 2 months  -Start pantoprazole 40 mg daily x 30 days  -Follow up in clinic in 6-8 weeks.  -Follow up with PCP regarding hypokalemia.    Time spent on the discharge of patient: 14 minutes    Belen Richter NP  Cardiac Electrophysiology  [facility]    Attending: Omero Low MD

## 2022-11-28 ENCOUNTER — TELEPHONE (OUTPATIENT)
Dept: ELECTROPHYSIOLOGY | Facility: CLINIC | Age: 62
End: 2022-11-28
Payer: MEDICAID

## 2022-11-28 NOTE — TELEPHONE ENCOUNTER
Spoke to patient regarding post op care. Patient states she is feeling ok, denies any complaints of chest pain or SOB.   She has not felt any palpitations.   Instructed on blanking period that could take 2-3 months for complete healing.  Wound site dry and intact without redness, swelling, hematoma or drainage. Patient denies any fever or pain. She reports a small knot at one groin site, instructed on warm compresses or heating pad several times a day to help soften so the body can absorb.     Patient  has resumed medications,  she said her pharmacist would not fill her Pantoprazole because she is currently taking Omeprazole.  Patient verbalizes understanding of all post op instructions.

## 2022-12-02 DIAGNOSIS — I48.91 ATRIAL FIBRILLATION, UNSPECIFIED TYPE: Primary | ICD-10-CM

## 2023-01-24 ENCOUNTER — HOSPITAL ENCOUNTER (OUTPATIENT)
Dept: CARDIOLOGY | Facility: CLINIC | Age: 63
Discharge: HOME | End: 2023-01-24
Payer: MEDICAID

## 2023-01-24 ENCOUNTER — OFFICE VISIT (OUTPATIENT)
Dept: ELECTROPHYSIOLOGY | Facility: CLINIC | Age: 63
End: 2023-01-24
Payer: MEDICAID

## 2023-01-24 VITALS
BODY MASS INDEX: 45.98 KG/M2 | HEART RATE: 55 BPM | HEIGHT: 65 IN | WEIGHT: 276 LBS | DIASTOLIC BLOOD PRESSURE: 70 MMHG | SYSTOLIC BLOOD PRESSURE: 122 MMHG

## 2023-01-24 DIAGNOSIS — Z98.890 H/O MAZE PROCEDURE: Primary | ICD-10-CM

## 2023-01-24 DIAGNOSIS — I48.91 ATRIAL FIBRILLATION, UNSPECIFIED TYPE: ICD-10-CM

## 2023-01-24 DIAGNOSIS — I48.0 PAROXYSMAL ATRIAL FIBRILLATION: ICD-10-CM

## 2023-01-24 PROCEDURE — 1159F PR MEDICATION LIST DOCUMENTED IN MEDICAL RECORD: ICD-10-PCS | Mod: CPTII,,, | Performed by: INTERNAL MEDICINE

## 2023-01-24 PROCEDURE — 93010 ELECTROCARDIOGRAM REPORT: CPT | Mod: S$PBB,,, | Performed by: INTERNAL MEDICINE

## 2023-01-24 PROCEDURE — 3008F BODY MASS INDEX DOCD: CPT | Mod: CPTII,,, | Performed by: INTERNAL MEDICINE

## 2023-01-24 PROCEDURE — 99214 PR OFFICE/OUTPT VISIT, EST, LEVL IV, 30-39 MIN: ICD-10-PCS | Mod: S$PBB,,, | Performed by: INTERNAL MEDICINE

## 2023-01-24 PROCEDURE — 3078F DIAST BP <80 MM HG: CPT | Mod: CPTII,,, | Performed by: INTERNAL MEDICINE

## 2023-01-24 PROCEDURE — 3078F PR MOST RECENT DIASTOLIC BLOOD PRESSURE < 80 MM HG: ICD-10-PCS | Mod: CPTII,,, | Performed by: INTERNAL MEDICINE

## 2023-01-24 PROCEDURE — 3074F SYST BP LT 130 MM HG: CPT | Mod: CPTII,,, | Performed by: INTERNAL MEDICINE

## 2023-01-24 PROCEDURE — 1159F MED LIST DOCD IN RCRD: CPT | Mod: CPTII,,, | Performed by: INTERNAL MEDICINE

## 2023-01-24 PROCEDURE — 3008F PR BODY MASS INDEX (BMI) DOCUMENTED: ICD-10-PCS | Mod: CPTII,,, | Performed by: INTERNAL MEDICINE

## 2023-01-24 PROCEDURE — 99213 OFFICE O/P EST LOW 20 MIN: CPT | Mod: PBBFAC | Performed by: INTERNAL MEDICINE

## 2023-01-24 PROCEDURE — 1160F RVW MEDS BY RX/DR IN RCRD: CPT | Mod: CPTII,,, | Performed by: INTERNAL MEDICINE

## 2023-01-24 PROCEDURE — 99214 OFFICE O/P EST MOD 30 MIN: CPT | Mod: S$PBB,,, | Performed by: INTERNAL MEDICINE

## 2023-01-24 PROCEDURE — 99999 PR PBB SHADOW E&M-EST. PATIENT-LVL III: CPT | Mod: PBBFAC,,, | Performed by: INTERNAL MEDICINE

## 2023-01-24 PROCEDURE — 93005 ELECTROCARDIOGRAM TRACING: CPT | Mod: PBBFAC | Performed by: INTERNAL MEDICINE

## 2023-01-24 PROCEDURE — 3074F PR MOST RECENT SYSTOLIC BLOOD PRESSURE < 130 MM HG: ICD-10-PCS | Mod: CPTII,,, | Performed by: INTERNAL MEDICINE

## 2023-01-24 PROCEDURE — 99999 PR PBB SHADOW E&M-EST. PATIENT-LVL III: ICD-10-PCS | Mod: PBBFAC,,, | Performed by: INTERNAL MEDICINE

## 2023-01-24 PROCEDURE — 1160F PR REVIEW ALL MEDS BY PRESCRIBER/CLIN PHARMACIST DOCUMENTED: ICD-10-PCS | Mod: CPTII,,, | Performed by: INTERNAL MEDICINE

## 2023-01-24 PROCEDURE — 93010 RHYTHM STRIP: ICD-10-PCS | Mod: S$PBB,,, | Performed by: INTERNAL MEDICINE

## 2023-01-24 RX ORDER — MELOXICAM 7.5 MG/1
7.5 TABLET ORAL
COMMUNITY
Start: 2023-01-06

## 2023-01-24 RX ORDER — ASPIRIN 81 MG/1
81 TABLET ORAL
Status: ON HOLD | COMMUNITY
Start: 2022-11-14 | End: 2023-06-05 | Stop reason: CLARIF

## 2023-01-24 RX ORDER — POTASSIUM CHLORIDE 1.5 G/1
20 POWDER, FOR SOLUTION ORAL
COMMUNITY
Start: 2022-12-20

## 2023-01-24 NOTE — PROGRESS NOTES
Subjective:    Patient ID:  Magalys Bass is a 62 y.o. female who presents for follow-up of Atrial Fibrillation      61 yoF chronic diastolic HF (EF 60%), pAF (on Eliquis), HTN, HLD, obesity s/p gastric sleeve here for arrhythmia evaluation.     9/22: She developed AF prior to 4/22 (last ECG was 2014, NSR). She had restrictive CM, MR and TR leading to MVR/TVR/Maze/LUCIA resection 8/10/22. Since her surgery she has had return of coarse AF and atypical AFL. She feels rapid palpitations at times. Overall she has recovered well since her surgery. EF 58% post-op. She denies syncope, near syncope, chest pain, SOB.     Interval history: Re-do PVI with CTI 11/21/22. Feels much better. SBr alternating with junctional rhythm today.     Ablation 11/22:  ·  3D mapping performed with Ensite.  ·  CTI ablation.  ·  Intracardiac echo.  ·  Pulmonary vein isolation.     Echo 8/22:  · The left ventricle is normal in size with concentric remodeling and normal systolic function.  · The estimated ejection fraction is 58%.  · There is abnormal septal wall motion.  · Severe left atrial enlargement.  · Mild right ventricular enlargement with low normal right ventricular systolic function.  · The mitral valve is s/p repair.  · There is moderate to severe mitral stenosis.  · The mean diastolic gradient across the mitral valve is 10 mmHg at a heart rate of 69 bpm; MVA 1.26.  · Normal central venous pressure (3 mmHg).  · Trivial posterolateral pericardial effusion.    Past Medical History:  No date: Anticoagulant long-term use  No date: Depression  No date: GERD (gastroesophageal reflux disease)  No date: Hypertension  No date: Medical history non-contributory  No date: Paroxysmal A-fib    Past Surgical History:  11/21/2022: ABLATION OF ARRHYTHMOGENIC FOCUS FOR ATRIAL FIBRILLATION;   N/A      Comment:  Procedure: Ablation atrial fibrillation;  Surgeon:                Omero Low MD;  Location: [facility] EP LAB;  Service:                Cardiology;  Laterality: N/A;  afib, PVI, RFA, CASTILLO (cx if               SR), GENIE, anes, MB, 3 Prep  4/13/2022: CORONARY ANGIOGRAPHY; N/A      Comment:  Procedure: ANGIOGRAM, CORONARY ARTERY;  Surgeon: Isauro Perdomo MD;  Location: [facility] CATH LAB;  Service:                Cardiology;  Laterality: N/A;  8/10/2022: KIRKPATRICK MAZE PROCEDURE; N/A      Comment:  Procedure: KIRKPATRICK MAZE PROCEDURE;  Surgeon: Manish Morris MD;  [facility];  Service:                Cardiothoracic;  Laterality: N/A;  Maze  No date: FINGER SURGERY; Left      Comment:  index fracture repair  08/2021: GALLBLADDER SURGERY  No date: KNEE ARTHROSCOPY  No date: SHOULDER ARTHROSCOPY  No date: SHOULDER SURGERY  11/21/2022: TREATMENT OF CARDIAC ARRHYTHMIA      Comment:  Procedure: Cardioversion or Defibrillation;  Surgeon:                Omero Low MD;  Location: [facility] EP LAB;  Service:               Cardiology;;  8/10/2022: TRICUSPID VALVULOPLASTY; N/A      Comment:  Procedure: REPAIR, TRICUSPID VALVE;  Surgeon: Manish Morris MD;  [facility];  Service:                Cardiothoracic;  Laterality: N/A;    Social History    Socioeconomic History      Marital status:     Tobacco Use      Smoking status: Former        Types: Cigarettes      Smokeless tobacco: Never    Substance and Sexual Activity      Alcohol use: No      Drug use: No      Sexual activity: Not Currently    Social History Narrative      ** Merged History Encounter **         Review of patient's family history indicates:    Current Outpatient Medications:  acetaminophen (TYLENOL) 500 MG tablet, Take 2 tablets (1,000 mg total) by mouth every 6 (six) hours as needed for Pain., Disp: 30 tablet, Rfl: 0  amLODIPine (NORVASC) 2.5 MG tablet, Take 1 tablet (2.5 mg total) by mouth once daily., Disp: 30 tablet, Rfl: 11  apixaban (ELIQUIS) 5 mg Tab, Take 1 tablet (5 mg total) by mouth 2 (two) times daily.,  Disp: 30 tablet, Rfl: 3  aspirin 325 MG tablet, Take 1 tablet (325 mg total) by mouth once daily. (Patient taking differently: Take 325 mg by mouth once daily. Pt states she currently takes 81 mg), Disp: 30 tablet, Rfl: 11  diclofenac sodium (VOLTAREN) 1 % Gel, Apply topically., Disp: , Rfl:   docusate sodium (COLACE) 100 MG capsule, Take 1 capsule (100 mg total) by mouth 2 (two) times daily as needed for Constipation., Disp: 30 capsule, Rfl: 0  ergocalciferol (ERGOCALCIFEROL) 50,000 unit Cap, Take 50,000 Units by mouth every 7 days., Disp: , Rfl:   fluticasone propionate (FLONASE) 50 mcg/actuation nasal spray, 1 spray by Each Nostril route once daily., Disp: , Rfl:   furosemide (LASIX) 20 MG tablet, Take 20 mg by mouth daily as needed., Disp: , Rfl:   hydroCHLOROthiazide (HYDRODIURIL) 25 MG tablet, Take 25 mg by mouth once daily., Disp: , Rfl:   ibuprofen (ADVIL,MOTRIN) 600 MG tablet, Take 600 mg by mouth every 6 (six) hours as needed., Disp: , Rfl:   nitrofurantoin, macrocrystal-monohydrate, (MACROBID) 100 MG capsule, Take 100 mg by mouth every 12 (twelve) hours., Disp: , Rfl:   omega-3 acid ethyl esters (LOVAZA) 1 gram capsule, Take 2 g by mouth 2 (two) times daily., Disp: , Rfl:   pantoprazole (PROTONIX) 40 MG tablet, Take 1 tablet (40 mg total) by mouth once daily., Disp: 30 tablet, Rfl: 0    No current facility-administered medications for this visit.  Facility-Administered Medications Ordered in Other Visits:  0.9%  NaCl infusion, , Intravenous, Continuous, Isauro Perdomo MD              Review of Systems   Constitutional: Negative.   HENT: Negative.     Eyes: Negative.    Cardiovascular:  Negative for chest pain, dyspnea on exertion, irregular heartbeat, leg swelling, near-syncope, palpitations and syncope.   Respiratory: Negative.  Negative for shortness of breath.    Endocrine: Negative.    Hematologic/Lymphatic: Negative.    Skin: Negative.    Musculoskeletal: Negative.    Gastrointestinal: Negative.     Genitourinary: Negative.    Neurological: Negative.  Negative for dizziness and light-headedness.   Psychiatric/Behavioral: Negative.     Allergic/Immunologic: Negative.       Objective:    Physical Exam  Vitals reviewed.   Constitutional:       General: She is not in acute distress.     Appearance: She is well-developed.   HENT:      Head: Normocephalic and atraumatic.   Eyes:      Pupils: Pupils are equal, round, and reactive to light.   Neck:      Thyroid: No thyromegaly.      Vascular: No JVD.   Cardiovascular:      Rate and Rhythm: Normal rate and regular rhythm.      Chest Wall: PMI is not displaced.      Heart sounds: Normal heart sounds, S1 normal and S2 normal. No murmur heard.    No friction rub. No gallop.   Pulmonary:      Effort: Pulmonary effort is normal. No respiratory distress.      Breath sounds: Normal breath sounds. No wheezing or rales.   Abdominal:      General: Bowel sounds are normal. There is no distension.      Palpations: Abdomen is soft.      Tenderness: There is no abdominal tenderness. There is no guarding or rebound.   Musculoskeletal:         General: No tenderness. Normal range of motion.      Cervical back: Normal range of motion.   Skin:     General: Skin is warm and dry.      Findings: No erythema or rash.   Neurological:      Mental Status: She is alert and oriented to person, place, and time.      Cranial Nerves: No cranial nerve deficit.   Psychiatric:         Behavior: Behavior normal.         Thought Content: Thought content normal.         Judgment: Judgment normal.     ECG: SBr alternating with junctional rhythm, nl QRS        Assessment:       1. H/O maze procedure    2. Paroxysmal atrial fibrillation         Plan:       62 yoF here for post ablation visit. Symptoms much better in NSR. Continue current therapy. No recurrence. RTC 6m

## 2023-03-21 ENCOUNTER — TELEPHONE (OUTPATIENT)
Dept: SURGERY | Facility: CLINIC | Age: 63
End: 2023-03-21
Payer: MEDICAID

## 2023-03-21 DIAGNOSIS — Z12.11 SCREENING FOR COLON CANCER: Primary | ICD-10-CM

## 2023-03-21 DIAGNOSIS — Z12.11 COLON CANCER SCREENING: Primary | ICD-10-CM

## 2023-03-21 RX ORDER — SODIUM PICOSULFATE, MAGNESIUM OXIDE, AND ANHYDROUS CITRIC ACID 10; 3.5; 12 MG/160ML; G/160ML; G/160ML
160 LIQUID ORAL
Qty: 320 ML | Refills: 0 | Status: ON HOLD | OUTPATIENT
Start: 2023-03-21 | End: 2023-05-10 | Stop reason: CLARIF

## 2023-03-21 RX ORDER — SODIUM CHLORIDE 0.9 % (FLUSH) 0.9 %
3 SYRINGE (ML) INJECTION
Status: CANCELLED | OUTPATIENT
Start: 2023-03-21

## 2023-03-21 NOTE — TELEPHONE ENCOUNTER
The patient has been advised the Colonoscopy Prep Kit will be ordered from the patient's verified preferred pharmacy on file. The medication can  be picked up by the patient, or the patient's designated representative.The patient was further explained the Colonoscopy Prep instructions will be mailed to the patient verified mailing address on file, or put onto the [email or web address] portal, whichever method of delivery the patient prefers.Additionally this patient was informed,not to follow the instructions that comes with the bowel prep medication. However, the patient was instructed to please follow the  Colonoscopy Bowel Prep instructions that are being provided by the . The patient was asked to please to follow the Colonoscopy Prep instructions being provided as precisely,and  meticulously as possible.The patient was advised you  will receive a follow up phone call to summarize the Colonoscopy Prep instructions prior to the scheduled Colonoscopy procedure date. At this time the patient will be given an opportunity to ask any questions regarding the Colonoscopy procedure, and it's associated Bowel Prep instructions.

## 2023-03-21 NOTE — TELEPHONE ENCOUNTER
Called patient in reference to a referral to Colorectal Surgery for colon cancer screening. Patient verbally consented to a Colonoscopy and requested to be scheduled for a Colonoscopy on 04/19/2023 - Pending Cardiac Clearance - Anticoagulant Therapy in use.Patient was advised a designated  is required on the day of the Colonoscopy to drive the patient home and the  must be at least. 18 years old.Colonoscopy Prep instructions were thoroughly explained and discussed with the patient.It was emphasized, and reiterated to the patient, to please not to follow the bowel prep instructions that comes with the bowel prep package.However, to please follow the prep instructions that will be received in the mail,or via the [employer] portal, or by both modes of delivery, which ever method of delivery the patient prefers,from the [facility]   Patient acknowledges understanding Prep instructions as explained and discussed on the phone.. Patient was advised the Colonoscopy Prep instructions discussed and explained on the phone,are being mailed out to the patient's verified address on file,or put onto the [employer] portal,or both methods of delivery, whichever the patient prefers. Patient's address on file was verified with the patient for accuracy of mailing. Patient's medications on file was reviewed with the patient for accuracy of information. Patient denies taking  any other medications other than those listed and verified on medication profile.Patient was explained the Colonoscopy will be performed here at [facility]. Patient was further explained the Pre-Op will call one day prior to the procedure date, to discuss Pre-Op instructions;and what time to report for the Colonoscopy. The patient was given the opportunity to ask any questions about the Colonoscopy. No further issues were discussed.

## 2023-03-21 NOTE — TELEPHONE ENCOUNTER
Called patient reference to a referral to  Ambulatory Colorectal Surgery for colon cancer screening, no answer,left voice message.

## 2023-04-18 ENCOUNTER — NURSE TRIAGE (OUTPATIENT)
Dept: ADMINISTRATIVE | Facility: CLINIC | Age: 63
End: 2023-04-18
Payer: MEDICAID

## 2023-04-18 NOTE — TELEPHONE ENCOUNTER
Pt is scheduled for a colonoscopy tomorrow morning. She ate red jello on accident twice today. Once at 1pm and once just now. Pt would like to know if she can continue with her prep as advised or if she should cancel her scope.     NT reaches out to [facility] for further assistance as no provider is listed as available on the on-call finder. [facility] MedSurg RN advises that someone will call pt back directly with further instructions. Pt is informed and instructed to call back with any new/worsening sxs, questions, or concerns. She verbalizes understanding.   Reason for Disposition   Nursing judgment    Protocols used: No Protocol [id number]

## 2023-04-19 ENCOUNTER — TELEPHONE (OUTPATIENT)
Dept: SURGERY | Facility: CLINIC | Age: 63
End: 2023-04-19
Payer: MEDICAID

## 2023-04-19 ENCOUNTER — TELEPHONE (OUTPATIENT)
Dept: GASTROENTEROLOGY | Facility: CLINIC | Age: 63
End: 2023-04-19
Payer: MEDICAID

## 2023-04-19 NOTE — TELEPHONE ENCOUNTER
Called the patient in reference to rescheduling the cancelled Colonoscopy. Patient chose to reschedule the Colonoscopy for 05/10/2023. Patient was informed the Colonoscopy Prep instructions received for the canceled Colonoscopy, remains applicable to the rescheduled Colonoscopy. Colonoscopy Prep instructions were reiterated,discussed and explained meticulously in minute,thorough detail. Patient acquiesced understanding of instructions. The patient was offered the opportunity ask any questions about the Colonoscopy procedure and the related Colonoscopy Prep instructions.  Nothing further was discussed. The Colonoscopy bowel prepo instructions were explained and discussed witgh this patient as follows:    [facility]   [address]  Clinic Office [phone]  Endoscopy Lab [phone]            CLENPIQ Instructions:  It was emphasized, and reiterated to the patient, to please not to follow the bowel prep instructions that comes with the bowel prep package.However, to please follow the prep instructions that will be received in the mail,or via the [employer] portal, or by both modes of delivery, which ever method of delivery the patient prefers,from the [facility]                    You are scheduled for a colonoscopy with Dr on 05/10//2023 at [facility] -  Enter through [address]    To ensure that your test is accurate and complete, you MUST follow these instructions listed below.  If you have any questions, please call our office at [phone] Plan on being at the hospital for your procedure for 3-4 hours. Please contact the office at [phone] two days prior to procedure date if reschedule is needed.         1.  Follow a CLEAR LIQUID DIET for the entire day before your scheduled colonoscopy.  This means no solid food the entire day starting when you wake.  You may have as much of the clear liquids as you want throughout the day.               CLEAR  LIQUID DIET:               - Avoid Red, Orange, Purple, and/or Blue food coloring               - NO DAIRY               - You can have:  Coffee with sugar (no creamer), tea, water, soda, apple or white grape juice, chicken or beef broth/bouillon (no meat, noodles, or veggies), green/yellow popsicles, green/yellow Jell-O, lemonade.    .It was emphasized, and reiterated to the patient, to please not to follow the bowel prep instructions that comes with the bowel prep package.However, to please follow the prep instructions that will be received in the mail,or via the [employer] portal, or by both modes of delivery, which ever method of delivery the patient prefers,from the [facility]          2.  AT 5 pm the evening before your colonoscopy, OPEN ONE (1) BOTTLE OF CLENPIQ AND DRINK THE ENTIRE BOTTLE.  DRINK FIVE (5) 8-OUNCE GLASSES OF WATER (40 OUNCES TOTAL) OVER THE NEXT FIVE (5) HOURS.         3.  The endoscopy department will call you 1 day before your colonoscopy to tell you the exact time to arrive, AND to tell you the exact time to drink the 2nd portion of your prep (which will be FIVE HOURS BEFORE YOUR ARRIVAL TIME).  At this time given to you, OPEN THE OTHER ONE (1) BOTTLE OF CLENPIQ AND DRINK THE ENTIRE BOTTLE.  DRINK THREE (3) 8-OUNCE GLASSES OF WATER (24 OUNCES TOTAL) OVER THE NEXT THREE (3) HOURS. Once this is complete, you can not have anything else by mouth!         4.  You must have someone with you to DRIVE YOU HOME since you will be receiving IV sedation        5.  It is ok to take MOST of your REGULAR MEDICATIONS  in the morning of your test with a SIP of water.  THE ONLY MEDS YOU NEED TO HOLD ARE YOUR DIABETES MEDICATIONS,         SOME BLOOD PRESSURE MEDS, AND BLOOD THINNERS IF OK'D BY YOUR DOCTOR.  Do NOT have anything else to eat or drink the morning of your colonoscopy.  It is ok to brush your teeth.         6.  If you are on blood thinners THAT YOU HAVE BEEN INSTRUCTED TO HOLD BY YOUR  DOCTOR FOR THIS PROCEDURE, then do NOT take this the morning of your colonoscopy.  Do NOT stop these medications on your own, they must be approved to be held by your doctor.  Your colonoscopy can NOT be done if you are on these medications.  Examples of blood thinners include: Coumadin, Aggrenox, Plavix, Pradaxa, Reapro, Pletal, Xarelto, Ticagrelor, Brilinta, Eliquis, and high dose aspirin (325 mg).  You do not have to stop baby aspirin 81 mg.         7.  IF YOU ARE DIABETIC:  NO INSULIN OR ORAL MEDICATIONS THE MORNING OF THE COLONOSCOPY.  TAKE ONLY HALF THE DOSE OF YOUR INSULIN THE DAY BEFORE THE COLONOSCOPY.  DO NOT TAKE ANY ORAL DIABETIC MEDICATIONS THE DAY BEFORE THE COLONOSCOPY.  IF YOU ARE AN INSULIN DEPENDENT DIABETIC WITH UNSTABLE BLOOD SUGARS, NOTIFY YOUR PRIMARY CARE PHYSICIAN FOR INSTRUCTIONS.         8.  Please DO use your inhalers the morning of your procedure.  Sincerely,  Ismael Vaca -  - [phone]

## 2023-04-19 NOTE — TELEPHONE ENCOUNTER
I called the patient to reschedule her colonoscopy because she ate red jello the day before. I left a message for her to call [phone] to get her rescheduled.

## 2023-05-11 ENCOUNTER — DOCUMENTATION ONLY (OUTPATIENT)
Dept: SURGERY | Facility: CLINIC | Age: 63
End: 2023-05-11
Payer: MEDICAID

## 2023-05-11 NOTE — PROGRESS NOTES
This patient has been scheduled an appointment with Dr Reese Edwards MD Cardiology on 05/19/2023 to assess for Cardiac Clearance Pre Endoscopy procedure.

## 2023-05-11 NOTE — CARE UPDATE
A call was received from Dr Reese Edwards'S office advising this patient has an appointment for 05/19/2023.

## 2023-06-05 PROBLEM — R10.9 ABDOMINAL PAIN: Status: ACTIVE | Noted: 2023-06-05

## 2023-08-13 ENCOUNTER — HOSPITAL ENCOUNTER (OUTPATIENT)
Facility: OTHER | Age: 63
Discharge: HOME | End: 2023-08-14
Attending: EMERGENCY MEDICINE | Admitting: EMERGENCY MEDICINE
Payer: MEDICAID

## 2023-08-13 DIAGNOSIS — R00.1 BRADYCARDIA: ICD-10-CM

## 2023-08-13 DIAGNOSIS — R42 LIGHTHEADEDNESS: ICD-10-CM

## 2023-08-13 DIAGNOSIS — R42 VERTIGO: ICD-10-CM

## 2023-08-13 DIAGNOSIS — R42 DIZZINESS: ICD-10-CM

## 2023-08-13 DIAGNOSIS — E87.6 HYPOKALEMIA: Primary | ICD-10-CM

## 2023-08-13 LAB
ALBUMIN SERPL BCP-MCNC: 2.9 G/DL (ref 3.5–5.2)
ALP SERPL-CCNC: 115 U/L (ref 55–135)
ALT SERPL W/O P-5'-P-CCNC: 9 U/L (ref 10–44)
ANION GAP SERPL CALC-SCNC: 13 MMOL/L (ref 8–16)
AST SERPL-CCNC: 19 U/L (ref 10–40)
BASOPHILS # BLD AUTO: 0.02 K/UL (ref 0–0.2)
BASOPHILS NFR BLD: 0.5 % (ref 0–1.9)
BILIRUB SERPL-MCNC: 0.5 MG/DL (ref 0.1–1)
BILIRUB UR QL STRIP: NEGATIVE
BUN SERPL-MCNC: 13 MG/DL (ref 8–23)
CALCIUM SERPL-MCNC: 8.7 MG/DL (ref 8.7–10.5)
CHLORIDE SERPL-SCNC: 100 MMOL/L (ref 95–110)
CLARITY UR: CLEAR
CO2 SERPL-SCNC: 30 MMOL/L (ref 23–29)
COLOR UR: YELLOW
CREAT SERPL-MCNC: 1 MG/DL (ref 0.5–1.4)
DIFFERENTIAL METHOD: ABNORMAL
EOSINOPHIL # BLD AUTO: 0.1 K/UL (ref 0–0.5)
EOSINOPHIL NFR BLD: 3.5 % (ref 0–8)
ERYTHROCYTE [DISTWIDTH] IN BLOOD BY AUTOMATED COUNT: 15 % (ref 11.5–14.5)
EST. GFR  (NO RACE VARIABLE): >60 ML/MIN/1.73 M^2
GLUCOSE SERPL-MCNC: 108 MG/DL (ref 70–110)
GLUCOSE UR QL STRIP: NEGATIVE
HCT VFR BLD AUTO: 35.8 % (ref 37–48.5)
HGB BLD-MCNC: 11.2 G/DL (ref 12–16)
HGB UR QL STRIP: NEGATIVE
IMM GRANULOCYTES # BLD AUTO: 0 K/UL (ref 0–0.04)
IMM GRANULOCYTES NFR BLD AUTO: 0 % (ref 0–0.5)
KETONES UR QL STRIP: NEGATIVE
LEUKOCYTE ESTERASE UR QL STRIP: NEGATIVE
LYMPHOCYTES # BLD AUTO: 1.3 K/UL (ref 1–4.8)
LYMPHOCYTES NFR BLD: 33.2 % (ref 18–48)
MAGNESIUM SERPL-MCNC: 2 MG/DL (ref 1.6–2.6)
MCH RBC QN AUTO: 23.5 PG (ref 27–31)
MCHC RBC AUTO-ENTMCNC: 31.3 G/DL (ref 32–36)
MCV RBC AUTO: 75 FL (ref 82–98)
MONOCYTES # BLD AUTO: 0.4 K/UL (ref 0.3–1)
MONOCYTES NFR BLD: 10.4 % (ref 4–15)
NEUTROPHILS # BLD AUTO: 2.1 K/UL (ref 1.8–7.7)
NEUTROPHILS NFR BLD: 52.4 % (ref 38–73)
NITRITE UR QL STRIP: NEGATIVE
NRBC BLD-RTO: 0 /100 WBC
PH UR STRIP: 7 [PH] (ref 5–8)
PLATELET # BLD AUTO: 200 K/UL (ref 150–450)
PMV BLD AUTO: 10.9 FL (ref 9.2–12.9)
POCT GLUCOSE: 112 MG/DL (ref 70–110)
POTASSIUM BLD-SCNC: 2.7 MMOL/L (ref 3.5–5.1)
POTASSIUM SERPL-SCNC: 2.6 MMOL/L (ref 3.5–5.1)
PROT SERPL-MCNC: 6.9 G/DL (ref 6–8.4)
PROT UR QL STRIP: NEGATIVE
RBC # BLD AUTO: 4.77 M/UL (ref 4–5.4)
SAMPLE: ABNORMAL
SODIUM SERPL-SCNC: 143 MMOL/L (ref 136–145)
SP GR UR STRIP: 1.01 (ref 1–1.03)
TROPONIN I SERPL DL<=0.01 NG/ML-MCNC: <0.006 NG/ML (ref 0–0.03)
TSH SERPL DL<=0.005 MIU/L-ACNC: 1.05 UIU/ML (ref 0.4–4)
URN SPEC COLLECT METH UR: ABNORMAL
UROBILINOGEN UR STRIP-ACNC: ABNORMAL EU/DL
WBC # BLD AUTO: 3.95 K/UL (ref 3.9–12.7)

## 2023-08-13 PROCEDURE — 99900035 HC TECH TIME PER 15 MIN (STAT)

## 2023-08-13 PROCEDURE — 85025 COMPLETE CBC W/AUTO DIFF WBC: CPT | Performed by: NURSE PRACTITIONER

## 2023-08-13 PROCEDURE — 93005 ELECTROCARDIOGRAM TRACING: CPT

## 2023-08-13 PROCEDURE — 84484 ASSAY OF TROPONIN QUANT: CPT | Performed by: NURSE PRACTITIONER

## 2023-08-13 PROCEDURE — 84443 ASSAY THYROID STIM HORMONE: CPT | Performed by: NURSE PRACTITIONER

## 2023-08-13 PROCEDURE — 93010 ELECTROCARDIOGRAM REPORT: CPT | Mod: ,,, | Performed by: INTERNAL MEDICINE

## 2023-08-13 PROCEDURE — 96375 TX/PRO/DX INJ NEW DRUG ADDON: CPT

## 2023-08-13 PROCEDURE — G0378 HOSPITAL OBSERVATION PER HR: HCPCS

## 2023-08-13 PROCEDURE — 84132 ASSAY OF SERUM POTASSIUM: CPT | Mod: 91

## 2023-08-13 PROCEDURE — 63600175 PHARM REV CODE 636 W HCPCS

## 2023-08-13 PROCEDURE — 96365 THER/PROPH/DIAG IV INF INIT: CPT

## 2023-08-13 PROCEDURE — 99285 EMERGENCY DEPT VISIT HI MDM: CPT | Mod: 25

## 2023-08-13 PROCEDURE — 25000003 PHARM REV CODE 250

## 2023-08-13 PROCEDURE — 81003 URINALYSIS AUTO W/O SCOPE: CPT | Performed by: NURSE PRACTITIONER

## 2023-08-13 PROCEDURE — 83735 ASSAY OF MAGNESIUM: CPT | Performed by: NURSE PRACTITIONER

## 2023-08-13 PROCEDURE — 82962 GLUCOSE BLOOD TEST: CPT

## 2023-08-13 PROCEDURE — 82803 BLOOD GASES ANY COMBINATION: CPT

## 2023-08-13 PROCEDURE — 93010 EKG 12-LEAD: ICD-10-PCS | Mod: 59,,, | Performed by: INTERNAL MEDICINE

## 2023-08-13 PROCEDURE — 93010 ELECTROCARDIOGRAM REPORT: CPT | Mod: 59,,, | Performed by: INTERNAL MEDICINE

## 2023-08-13 PROCEDURE — 80053 COMPREHEN METABOLIC PANEL: CPT | Performed by: NURSE PRACTITIONER

## 2023-08-13 RX ORDER — KETOROLAC TROMETHAMINE 30 MG/ML
15 INJECTION, SOLUTION INTRAMUSCULAR; INTRAVENOUS EVERY 6 HOURS PRN
Status: DISCONTINUED | OUTPATIENT
Start: 2023-08-13 | End: 2023-08-14 | Stop reason: HOSPADM

## 2023-08-13 RX ORDER — SODIUM CHLORIDE AND POTASSIUM CHLORIDE 150; 900 MG/100ML; MG/100ML
INJECTION, SOLUTION INTRAVENOUS CONTINUOUS
Status: DISCONTINUED | OUTPATIENT
Start: 2023-08-13 | End: 2023-08-14 | Stop reason: HOSPADM

## 2023-08-13 RX ORDER — ONDANSETRON 2 MG/ML
4 INJECTION INTRAMUSCULAR; INTRAVENOUS EVERY 8 HOURS PRN
Status: DISCONTINUED | OUTPATIENT
Start: 2023-08-13 | End: 2023-08-14 | Stop reason: HOSPADM

## 2023-08-13 RX ORDER — FAMOTIDINE 10 MG/ML
20 INJECTION INTRAVENOUS
Status: COMPLETED | OUTPATIENT
Start: 2023-08-13 | End: 2023-08-13

## 2023-08-13 RX ORDER — FAMOTIDINE 10 MG/ML
20 INJECTION INTRAVENOUS
Status: DISCONTINUED | OUTPATIENT
Start: 2023-08-13 | End: 2023-08-13

## 2023-08-13 RX ORDER — FAMOTIDINE 20 MG/1
20 TABLET, FILM COATED ORAL
Status: DISCONTINUED | OUTPATIENT
Start: 2023-08-13 | End: 2023-08-13

## 2023-08-13 RX ORDER — POTASSIUM CHLORIDE 7.45 MG/ML
10 INJECTION INTRAVENOUS
Status: COMPLETED | OUTPATIENT
Start: 2023-08-13 | End: 2023-08-13

## 2023-08-13 RX ORDER — ACETAMINOPHEN 325 MG/1
650 TABLET ORAL EVERY 8 HOURS PRN
Status: DISCONTINUED | OUTPATIENT
Start: 2023-08-13 | End: 2023-08-14 | Stop reason: HOSPADM

## 2023-08-13 RX ORDER — MECLIZINE HYDROCHLORIDE 25 MG/1
25 TABLET ORAL
Status: COMPLETED | OUTPATIENT
Start: 2023-08-13 | End: 2023-08-13

## 2023-08-13 RX ORDER — POTASSIUM CHLORIDE 20 MEQ/1
40 TABLET, EXTENDED RELEASE ORAL
Status: COMPLETED | OUTPATIENT
Start: 2023-08-13 | End: 2023-08-13

## 2023-08-13 RX ORDER — TALC
6 POWDER (GRAM) TOPICAL NIGHTLY PRN
Status: DISCONTINUED | OUTPATIENT
Start: 2023-08-13 | End: 2023-08-14 | Stop reason: HOSPADM

## 2023-08-13 RX ORDER — MECLIZINE HYDROCHLORIDE 25 MG/1
25 TABLET ORAL 2 TIMES DAILY PRN
Status: DISCONTINUED | OUTPATIENT
Start: 2023-08-13 | End: 2023-08-14 | Stop reason: HOSPADM

## 2023-08-13 RX ORDER — SODIUM CHLORIDE 0.9 % (FLUSH) 0.9 %
10 SYRINGE (ML) INJECTION
Status: DISCONTINUED | OUTPATIENT
Start: 2023-08-13 | End: 2023-08-14 | Stop reason: HOSPADM

## 2023-08-13 RX ADMIN — POTASSIUM CHLORIDE 40 MEQ: 1500 TABLET, EXTENDED RELEASE ORAL at 07:08

## 2023-08-13 RX ADMIN — SODIUM CHLORIDE 1000 ML: 9 INJECTION, SOLUTION INTRAVENOUS at 08:08

## 2023-08-13 RX ADMIN — FAMOTIDINE 20 MG: 10 INJECTION, SOLUTION INTRAVENOUS at 08:08

## 2023-08-13 RX ADMIN — POTASSIUM CHLORIDE AND SODIUM CHLORIDE: 900; 150 INJECTION, SOLUTION INTRAVENOUS at 11:08

## 2023-08-13 RX ADMIN — MECLIZINE HYDROCHLORIDE 25 MG: 25 TABLET ORAL at 08:08

## 2023-08-13 RX ADMIN — POTASSIUM CHLORIDE 10 MEQ: 10 INJECTION, SOLUTION INTRAVENOUS at 08:08

## 2023-08-13 NOTE — Clinical Note
Diagnosis: Hypokalemia [189158]   Future Attending Provider: BLACK PAYNE [[id number]]   Is the patient being sent to ED Observation?: Yes   Admitting Provider:: WENDY ADAMS [[id number]]

## 2023-08-13 NOTE — ED TRIAGE NOTES
Magalys Bass, an 62 y.o. female presents to the ED c.o. dizziness. Pt reports dizziness and lightheadedness for the past 5 days. [id number]      Chief Complaint   Patient presents with    Dizziness     Pt reports dizziness and lightheadedness when laying down x 5 days; pt denies any SOB, CP, nausea or vomiting     Review of patient's allergies indicates:   Allergen Reactions    Morphine Itching     Past Medical History:   Diagnosis Date    Anticoagulant long-term use     Arthritis     Bilateral carotid artery stenosis     Chronic diastolic heart failure     Depression     GERD (gastroesophageal reflux disease)     History of maze procedure     HLD (hyperlipidemia)     Hypertension     Medical history non-contributory     Nonrheumatic mitral valve regurgitation     Paroxysmal A-fib

## 2023-08-13 NOTE — FIRST PROVIDER EVALUATION
Emergency Department TeleTriage Encounter Note      CHIEF COMPLAINT    Chief Complaint   Patient presents with    Dizziness     Pt reports dizziness and lightheadedness when laying down x 5 days; pt denies any SOB, CP, nausea or vomiting       VITAL SIGNS   Initial Vitals [08/13/23 1656]   BP Pulse Resp Temp SpO2   (!) 197/80 (!) 51 20 98.2 °F (36.8 °C) 100 %      MAP       --            ALLERGIES    Review of patient's allergies indicates:   Allergen Reactions    Morphine Itching       PROVIDER TRIAGE NOTE  62 year old female presents to the ER with complaints of lightheadedness and dizziness described as the room spinning for the past 5 days. Symptoms seem to worsen when she is lying down but also occur when movement and when she is sitting upright. Not described as positional. Denies LOC. No headache. Reports history of HTN and has been taking her BP meds as prescribed. Also reports blurred vision bilaterally that occurs when lightheadedness symptoms seem to exacerbate. Denies HA, CP, or SOB.     Exam: AAOx3, respirations even and non labored, no focal neuro deficits on exam, appears in no acute distress. Elevated BP and bradycardic on initial arrival to ER.           ORDERS  Labs Reviewed - No data to display    ED Orders (720h ago, onward)      None              Virtual Visit Note: The provider triage portion of this emergency department evaluation and documentation was performed via [employer], a HIPAA-compliant telemedicine application, in concert with a tele-presenter in the room. A face to face patient evaluation with one of my colleagues will occur once the patient is placed in an emergency department room.      DISCLAIMER: This note was prepared with [employer] voice recognition transcription software. Garbled syntax, mangled pronouns, and other bizarre constructions may be attributed to that software system.

## 2023-08-14 VITALS
TEMPERATURE: 98 F | HEART RATE: 63 BPM | DIASTOLIC BLOOD PRESSURE: 69 MMHG | WEIGHT: 260 LBS | OXYGEN SATURATION: 100 % | RESPIRATION RATE: 16 BRPM | HEIGHT: 65 IN | BODY MASS INDEX: 43.32 KG/M2 | SYSTOLIC BLOOD PRESSURE: 175 MMHG

## 2023-08-14 PROBLEM — E21.1 HYPERPARATHYROIDISM , SECONDARY, NON-RENAL: Status: ACTIVE | Noted: 2020-04-27

## 2023-08-14 PROBLEM — I10 BENIGN ESSENTIAL HYPERTENSION: Status: ACTIVE | Noted: 2020-03-04

## 2023-08-14 LAB
ANION GAP SERPL CALC-SCNC: 11 MMOL/L (ref 8–16)
BUN SERPL-MCNC: 12 MG/DL (ref 8–23)
CALCIUM SERPL-MCNC: 8.2 MG/DL (ref 8.7–10.5)
CHLORIDE SERPL-SCNC: 105 MMOL/L (ref 95–110)
CO2 SERPL-SCNC: 27 MMOL/L (ref 23–29)
CREAT SERPL-MCNC: 0.8 MG/DL (ref 0.5–1.4)
EST. GFR  (NO RACE VARIABLE): >60 ML/MIN/1.73 M^2
GLUCOSE SERPL-MCNC: 95 MG/DL (ref 70–110)
POTASSIUM SERPL-SCNC: 2.8 MMOL/L (ref 3.5–5.1)
POTASSIUM SERPL-SCNC: 3.4 MMOL/L (ref 3.5–5.1)
SODIUM SERPL-SCNC: 143 MMOL/L (ref 136–145)

## 2023-08-14 PROCEDURE — 80048 BASIC METABOLIC PNL TOTAL CA: CPT

## 2023-08-14 PROCEDURE — G0378 HOSPITAL OBSERVATION PER HR: HCPCS

## 2023-08-14 PROCEDURE — 36415 COLL VENOUS BLD VENIPUNCTURE: CPT | Performed by: NURSE PRACTITIONER

## 2023-08-14 PROCEDURE — 25000003 PHARM REV CODE 250: Performed by: EMERGENCY MEDICINE

## 2023-08-14 PROCEDURE — 96361 HYDRATE IV INFUSION ADD-ON: CPT

## 2023-08-14 PROCEDURE — 25000003 PHARM REV CODE 250: Performed by: NURSE PRACTITIONER

## 2023-08-14 PROCEDURE — 63600175 PHARM REV CODE 636 W HCPCS

## 2023-08-14 PROCEDURE — 84132 ASSAY OF SERUM POTASSIUM: CPT | Mod: 91 | Performed by: NURSE PRACTITIONER

## 2023-08-14 RX ORDER — MECLIZINE HYDROCHLORIDE 25 MG/1
25 TABLET ORAL 3 TIMES DAILY PRN
Qty: 30 TABLET | Refills: 0 | Status: SHIPPED | OUTPATIENT
Start: 2023-08-14 | End: 2023-08-24

## 2023-08-14 RX ORDER — POTASSIUM CHLORIDE 20 MEQ/1
40 TABLET, EXTENDED RELEASE ORAL
Status: COMPLETED | OUTPATIENT
Start: 2023-08-14 | End: 2023-08-14

## 2023-08-14 RX ORDER — LANOLIN ALCOHOL/MO/W.PET/CERES
400 CREAM (GRAM) TOPICAL ONCE
Status: COMPLETED | OUTPATIENT
Start: 2023-08-14 | End: 2023-08-14

## 2023-08-14 RX ADMIN — Medication 400 MG: at 06:08

## 2023-08-14 RX ADMIN — POTASSIUM CHLORIDE AND SODIUM CHLORIDE: 900; 150 INJECTION, SOLUTION INTRAVENOUS at 09:08

## 2023-08-14 RX ADMIN — POTASSIUM CHLORIDE 40 MEQ: 1500 TABLET, EXTENDED RELEASE ORAL at 11:08

## 2023-08-14 RX ADMIN — POTASSIUM BICARBONATE 40 MEQ: 391 TABLET, EFFERVESCENT ORAL at 06:08

## 2023-08-14 NOTE — ED NOTES
Pt successfully completed shower and ambulated back to the bed with a steady gait. AAOx4, respirations even and unlabored, INAD

## 2023-08-14 NOTE — H&P
ED Observation Unit  History and Physical      I assumed care of this patient from the Main ED at onset of observation time, 10:33 p.m on 08/13/2023.       History of Present Illness:  Magalys Bass is a 62 y.o. female with history of HTN, HLD, paroxysmal A Fib, mitral valve regurgitation, and bilateral carotid artery stenosis presenting to the emergency department for evaluation of intermittent, positional dizziness for the past 5 days. She reports room spinning sensation whenever she moves her head too quickly, moves from a seated to standing position too quickly and with ambulation. She denies history of vertigo. She denies head trauma or LOC. Reports associated muffled hearing in bilateral ears.  She denies ear pain, ear drainage, or tinnitus.  She denies fever, chills, headaches, weakness, paresthesias, shortness of breath, chest pain, abdominal pain, nausea, vomiting, diarrhea, constipation, urinary symptoms, vaginal bleeding, vaginal discharge.    ED Course:  - CT negative  - resolution of dizziness with meclizine  - CMP showed hypokalemia of 2.6.  Known chronic hypokalemia, baseline approximately 3.0-3.1.  Supposed to be taking daily oral potassium, however, has been noncompliant.  - EKG with some changes from previous.  Presence of U-waves.  No complaints of chest pain or shortness of breath.  - received p.o. and IV potassium  - repeat EKG improved, resolution of U-waves  - repeat potassium 2.7  - admitted to EDOU for further potassium replacement and recheck labs in am    I reviewed the ED Provider Note dated 8/13/2023 prior to my evaluation of this patient.  I reviewed all labs and imaging performed in the Main ED, prior to patient being placed in Observation. Patient was placed in the ED Observation Unit for hypokalemia.    PMHx   Past Medical History:   Diagnosis Date    Anticoagulant long-term use     Arthritis     Bilateral carotid artery stenosis     Chronic diastolic heart failure      Depression     GERD (gastroesophageal reflux disease)     History of maze procedure     HLD (hyperlipidemia)     Hypertension     Medical history non-contributory     Nonrheumatic mitral valve regurgitation     Paroxysmal A-fib       Past Surgical History:   Procedure Laterality Date    ABLATION OF ARRHYTHMOGENIC FOCUS FOR ATRIAL FIBRILLATION N/A 11/21/2022    Procedure: Ablation atrial fibrillation;  Surgeon: Omero Low MD;  Location: [facility] EP LAB;  Service: Cardiology;  Laterality: N/A;  afib, PVI, RFA, CASTILLO (cx if SR), GENIE, anes, MB, 3 Prep    COLONOSCOPY N/A 05/10/2023    Procedure: COLONOSCOPY;  Surgeon: Fredy Alegre MD;  Location: [facility] ENDO;  Service: Endoscopy;  Laterality: N/A;    CORONARY ANGIOGRAPHY N/A 04/13/2022    Procedure: ANGIOGRAM, CORONARY ARTERY;  Surgeon: Isauro Perdomo MD;  Location: [facility] CATH LAB;  Service: Cardiology;  Laterality: N/A;    KIRKPATRICK MAZE PROCEDURE N/A 08/10/2022    Procedure: KIRKPATRICK MAZE PROCEDURE;  Surgeon: Manish Morris MD;  Location: [facility] OR [address] [place] [address];  Service: Cardiothoracic;  Laterality: N/A;  Maze    EGD, WITH CLOSED BIOPSY  06/05/2023    ESOPHAGOGASTRODUODENOSCOPY N/A 6/5/2023    Procedure: EGD (ESOPHAGOGASTRODUODENOSCOPY);  Surgeon: Fredy Alegre MD;  Location: [facility];  Service: Endoscopy;  Laterality: N/A;    FINGER SURGERY Left     index fracture repair    GALLBLADDER SURGERY  08/2021    KNEE ARTHROSCOPY      SHOULDER ARTHROSCOPY      SHOULDER SURGERY      TREATMENT OF CARDIAC ARRHYTHMIA  11/21/2022    Procedure: Cardioversion or Defibrillation;  Surgeon: Omreo Low MD;  Location: [facility] EP LAB;  Service: Cardiology;;    TRICUSPID VALVULOPLASTY N/A 08/10/2022    Procedure: REPAIR, TRICUSPID VALVE;  Surgeon: Manish Morris MD;  Location: [facility] OR [address] [place] [address];  Service: Cardiothoracic;  Laterality: N/A;        Family Hx   Family History   Problem Relation Age of Onset    Stroke Mother         Social Hx   Social History     Socioeconomic History     Marital status:    Tobacco Use    Smoking status: Former     Current packs/day: 0.00     Types: Cigarettes    Smokeless tobacco: Never   Substance and Sexual Activity    Alcohol use: No    Drug use: No    Sexual activity: Not Currently   Social History Narrative    ** Merged History Encounter **             Vital Signs   Vitals:    08/13/23 1825 08/13/23 1826 08/13/23 1919 08/13/23 1931   BP: (!) 169/72 (!) 165/72 133/62 139/67   BP Location: Left arm Left arm     Patient Position: Sitting Standing     Pulse: (!) 52 (!) 55 (!) 47 (!) 45   Resp:   18    Temp:       TempSrc:       SpO2: 97% 97% 99%    Weight:       Height:            Review of Systems  Review of Systems   Constitutional:  Negative for chills and fever.   HENT:  Negative for congestion, nosebleeds and sore throat.    Eyes:  Negative for blurred vision, double vision and photophobia.   Respiratory:  Negative for cough and shortness of breath.    Cardiovascular:  Negative for chest pain, claudication and leg swelling.   Gastrointestinal:  Negative for diarrhea, nausea and vomiting.   Genitourinary:  Negative for dysuria and urgency.   Musculoskeletal:  Negative for back pain and neck pain.   Skin:  Negative for itching and rash.   Neurological:  Negative for dizziness, weakness and headaches.       Physical Exam  Physical Exam  Constitutional:       General: She is not in acute distress.     Appearance: Normal appearance. She is not toxic-appearing.   HENT:      Head: Normocephalic and atraumatic.      Nose: Nose normal.      Mouth/Throat:      Mouth: Mucous membranes are moist.   Cardiovascular:      Rate and Rhythm: Normal rate and regular rhythm.   Pulmonary:      Effort: Pulmonary effort is normal. No respiratory distress.   Abdominal:      General: Abdomen is flat. There is no distension.   Musculoskeletal:         General: Normal range of motion.      Cervical back: Normal range of motion.   Skin:     General: Skin is warm and dry.    Neurological:      General: No focal deficit present.      Mental Status: She is alert and oriented to person, place, and time. Mental status is at baseline.      Gait: Gait normal.   Psychiatric:         Mood and Affect: Mood normal.         Behavior: Behavior normal.         Medications:   Scheduled Meds:  Continuous Infusions:   0/9% NACL & POTASSIUM CHLORIDE 20 MEQ/L       PRN Meds:.acetaminophen, ketorolac, meclizine, melatonin, ondansetron, sodium chloride 0.9%      Assessment/Plan:  Hypokalemia  - continuous IV NS with potassium infusion  - recheck potassium and morning  - cardiac monitoring  - repeat EKG in morning if still hypokalemic

## 2023-08-14 NOTE — DISCHARGE INSTRUCTIONS
If you begin to develop any of these symptoms:    1) Return of dizziness  2) Shortness of breath  3) Chest pain    Please return to the ED for continued evaluation and treatment.

## 2023-08-14 NOTE — PROVIDER PROGRESS NOTES - EMERGENCY DEPT.
Encounter Date: 8/13/2023    ED Physician Progress Notes          ED Physician Hand-off Note:    ED Course: I assumed care of patient from off-going ED primary team. Briefly, patient is a 62 y.o female with chronic diastolic HF (EF 60%), A fib (on Eliquis), HTN, HLD, obesity s/p gastric sleeve who presents to the ED with complaints of dizziness. Patient only with complaints while moving and does not have any complaints while the patient is sitting in the hospital bed. Noted to be hypokalemic at 2.6 and initial EKG with u waves not on prior EKG. No complaints of CP or SOB. Patient with chronic hypokalemia, baseline around 3.1 and on PO potassium daily which she states she has not been compliant with.     At the time of signout, the plan was pending replete potassium.    Medications given in the ED:    Medications   potassium chloride SA CR tablet 40 mEq (40 mEq Oral Given 8/13/23 1907)   famotidine (PF) injection 20 mg (20 mg Intravenous Given 8/13/23 2054)   potassium chloride 10 mEq in 100 mL IVPB (10 mEq Intravenous New Bag 8/13/23 2056)   meclizine tablet 25 mg (25 mg Oral Given 8/13/23 2055)   sodium chloride 0.9% bolus 1,000 mL 1,000 mL (1,000 mLs Intravenous New Bag 8/13/23 2055)       ED course:    Patient did have noticeable horizontal nystagmus and symptoms were reproducible upon laying her flat quickly. She was given meclizine. Patient was ambulated approximately a 1/2 hour after meclizine administration and was without any symptoms of dizziness.  She was then laid flat and I was not able to reproduce her symptoms.  Likely BPPV which can be treated with meclizine p.r.n..    Patient received oral and IV potassium. Repeat potassium 2.7. Repeat EKG is improved, resolution of u wave.    Plan to admit to EDOU for continued potassium repletion and recheck labs in morning.    Disposition: EDOU    Impression: Final diagnoses:  [R42] Lightheadedness  [R42] Dizziness  [E87.6] Hypokalemia (Primary)

## 2023-08-14 NOTE — ED PROVIDER NOTES
Encounter Date: 8/13/2023       History     Chief Complaint   Patient presents with    Dizziness     Pt reports dizziness and lightheadedness when laying down x 5 days; pt denies any SOB, CP, nausea or vomiting     Magalys Bass is a 62 y.o. female with history of HTN, HLD, paroxysmal A Fib, mitral valve regurgitation, and bilateral carotid artery stenosis presenting to the emergency department for evaluation of intermittent, positional dizziness for the past 5 days. She reports room spinning sensation whenever she moves her head too quickly, moves from a seated to standing position too quickly and with ambulation. She denies history of vertigo. She denies head trauma or LOC. Reports associated muffled hearing in bilateral ears.  She denies ear pain, ear drainage, or tinnitus.  She denies fever, chills, headaches, weakness, paresthesias, shortness of breath, chest pain, abdominal pain, nausea, vomiting, diarrhea, constipation, urinary symptoms, vaginal bleeding, vaginal discharge.      The history is provided by the patient.     Review of patient's allergies indicates:   Allergen Reactions    Morphine Itching     Past Medical History:   Diagnosis Date    Anticoagulant long-term use     Arthritis     Bilateral carotid artery stenosis     Chronic diastolic heart failure     Depression     GERD (gastroesophageal reflux disease)     History of maze procedure     HLD (hyperlipidemia)     Hypertension     Medical history non-contributory     Nonrheumatic mitral valve regurgitation     Paroxysmal A-fib      Past Surgical History:   Procedure Laterality Date    ABLATION OF ARRHYTHMOGENIC FOCUS FOR ATRIAL FIBRILLATION N/A 11/21/2022    Procedure: Ablation atrial fibrillation;  Surgeon: Omero Low MD;  Location: [facility] EP LAB;  Service: Cardiology;  Laterality: N/A;  afib, PVI, RFA, CASTILLO (cx if SR), GENIE, sulema, MB, 3 Prep    COLONOSCOPY N/A 05/10/2023    Procedure: COLONOSCOPY;  Surgeon: Fredy CHAVARRIA  MD Sis;  Location: [facility] ENDO;  Service: Endoscopy;  Laterality: N/A;    CORONARY ANGIOGRAPHY N/A 04/13/2022    Procedure: ANGIOGRAM, CORONARY ARTERY;  Surgeon: Isauro Perdomo MD;  Location: [facility] CATH LAB;  Service: Cardiology;  Laterality: N/A;    KIRKPATRICK MAZE PROCEDURE N/A 08/10/2022    Procedure: KIRKPATRICK MAZE PROCEDURE;  Surgeon: Manish Morris MD;  [facility];  Service: Cardiothoracic;  Laterality: N/A;  Maze    EGD, WITH CLOSED BIOPSY  06/05/2023    ESOPHAGOGASTRODUODENOSCOPY N/A 6/5/2023    Procedure: EGD (ESOPHAGOGASTRODUODENOSCOPY);  Surgeon: Fredy Alegre MD;  Location: [facility] ENDO;  Service: Endoscopy;  Laterality: N/A;    FINGER SURGERY Left     index fracture repair    GALLBLADDER SURGERY  08/2021    KNEE ARTHROSCOPY      SHOULDER ARTHROSCOPY      SHOULDER SURGERY      TREATMENT OF CARDIAC ARRHYTHMIA  11/21/2022    Procedure: Cardioversion or Defibrillation;  Surgeon: Omero Low MD;  Location: [facility] EP LAB;  Service: Cardiology;;    TRICUSPID VALVULOPLASTY N/A 08/10/2022    Procedure: REPAIR, TRICUSPID VALVE;  Surgeon: Manish Morris MD;  Location: [facility] OR [address] [place] [address];  Service: Cardiothoracic;  Laterality: N/A;     Family History   Problem Relation Age of Onset    Stroke Mother      Social History     Tobacco Use    Smoking status: Former     Current packs/day: 0.00     Types: Cigarettes    Smokeless tobacco: Never   Substance Use Topics    Alcohol use: No    Drug use: No     Review of Systems   Constitutional:  Negative for chills and fever.   HENT:  Positive for hearing loss (muffled hearing bilaterally). Negative for congestion, ear discharge, ear pain, rhinorrhea, sore throat and tinnitus.    Respiratory:  Negative for cough and shortness of breath.    Cardiovascular:  Negative for chest pain.   Gastrointestinal:  Negative for abdominal pain, constipation, diarrhea, nausea and vomiting.   Genitourinary:  Negative for dysuria, frequency, urgency, vaginal  bleeding and vaginal discharge.   Musculoskeletal:  Negative for back pain.   Skin:  Negative for rash.   Neurological:  Positive for dizziness. Negative for syncope, facial asymmetry, speech difficulty, weakness, light-headedness, numbness and headaches.   Psychiatric/Behavioral:  Negative for confusion.        Physical Exam     Initial Vitals [08/13/23 1656]   BP Pulse Resp Temp SpO2   (!) 197/80 (!) 51 20 98.2 °F (36.8 °C) 100 %      MAP       --         Physical Exam    Nursing note and vitals reviewed.  Constitutional: She appears well-developed and well-nourished. No distress.   HENT:   Head: Normocephalic and atraumatic.   Right Ear: External ear normal.   Left Ear: External ear normal.   Nose: Nose normal.   Mouth/Throat: Oropharynx is clear and moist.   Cerumen impaction bilaterally.    Eyes: Conjunctivae and EOM are normal. Pupils are equal, round, and reactive to light. Right eye exhibits no discharge. Left eye exhibits no discharge.   No nystagmus.   Neck: Neck supple.   Normal range of motion.  Cardiovascular:  Regular rhythm, normal heart sounds and intact distal pulses.   Bradycardia present.         Pulmonary/Chest: Breath sounds normal. No respiratory distress. She has no wheezes. She has no rhonchi. She has no rales.   Abdominal: Abdomen is soft. Bowel sounds are normal. She exhibits no distension. There is no abdominal tenderness. There is no rebound and no guarding.   Musculoskeletal:         General: No edema. Normal range of motion.      Cervical back: Normal range of motion and neck supple.     Neurological: She is alert and oriented to person, place, and time. She has normal strength. No cranial nerve deficit or sensory deficit. GCS score is 15. GCS eye subscore is 4. GCS verbal subscore is 5. GCS motor subscore is 6.   No focal neurological deficits.    Skin: Skin is warm and dry. No pallor.   Psychiatric: She has a normal mood and affect. Her behavior is normal. Judgment and thought content  normal.       ED Course   Procedures    Labs Reviewed   CBC W/ AUTO DIFFERENTIAL - Abnormal; Notable for the following components:       Result Value    Hemoglobin 11.2 (*)     Hematocrit 35.8 (*)     MCV 75 (*)     MCH 23.5 (*)     MCHC 31.3 (*)     RDW 15.0 (*)     All other components within normal limits   COMPREHENSIVE METABOLIC PANEL - Abnormal; Notable for the following components:    Potassium 2.6 (*)     CO2 30 (*)     Albumin 2.9 (*)     ALT 9 (*)     All other components within normal limits    Narrative:     K critical result(s) called and verbal readback obtained from Nhung Mathews RN. by [facility]/13/2023 18:29   URINALYSIS, REFLEX TO URINE CULTURE - Abnormal; Notable for the following components:    Urobilinogen, UA 2.0-3.0 (*)     All other components within normal limits    Narrative:     Specimen Source->Urine   BASIC METABOLIC PANEL - Abnormal; Notable for the following components:    Potassium 2.8 (*)     Calcium 8.2 (*)     All other components within normal limits   POCT GLUCOSE - Abnormal; Notable for the following components:    POCT Glucose 112 (*)     All other components within normal limits   ISTAT PROCEDURE - Abnormal; Notable for the following components:    POC Potassium 2.7 (*)     All other components within normal limits   TROPONIN I   TSH   MAGNESIUM   MAGNESIUM   POTASSIUM   POCT GLUCOSE MONITORING CONTINUOUS        ECG Results              EKG 12-lead (Final result)  Result time 08/14/23 08:30:33      Final result by Interface, Lab In [facility] (08/14/23 08:30:33)                   Narrative:    Test Reason : R42,    Vent. Rate : 044 BPM     Atrial Rate : 044 BPM     P-R Int : 198 ms          QRS Dur : 078 ms      QT Int : 524 ms       P-R-T Axes : 113 009 043 degrees     QTc Int : 448 ms    Marked sinus bradycardia  Nonspecific ST and T wave abnormality  Abnormal ECG    Confirmed by Tiara Hernandez MD ([id number]) on 8/14/2023 8:30:23 AM    Referred By: SAL   SELF            Confirmed By:Tiara Hernandez MD                                     EKG 12-lead (Final result)  Result time 08/14/23 08:30:25      Final result by Interface, Lab In [facility] (08/14/23 08:30:25)                   Narrative:    Test Reason : R42,    Vent. Rate : 050 BPM     Atrial Rate : 050 BPM     P-R Int : 178 ms          QRS Dur : 098 ms      QT Int : 494 ms       P-R-T Axes : 093 -02 041 degrees     QTc Int : 450 ms    Sinus bradycardia  Nonspecific T wave abnormality  Abnormal ECG    Confirmed by Taira Hernandez MD ([id number]) on 8/14/2023 8:30:18 AM    Referred By: [email or web address]   SELF           Confirmed By:Tiara Hernandez MD                                  Imaging Results              CT Head Without Contrast (Final result)  Result time 08/13/23 19:06:30      Final result by Galilea Akins MD (08/13/23 19:06:30)                   Impression:      No acute intracranial abnormalities identified.      Electronically signed by: Galilea Akins MD  Date:    08/13/2023  Time:    19:06               Narrative:    EXAMINATION:  CT HEAD WITHOUT CONTRAST    CLINICAL HISTORY:  Dizziness, persistent/recurrent, cardiac or vascular cause suspected;    TECHNIQUE:  Low dose axial images were obtained through the head.  Coronal and sagittal reformations were also performed. Contrast was not administered.    COMPARISON:  None.    FINDINGS:  No evidence of acute/recent major vascular distribution cerebral infarction, intraparenchymal hemorrhage, or intra-axial space occupying lesion. The ventricular system is normal in size and configuration with no evidence of hydrocephalus. No effacement of the skull-base cisterns. No abnormal extra-axial fluid collections or blood products. Visualized paranasal sinuses and mastoid air cells are clear. The calvarium shows no significant abnormality.                                       X-Ray Chest 1 View (Final result)  Result time 08/13/23 18:51:40      Final result by Galilea Akins MD  (08/13/23 18:51:40)                   Impression:      No acute cardiopulmonary process identified.      Electronically signed by: Galilea Akins MD  Date:    08/13/2023  Time:    18:51               Narrative:    EXAMINATION:  XR CHEST 1 VIEW    CLINICAL HISTORY:  Dizziness and giddiness    TECHNIQUE:  Single frontal view of the chest was performed.    COMPARISON:  September 2022.    FINDINGS:  Cardiac silhouette is stable in size.  Postsurgical sternotomy changes and cardiac valve replacement are seen.  Lungs are symmetrically expanded.  No evidence of focal consolidative process, pneumothorax, or significant pleural effusion.  No acute osseous abnormality identified.                                       Medications   sodium chloride 0.9% flush 10 mL (has no administration in time range)   melatonin tablet 6 mg (has no administration in time range)   0.9 % NaCl with KCl 20 mEq infusion ( Intravenous New Bag 8/14/23 0959)   acetaminophen tablet 650 mg (has no administration in time range)   ketorolac injection 15 mg (has no administration in time range)   ondansetron injection 4 mg (has no administration in time range)   meclizine tablet 25 mg (has no administration in time range)   potassium chloride SA CR tablet 40 mEq (40 mEq Oral Given 8/13/23 1907)   famotidine (PF) injection 20 mg (20 mg Intravenous Given 8/13/23 2054)   potassium chloride 10 mEq in 100 mL IVPB (0 mEq Intravenous Stopped 8/13/23 2222)   meclizine tablet 25 mg (25 mg Oral Given 8/13/23 2055)   sodium chloride 0.9% bolus 1,000 mL 1,000 mL (0 mLs Intravenous Stopped 8/13/23 2155)   potassium bicarbonate disintegrating tablet 40 mEq (40 mEq Oral Given 8/14/23 0657)   magnesium oxide tablet 400 mg ( Oral Canceled Entry 8/14/23 0800)   potassium chloride SA CR tablet 40 mEq (40 mEq Oral Given 8/14/23 1122)     Medical Decision Making:   Initial Assessment:   Urgent evaluation of 62-year-old female presents with intermittent dizziness with  positional changes and muffled hearing in bilateral ears.  No history of vertigo.  No recent head trauma loss of consciousness. Likely experiencing positional vertigo.  She is well-appearing and nontoxic.  She was bradycardic on exam.  Patient states that she has lived with bradycardia for a long time.  Plan for labs.  Awaiting results of chest x-ray and CT head ordered by the triage provider.  Clinical Tests:   Lab Tests: Ordered and Reviewed  Radiological Study: Ordered and Reviewed  ED Management:  No acute process on chest x-ray.  No acute intracranial abnormality on CT head.  On review of labs, no leukocytosis.  H&H is stable at her baseline.  Troponin and TSH are within normal limits.  On review of CMP, significant hypokalemia at 2.6 today.  I updated the patient on results.  Currently asymptomatic.  On further discussions about her potassium, patient states that she has been prescribed oral potassium but does not take it on a daily basis.  I stressed the importance of taking oral potassium to maintain a healthy heart.  She does have EKG changes today consisting of U waves.  Will treat hypokalemia with oral potassium today.  Case discussed with supervising physician who recommends admission to EDOU for further management of hypokalemia and dizziness.  On repeat EKG, U waves have resolved.  Case discussed with ED[facility] provider, Valentina Quarles PA-C who will take over patient care.                           Clinical Impression:   Final diagnoses:  [R42] Lightheadedness  [R42] Dizziness  [E87.6] Hypokalemia (Primary)        ED Disposition Condition    Observation Stable                Preston Pineda PA-C  08/14/23 1633       Preston Pineda PA-C  08/14/23 1634       Preston Pineda PA-C  08/14/23 1634       Preston Pineda PA-C  08/14/23 2231

## 2023-08-14 NOTE — DISCHARGE SUMMARY
[facility] ED Observation Unit  Discharge Summary        History of Present Illness:    Magalys Bass is a 62 y.o. female with history of HTN, HLD, paroxysmal A Fib, mitral valve regurgitation, and bilateral carotid artery stenosis presenting to the emergency department for evaluation of intermittent, positional dizziness for the past 5 days. She reports room spinning sensation whenever she moves her head too quickly, moves from a seated to standing position too quickly and with ambulation. She denies history of vertigo. She denies head trauma or LOC. Reports associated muffled hearing in bilateral ears.  She denies ear pain, ear drainage, or tinnitus.  She denies fever, chills, headaches, weakness, paresthesias, shortness of breath, chest pain, abdominal pain, nausea, vomiting, diarrhea, constipation, urinary symptoms, vaginal bleeding, vaginal discharge.     ED Course:  - CT negative  - resolution of dizziness with meclizine  - CMP showed hypokalemia of 2.6.  Known chronic hypokalemia, baseline approximately 3.0-3.1.  Supposed to be taking daily oral potassium, however, has been noncompliant.  - EKG with some changes from previous.  Presence of U-waves.  No complaints of chest pain or shortness of breath.  - received p.o. and IV potassium  - repeat EKG improved, resolution of U-waves  - repeat potassium 2.7  - admitted to EDOU for further potassium replacement and recheck labs in am        Observation Course:    A.m. potassium not significantly improved.  She was given an additional oral dose and continued on IV hydration with potassium.  Additional potassium revealed to be 3.4.  Patient also reported complete resolution of her dizziness.  Bradycardic throughout the day, patient reports that this is common for her and she is a known history of bradycardia.  With ambulation heart rate in the 60s.  She was able ambulate around the emergency department without difficulty with steady gait.  She denies any  dizziness or lightheadedness.    Brief Physical Exam/Reassessment   Review of Systems   Constitutional:  Negative for chills and fever.   HENT:  Negative for hearing loss.    Respiratory:  Negative for cough, shortness of breath and wheezing.    Cardiovascular:  Negative for chest pain, palpitations and claudication.   Gastrointestinal:  Negative for abdominal pain, nausea and vomiting.   Neurological:  Negative for dizziness, weakness and headaches.   All other systems reviewed and are negative.      Physical Exam    Nursing note and vitals reviewed.  Constitutional: She appears well-developed and well-nourished. She does not appear ill. No distress.   HENT:   Head: Normocephalic and atraumatic.   Neck: Neck supple.   Cardiovascular:  Regular rhythm, S1 normal, S2 normal and normal heart sounds.   Bradycardia present.         Pulmonary/Chest: Effort normal and breath sounds normal. No tachypnea. She has no decreased breath sounds. She has no wheezes.   Musculoskeletal:      Cervical back: Neck supple.     Neurological: She is alert and oriented to person, place, and time. GCS eye subscore is 4. GCS verbal subscore is 5. GCS motor subscore is 6.   Skin: Skin is warm, dry and intact.   Psychiatric: She has a normal mood and affect. Her behavior is normal.           ED/OBS Workup:  Vitals:    08/14/23 1719 08/14/23 1721 08/14/23 1724 08/14/23 1812   BP: (!) 117/46 (!) 149/65 (!) 175/69    Pulse: (!) 42 (!) 47 (!) 44 63   Resp:   16    Temp:   97.6 °F (36.4 °C)    TempSrc:   Oral    SpO2:   100%    Weight:       Height:        08/14/23 1821   BP: (!) 175/69   Pulse: 63   Resp: 16   Temp: 97.6 °F (36.4 °C)   TempSrc: Oral   SpO2: 100%   Weight:    Height:        Labs Reviewed   CBC W/ AUTO DIFFERENTIAL - Abnormal; Notable for the following components:       Result Value    Hemoglobin 11.2 (*)     Hematocrit 35.8 (*)     MCV 75 (*)     MCH 23.5 (*)     MCHC 31.3 (*)     RDW 15.0 (*)     All other components within normal  limits   COMPREHENSIVE METABOLIC PANEL - Abnormal; Notable for the following components:    Potassium 2.6 (*)     CO2 30 (*)     Albumin 2.9 (*)     ALT 9 (*)     All other components within normal limits    Narrative:     K critical result(s) called and verbal readback obtained from Nhung Mathews RN. by [facility] 08/13/2023 18:29   URINALYSIS, REFLEX TO URINE CULTURE - Abnormal; Notable for the following components:    Urobilinogen, UA 2.0-3.0 (*)     All other components within normal limits    Narrative:     Specimen Source->Urine   BASIC METABOLIC PANEL - Abnormal; Notable for the following components:    Potassium 2.8 (*)     Calcium 8.2 (*)     All other components within normal limits   POTASSIUM - Abnormal; Notable for the following components:    Potassium 3.4 (*)     All other components within normal limits   POCT GLUCOSE - Abnormal; Notable for the following components:    POCT Glucose 112 (*)     All other components within normal limits   ISTAT PROCEDURE - Abnormal; Notable for the following components:    POC Potassium 2.7 (*)     All other components within normal limits   TROPONIN I   TSH   MAGNESIUM       CT Head Without Contrast   Final Result      No acute intracranial abnormalities identified.         Electronically signed by: Galilea Akins MD   Date:    08/13/2023   Time:    19:06      X-Ray Chest 1 View   Final Result      No acute cardiopulmonary process identified.         Electronically signed by: Galilea Akins MD   Date:    08/13/2023   Time:    18:51          Final Diagnosis:  1. Hypokalemia    2. Lightheadedness    3. Dizziness    4. Vertigo    5. Bradycardia        Plan:  Discharge home with meclizine.  Discussed needs close follow-up with her primary care and cardiologist.  I did discuss if her symptoms return including return of dizziness, lightheadedness, chest pain or shortness of breath she may return to emergency department for re-evaluation.  She stated  understanding.    Discharge Condition: Good    Disposition: Home or Self Care     Time spent on the discharge of the patient including review of hospital course with the patient. reviewing discharge medications and arranging follow-up care 35 minutes.  Patient was seen and examined on the date of discharge and determined to be suitable for discharge.    Follow Up:   ED Disposition Condition    Discharge Stable            ED Prescriptions       Medication Sig Dispense Start Date End Date Auth. Provider    meclizine (ANTIVERT) 25 mg tablet Take 1 tablet (25 mg total) by mouth 3 (three) times daily as needed for Dizziness. 30 tablet 8/14/2023 8/24/2023 Linda Rasmussen, DON          Follow-up Information       Follow up With Specialties Details Why Contact Info    Yonathan rBewer MD Internal Medicine Schedule an appointment as soon as possible for a visit in 3 days  [address]   [address]  [facility] [id number] [phone]      [facility] Emergency Dept Emergency Medicine Go to  If symptoms worsen [address] [facility] [address] [phone]            No future appointments.

## 2024-04-25 ENCOUNTER — HOSPITAL ENCOUNTER (EMERGENCY)
Facility: OTHER | Age: 64
Discharge: HOME | End: 2024-04-25
Attending: EMERGENCY MEDICINE
Payer: MEDICARE

## 2024-04-25 VITALS
RESPIRATION RATE: 18 BRPM | SYSTOLIC BLOOD PRESSURE: 155 MMHG | HEIGHT: 65 IN | WEIGHT: 207 LBS | OXYGEN SATURATION: 99 % | BODY MASS INDEX: 34.49 KG/M2 | TEMPERATURE: 98 F | HEART RATE: 56 BPM | DIASTOLIC BLOOD PRESSURE: 64 MMHG

## 2024-04-25 DIAGNOSIS — E87.6 HYPOKALEMIA: Primary | ICD-10-CM

## 2024-04-25 DIAGNOSIS — R42 LIGHTHEADEDNESS: ICD-10-CM

## 2024-04-25 DIAGNOSIS — J30.2 SEASONAL ALLERGIES: ICD-10-CM

## 2024-04-25 LAB
ALBUMIN SERPL BCP-MCNC: 3.1 G/DL (ref 3.5–5.2)
ALP SERPL-CCNC: 113 U/L (ref 55–135)
ALT SERPL W/O P-5'-P-CCNC: 16 U/L (ref 10–44)
ANION GAP SERPL CALC-SCNC: 12 MMOL/L (ref 8–16)
AST SERPL-CCNC: 24 U/L (ref 10–40)
BASOPHILS # BLD AUTO: 0.03 K/UL (ref 0–0.2)
BASOPHILS NFR BLD: 0.8 % (ref 0–1.9)
BILIRUB SERPL-MCNC: 0.5 MG/DL (ref 0.1–1)
BILIRUB UR QL STRIP: NEGATIVE
BUN SERPL-MCNC: 20 MG/DL (ref 8–23)
CALCIUM SERPL-MCNC: 8.6 MG/DL (ref 8.7–10.5)
CHLORIDE SERPL-SCNC: 101 MMOL/L (ref 95–110)
CLARITY UR: CLEAR
CO2 SERPL-SCNC: 28 MMOL/L (ref 23–29)
COLOR UR: YELLOW
CREAT SERPL-MCNC: 1.4 MG/DL (ref 0.5–1.4)
DIFFERENTIAL METHOD BLD: ABNORMAL
EOSINOPHIL # BLD AUTO: 0.1 K/UL (ref 0–0.5)
EOSINOPHIL NFR BLD: 2.7 % (ref 0–8)
ERYTHROCYTE [DISTWIDTH] IN BLOOD BY AUTOMATED COUNT: 14.6 % (ref 11.5–14.5)
EST. GFR  (NO RACE VARIABLE): 42 ML/MIN/1.73 M^2
GLUCOSE SERPL-MCNC: 80 MG/DL (ref 70–110)
GLUCOSE UR QL STRIP: NEGATIVE
HCT VFR BLD AUTO: 34.7 % (ref 37–48.5)
HGB BLD-MCNC: 10.8 G/DL (ref 12–16)
HGB UR QL STRIP: NEGATIVE
IMM GRANULOCYTES # BLD AUTO: 0 K/UL (ref 0–0.04)
IMM GRANULOCYTES NFR BLD AUTO: 0 % (ref 0–0.5)
KETONES UR QL STRIP: NEGATIVE
LEUKOCYTE ESTERASE UR QL STRIP: NEGATIVE
LYMPHOCYTES # BLD AUTO: 1.2 K/UL (ref 1–4.8)
LYMPHOCYTES NFR BLD: 32.2 % (ref 18–48)
MCH RBC QN AUTO: 23.6 PG (ref 27–31)
MCHC RBC AUTO-ENTMCNC: 31.1 G/DL (ref 32–36)
MCV RBC AUTO: 76 FL (ref 82–98)
MONOCYTES # BLD AUTO: 0.4 K/UL (ref 0.3–1)
MONOCYTES NFR BLD: 11.7 % (ref 4–15)
NEUTROPHILS # BLD AUTO: 1.9 K/UL (ref 1.8–7.7)
NEUTROPHILS NFR BLD: 52.6 % (ref 38–73)
NITRITE UR QL STRIP: NEGATIVE
NRBC BLD-RTO: 0 /100 WBC
PH UR STRIP: 5 [PH] (ref 5–8)
PLATELET # BLD AUTO: 176 K/UL (ref 150–450)
PMV BLD AUTO: 11.9 FL (ref 9.2–12.9)
POTASSIUM SERPL-SCNC: 2.9 MMOL/L (ref 3.5–5.1)
PROT SERPL-MCNC: 6.6 G/DL (ref 6–8.4)
PROT UR QL STRIP: NEGATIVE
RBC # BLD AUTO: 4.57 M/UL (ref 4–5.4)
SODIUM SERPL-SCNC: 141 MMOL/L (ref 136–145)
SP GR UR STRIP: 1.01 (ref 1–1.03)
URN SPEC COLLECT METH UR: NORMAL
UROBILINOGEN UR STRIP-ACNC: NEGATIVE EU/DL
WBC # BLD AUTO: 3.66 K/UL (ref 3.9–12.7)

## 2024-04-25 PROCEDURE — 99285 EMERGENCY DEPT VISIT HI MDM: CPT | Mod: 25

## 2024-04-25 PROCEDURE — 85025 COMPLETE CBC W/AUTO DIFF WBC: CPT | Performed by: NURSE PRACTITIONER

## 2024-04-25 PROCEDURE — 25000003 PHARM REV CODE 250: Performed by: EMERGENCY MEDICINE

## 2024-04-25 PROCEDURE — 80053 COMPREHEN METABOLIC PANEL: CPT | Performed by: NURSE PRACTITIONER

## 2024-04-25 PROCEDURE — 93005 ELECTROCARDIOGRAM TRACING: CPT

## 2024-04-25 PROCEDURE — 96360 HYDRATION IV INFUSION INIT: CPT

## 2024-04-25 PROCEDURE — 81003 URINALYSIS AUTO W/O SCOPE: CPT | Performed by: NURSE PRACTITIONER

## 2024-04-25 PROCEDURE — 93010 ELECTROCARDIOGRAM REPORT: CPT | Mod: ,,, | Performed by: INTERNAL MEDICINE

## 2024-04-25 RX ADMIN — POTASSIUM BICARBONATE 50 MEQ: 978 TABLET, EFFERVESCENT ORAL at 06:04

## 2024-04-25 RX ADMIN — SODIUM CHLORIDE 1000 ML: 9 INJECTION, SOLUTION INTRAVENOUS at 07:04

## 2024-04-25 NOTE — FIRST PROVIDER EVALUATION
Emergency Department TeleTriage Encounter Note      CHIEF COMPLAINT    Chief Complaint   Patient presents with    Dizziness     Pt presents to the ER with complaints of sudden onset of lightheadedness that started approximately one hour ago. Pt is scheduled for a pacemaker on 5/2.         VITAL SIGNS   Initial Vitals [04/25/24 1515]   BP Pulse Resp Temp SpO2   (!) 154/67 (!) 57 16 98.1 °F (36.7 °C) 99 %      MAP       --            ALLERGIES    Review of patient's allergies indicates:   Allergen Reactions    Morphine Itching       PROVIDER TRIAGE NOTE  63-year-old female presents to the ER with complaints of new onset lightheadedness today about 1 hour prior to arrival while standing still.  She states she felt like she was going to tap never actually lost consciousness.  No fall or head injury.  She denies any dizziness.  No gait changes no slurred speech no confusion no vision changes.  Denies any unilateral extremity weakness.  She also denies any chest pain or shortness of breath associated.  Scheduled to have a pacemaker on May 2nd.  She states since onset her symptoms have fully resolved she denies symptoms at this time other than just not completely feeling like herself but unable to further elaborate on this.       AAOx3, respirations even and non- labored, stable vitals, normal coloration of skin, sitting upright in triage chair, appears in no acute distress.          ORDERS  Labs Reviewed - No data to display    ED Orders (720h ago, onward)      None              Virtual Visit Note: The provider triage portion of this emergency department evaluation and documentation was performed via [employer]FIMET, a HIPAA-compliant telemedicine application, in concert with a tele-presenter in the room. A face to face patient evaluation with one of my colleagues will occur once the patient is placed in an emergency department room.      DISCLAIMER: This note was prepared with M*Modal voice recognition transcription  software. Garbled syntax, mangled pronouns, and other bizarre constructions may be attributed to that software system.

## 2024-04-25 NOTE — ED PROVIDER NOTES
Chief Complaint   Dizziness (Pt presents to the ER with complaints of sudden onset of lightheadedness that started approximately one hour ago. Pt is scheduled for a pacemaker on 5/2./)      History Of Present Illness   Magalys Bass is a 63 y.o. female presenting with lightheadedness that started while driving today.  It lasted for a few seconds at maximal intensity while she pulled over.  It improved significantly but still slightly present.  She came here for evaluation.  She is scheduled have a pacemaker early next month.  Denies chest pain or shortness of breath.        Review of patient's allergies indicates:   Allergen Reactions    Morphine Itching       Current Facility-Administered Medications on File Prior to Encounter   Medication Dose Route Frequency Provider Last Rate Last Admin    0.9%  NaCl infusion   Intravenous Continuous Isauro Perdomo MD        0.9%  NaCl infusion   Intravenous Continuous Parth Steinberg MD        0.9%  NaCl infusion   Intravenous Continuous Fredy Alegre MD        LIDOcaine (PF) 10 mg/ml (1%) injection 10 mg  1 mL Intradermal Once PRN Parth Steinberg MD         Current Outpatient Medications on File Prior to Encounter   Medication Sig Dispense Refill    amLODIPine (NORVASC) 2.5 MG tablet Take 1 tablet (2.5 mg total) by mouth once daily. 30 tablet 11    diclofenac sodium (VOLTAREN) 1 % Gel Apply topically.      docusate sodium (COLACE) 100 MG capsule Take 1 capsule (100 mg total) by mouth 2 (two) times daily as needed for Constipation. 30 capsule 0    ELIQUIS 5 mg Tab TAKE 1 TABLET(5 MG) BY MOUTH TWICE DAILY 30 tablet 12    ergocalciferol (ERGOCALCIFEROL) 50,000 unit Cap Take 50,000 Units by mouth every 7 days.      ferrous gluconate (FERGON) 324 MG tablet Take 324 mg by mouth every other day.      fluticasone propionate (FLONASE) 50 mcg/actuation nasal spray 1 spray by Each Nostril route once daily.      furosemide (LASIX) 20 MG tablet Take 20 mg  by mouth daily as needed.      hydroCHLOROthiazide (HYDRODIURIL) 25 MG tablet Take 25 mg by mouth once daily.      KLOR-CON 20 mEq Pack Take 20 mEq by mouth.      meclizine (ANTIVERT) 25 mg tablet Take 1 tablet (25 mg total) by mouth 3 (three) times daily as needed for Dizziness. 30 tablet 0    meloxicam (MOBIC) 7.5 MG tablet Take 7.5 mg by mouth.      omeprazole (PRILOSEC) 40 MG capsule Take 40 mg by mouth once daily.      pantoprazole (PROTONIX) 40 MG tablet Take 1 tablet (40 mg total) by mouth once daily. 30 tablet 0    [DISCONTINUED] benazepril (LOTENSIN) 40 MG tablet Take 40 mg by mouth every evening.       [DISCONTINUED] metoprolol succinate (TOPROL-XL) 25 MG 24 hr tablet Take 25 mg by mouth once daily.         Past History   As per HPI and below:  Past Medical History:   Diagnosis Date    Anticoagulant long-term use     Arthritis     Bilateral carotid artery stenosis     Chronic diastolic heart failure     Depression     GERD (gastroesophageal reflux disease)     History of maze procedure     HLD (hyperlipidemia)     Hypertension     Medical history non-contributory     Nonrheumatic mitral valve regurgitation     Paroxysmal A-fib      Past Surgical History:   Procedure Laterality Date    ABLATION OF ARRHYTHMOGENIC FOCUS FOR ATRIAL FIBRILLATION N/A 11/21/2022    Procedure: Ablation atrial fibrillation;  Surgeon: Omero Low MD;  Location: [facility] EP LAB;  Service: Cardiology;  Laterality: N/A;  afib, PVI, RFA, CASTILLO (cx if SR), GENIE, anes, MB, 3 Prep    COLONOSCOPY N/A 05/10/2023    Procedure: COLONOSCOPY;  Surgeon: Fredy Alegre MD;  Location: [facility] ENDO;  Service: Endoscopy;  Laterality: N/A;    CORONARY ANGIOGRAPHY N/A 04/13/2022    Procedure: ANGIOGRAM, CORONARY ARTERY;  Surgeon: Isauro Perdomo MD;  Location: [facility] CATH LAB;  Service: Cardiology;  Laterality: N/A;    KIRKPATRICK MAZE PROCEDURE N/A 08/10/2022    Procedure: KIRKPATRICK MAZE PROCEDURE;  Surgeon: Manish Morris MD;  Location: [facility] OR [facility] FLR;   Service: Cardiothoracic;  Laterality: N/A;  Maze    EGD, WITH CLOSED BIOPSY  06/05/2023    ESOPHAGOGASTRODUODENOSCOPY N/A 6/5/2023    Procedure: EGD (ESOPHAGOGASTRODUODENOSCOPY);  Surgeon: Fredy Alegre MD;  Location: [facility] ENDO;  Service: Endoscopy;  Laterality: N/A;    FINGER SURGERY Left     index fracture repair    GALLBLADDER SURGERY  08/2021    KNEE ARTHROSCOPY      SHOULDER ARTHROSCOPY      SHOULDER SURGERY      TREATMENT OF CARDIAC ARRHYTHMIA  11/21/2022    Procedure: Cardioversion or Defibrillation;  Surgeon: Omero Low MD;  Location: [facility] EP LAB;  Service: Cardiology;;    TRICUSPID VALVULOPLASTY N/A 08/10/2022    Procedure: REPAIR, TRICUSPID VALVE;  Surgeon: Manish Morris MD;  Location: [facility] OR [address] [place] [address];  Service: Cardiothoracic;  Laterality: N/A;       Social History     Tobacco Use    Smoking status: Former     Types: Cigarettes    Smokeless tobacco: Never   Substance Use Topics    Alcohol use: No    Drug use: No       Family History   Problem Relation Name Age of Onset    Stroke Mother         Physical Exam     Vitals:    04/25/24 1915 04/25/24 1943 04/25/24 1946 04/25/24 1948   BP: 133/60 130/61 (!) 144/65 (!) 155/64   BP Location: Right arm Right arm Right arm Right arm   Patient Position: Lying Lying Sitting Standing   Pulse:  (!) 52 (!) 52 (!) 56   Resp:  18 18 18   Temp:       TempSrc:       SpO2:  99% 99% 99%   Weight:       Height:         Appearance: No acute distress.  Skin: No rashes seen.  Good turgor.  No abrasions.  No ecchymoses.  Eyes: No conjunctival injection.  ENT: Oropharynx clear.    Chest: Clear to auscultation bilaterally.  Good air movement.  No wheezes.  No rhonchi.  Cardiovascular: Regular rate and rhythm.  No murmurs. No gallops. No rubs.  Abdomen: Soft.  Not distended.  Nontender.  No guarding.  No rebound.  Musculoskeletal: Good range of motion all joints.  No deformities.  Neck supple.  No meningismus.  Neurologic: Motor intact.  Sensation intact.  Cerebellar  intact.  Cranial nerves intact.  Mental Status:  Alert and oriented x 3.  Appropriate, conversant.      Initial MDM   Brief lightheadedness at maximum intensity followed by very mild lightheadedness since.  EKG shows a rate of 60 and is otherwise normal.  No chest pain or shortness of breath.  Symptoms have persisted slightly despite normal heart rate and blood pressure.  Doubt dysrhythmia as the cause.  Electrolytes slightly abnormal on labs sent from triage.  Will replace potassium, give fluids and reassess.      Medications Given     Medications   potassium bicarbonate disintegrating tablet 50 mEq (50 mEq Oral Given 4/25/24 1842)   sodium chloride 0.9% bolus 1,000 mL 1,000 mL (1,000 mLs Intravenous New Bag 4/25/24 1922)       Results and Course     Labs Reviewed   CBC W/ AUTO DIFFERENTIAL - Abnormal; Notable for the following components:       Result Value    WBC 3.66 (*)     Hemoglobin 10.8 (*)     Hematocrit 34.7 (*)     MCV 76 (*)     MCH 23.6 (*)     MCHC 31.1 (*)     RDW 14.6 (*)     All other components within normal limits   COMPREHENSIVE METABOLIC PANEL - Abnormal; Notable for the following components:    Potassium 2.9 (*)     Calcium 8.6 (*)     Albumin 3.1 (*)     eGFR 42 (*)     All other components within normal limits   URINALYSIS, REFLEX TO URINE CULTURE    Narrative:     Specimen Source->Urine       Imaging Results              X-Ray Chest 1 View (Final result)  Result time 04/25/24 16:24:21      Final result by Enzo Nichols MD (04/25/24 16:24:21)                   Impression:      No acute cardiopulmonary disease and no significant interval change      Electronically signed by: Enzo Nichols MD  Date:    04/25/2024  Time:    16:24               Narrative:    EXAMINATION:  XR CHEST 1 VIEW    CLINICAL HISTORY:  Dizziness and giddiness    TECHNIQUE:  Single frontal view of the chest was performed.    COMPARISON:  08/13/2023 and outside chest with lateral dated 09/11/2023 report  only    FINDINGS:  Postoperative changes are again identified in the thorax compatible with history of mitral valvuloplasty.  Cardiac silhouette appears mildly enlarged.  Pulmonary vascularity does not appear congested.  Lungs are satisfactorily expanded and appear free of active disease.  No pleural fluid or pneumothorax.  Degenerative changes involving the thoracic spine.                                      ED Course as of 04/26/24 2140   Thu Apr 25, 2024   1825 WBC(!): 3.66 [DC]   1825 Hemoglobin(!): 10.8 [DC]   1825 Platelet Count: 176 [DC]   1825 Creatinine: 1.4 [DC]   1825 Potassium(!): 2.9 [DC]   1825 X-Ray Chest 1 View  CXR shows no acute disease per my independent interpretation.     [DC]   1825 EKG 12-lead  NSR, rate 60, PAC's, no ischemia per my independent interpretation.     [DC]   1853 Urinalysis, Reflex to Urine Culture Urine, Clean Catch  negative [DC]   1950 Briefly this is a 63-year-old female with history as above who presents the ED with chief complaint of lightheadedness while driving.  She was scheduled for pacemaker.  She was signed out to me pending reassessment after IV fluids and potassium replacement with anticipated discharge home.  On my assessment patient notes cessation of her lightheadedness.  She has had oral potassium replacement.  Will test gait.  No Dillan arrhythmias noted on monitoring.  Had shared decision-making with patient about admission for observation versus discharge home.  At this point in time she feels comfortable going home with her  and following with her cardiologist and PCP in the outpatient setting.  She did note some symptoms of seasonal allergies with rhinorrhea and cerumen her bilateral ears.  She was instructed to trial Zyrtec and Debrox.  Gait stable.  Care plan addressed with patient and all those present. All questions answered.  Strict return precautions discussed.  Patient was instructed on the correct follow-up time and route.  They voiced verbal  understanding and agreement  with the plan and were deemed stable for discharge.  [HM]      ED Course User Index  [DC] William Leach MD  [HM] Floridalma Charles MD               MDM, Impression and Plan   63 y.o. female with lightheadedness, improved after fluids/electrolyte replacement.  F/U PCP.  Doubt arrhythmia given persistent of mild symptoms on arrival with HR 60.           Final diagnoses:  [R42] Lightheadedness  [E87.6] Hypokalemia (Primary)  [J30.2] Seasonal allergies        ED Disposition Condition    Discharge Stable          ED Prescriptions    None       Follow-up Information       Follow up With Specialties Details Why Contact Info    Yonathan Brewer MD Internal Medicine In 2 days  [address]   [address]  [facility] [id number] [phone]      [facility] - Emergency Dept Emergency Medicine  If symptoms worsen [address] [facility] [address] [phone]    Please follow up with your cardiologist and informed him of your ED visit tomorrow  Call today  Mauricio Moura MD   [facility], [address]   [place] [address]   [phone] (Work)   [phone] (Fax)               William Leach MD  04/26/24 9590

## 2024-04-25 NOTE — ED TRIAGE NOTES
Transient episode of lightheadedness while driving today. States lasted a few seconds and has now improved but still feels slightly dizzy. No recent illness reported but does acknowledge that she has had allergic sinus congestion the last few days. States she is scheduled to have PPM for transient bradycardia in May. Presents awake, alert. Denies N/V and reports appetite unchanged. No distress noted.

## 2024-04-26 LAB
OHS QRS DURATION: 96 MS
OHS QTC CALCULATION: 440 MS

## 2024-04-26 NOTE — DISCHARGE INSTRUCTIONS
Diagnosis:   1. Hypokalemia    2. Lightheadedness    3. Seasonal allergies        Tests you had showed:   Labs Reviewed   CBC W/ AUTO DIFFERENTIAL - Abnormal; Notable for the following components:       Result Value    WBC 3.66 (*)     Hemoglobin 10.8 (*)     Hematocrit 34.7 (*)     MCV 76 (*)     MCH 23.6 (*)     MCHC 31.1 (*)     RDW 14.6 (*)     All other components within normal limits   COMPREHENSIVE METABOLIC PANEL - Abnormal; Notable for the following components:    Potassium 2.9 (*)     Calcium 8.6 (*)     Albumin 3.1 (*)     eGFR 42 (*)     All other components within normal limits   URINALYSIS, REFLEX TO URINE CULTURE    Narrative:     Specimen Source->Urine   POCT GLUCOSE MONITORING CONTINUOUS      X-Ray Chest 1 View   Final Result      No acute cardiopulmonary disease and no significant interval change         Electronically signed by: Enzo Nichols MD   Date:    04/25/2024   Time:    16:24          Treatments you received were:   Medications   sodium chloride 0.9% bolus 1,000 mL 1,000 mL (1,000 mLs Intravenous New Bag 4/25/24 1922)   potassium bicarbonate disintegrating tablet 50 mEq (50 mEq Oral Given 4/25/24 1842)       Home Care Instructions:  - Medications: Continue taking your home medications as prescribed  -you can try over-the-counter Zyrtec for your allergic symptoms  There was a lot of earwax in your ears. We recommend Debrox which can be purchased over the counter at your nearest pharmacy or grocery store.  Please use as instructed.  Please refrain from using Q-tips or any blunt instruments to remove wax from the ear canal. You should see your primary care doctor in the next 4-5 days to have your ears checked. Please discontinue use of this product if you experience any pain or discomfort.           Follow-Up Plan:  - Follow-up with: Primary care doctor within 1-2  days  - Additional testing and/or evaluation will be directed by your primary doctor    Return to the Emergency Department for  symptoms including but not limited to: worsening symptoms, severe back pain, shortness of breath or chest pain, vomiting with inability to hold down fluids, blood in vomit or poop, fevers greater than 100.4°F, passing out/fainting/unconsciousness, or other concerning symptoms.     No future appointments.

## 2024-04-26 NOTE — ED NOTES
Report received, care assumed, AOX3, IV started, fluids infusing as ordered, no complaints voiced, no distress noted,

## 2024-06-25 ENCOUNTER — OFFICE VISIT (OUTPATIENT)
Dept: OTOLARYNGOLOGY | Facility: CLINIC | Age: 64
End: 2024-06-25
Payer: MEDICARE

## 2024-06-25 VITALS
WEIGHT: 207 LBS | HEIGHT: 65 IN | HEART RATE: 69 BPM | BODY MASS INDEX: 34.49 KG/M2 | DIASTOLIC BLOOD PRESSURE: 80 MMHG | SYSTOLIC BLOOD PRESSURE: 121 MMHG

## 2024-06-25 DIAGNOSIS — J30.2 SEASONAL ALLERGIES: Primary | ICD-10-CM

## 2024-06-25 PROCEDURE — 99203 OFFICE O/P NEW LOW 30 MIN: CPT | Mod: S$GLB,,, | Performed by: STUDENT IN AN ORGANIZED HEALTH CARE EDUCATION/TRAINING PROGRAM

## 2024-06-25 PROCEDURE — 1126F AMNT PAIN NOTED NONE PRSNT: CPT | Mod: CPTII,S$GLB,, | Performed by: STUDENT IN AN ORGANIZED HEALTH CARE EDUCATION/TRAINING PROGRAM

## 2024-06-25 PROCEDURE — 3079F DIAST BP 80-89 MM HG: CPT | Mod: CPTII,S$GLB,, | Performed by: STUDENT IN AN ORGANIZED HEALTH CARE EDUCATION/TRAINING PROGRAM

## 2024-06-25 PROCEDURE — 99999 PR PBB SHADOW E&M-EST. PATIENT-LVL IV: CPT | Mod: PBBFAC,,, | Performed by: STUDENT IN AN ORGANIZED HEALTH CARE EDUCATION/TRAINING PROGRAM

## 2024-06-25 PROCEDURE — 3008F BODY MASS INDEX DOCD: CPT | Mod: CPTII,S$GLB,, | Performed by: STUDENT IN AN ORGANIZED HEALTH CARE EDUCATION/TRAINING PROGRAM

## 2024-06-25 PROCEDURE — 3074F SYST BP LT 130 MM HG: CPT | Mod: CPTII,S$GLB,, | Performed by: STUDENT IN AN ORGANIZED HEALTH CARE EDUCATION/TRAINING PROGRAM

## 2024-06-25 PROCEDURE — 1159F MED LIST DOCD IN RCRD: CPT | Mod: CPTII,S$GLB,, | Performed by: STUDENT IN AN ORGANIZED HEALTH CARE EDUCATION/TRAINING PROGRAM

## 2024-06-25 RX ORDER — AZELASTINE 1 MG/ML
1 SPRAY, METERED NASAL 2 TIMES DAILY
Qty: 30 ML | Refills: 6 | Status: SHIPPED | OUTPATIENT
Start: 2024-06-25 | End: 2025-06-25

## 2024-06-25 NOTE — PROGRESS NOTES
"  Otolaryngology - Head and Neck Surgery New Patient Visit    6/25/2024    Referring Provider: Floridalma Charles MD    No chief complaint on file.      History of Present Illness, Otolaryngology Specialty-Specific Exam, and Assessment and Plan:     Magalys Bass is a 63 y.o. female who presents for evaluation of "her allergies", which has been present for an unknown number of years. She complains of feeling light headed when exposed to allergies. She reports that her light headedness has improved. She denies purulent nasal drainage, vision changes, anosmia, epistaxis, or facial pressure. She has been treated with floanse in the past. She has never had allergy testing. She denies previous skullbase surgery. She denies snoring. The assessment of quality and severity of symptoms as measured by the SNOT-22 score is deferred.    On exam today, the ears are normal. The oral cavity is clear. The neck is clear. The nasopharynx, hypopharynx, and larynx are normal. Anterior rhinoscopy reveals hypertrophic inferior turbinates bilaterally with edematous boggy mucosa bilaterally.     Impression today is allergic rhinitis . I have recommended that she start on internasal astelin.Referral to allergy for further workup and management.      Thank you for allowing us to participate in the care of your patient. We will continue to keep you informed of her progress.    Sincerely yours,    Phu Elkins MD      Objective     Physical Examination  Vitals -  height is 5' 5" (1.651 m) and weight is 93.9 kg (207 lb 0.2 oz). Her blood pressure is 121/80 and her pulse is 69.   Constitutional - General appearance: Normal. Ability to communicate: Normal.  Head & Face - Overall appearance, scars, masses: Normal. Palpation &/or percussion of face: Normal. Salivary glands: Normal. Facial strength: Normal  ENMT - Otoscopic exam: Normal. Assessment of hearing: Normal. External inspection: Normal. Nasal mucosa, septum, turbinates: Abnormal see " "exam details. Lips, teeth, gums: Normal. Oropharynx: Normal. Pharyngeal walls/pyriform sinus: Normal. Larynx: Normal. Nasopharynx: Normal  Neck - Neck: Normal. Thyroid: Normal  Lymphatic - Palpation of lymph nodes: Normal  Eyes - Ocular mobility: Normal  Respiratory - Inspection of Chest: Normal  Cardiovascular - Peripheral vascular system: Normal  Neurological/Psychiatric - Orientation: Normal    Review of Systems  ROS     A complete review of systems was obtained 06/25/2024 and reviewed.  The review of systems is negative for symptoms except as described above.    /80 (BP Location: Left arm, Patient Position: Sitting, BP Method: Large (Automatic))   Pulse 69   Ht 5' 5" (1.651 m)   Wt 93.9 kg (207 lb 0.2 oz)   LMP 08/01/2014   BMI 34.45 kg/m²      Data Reviewed    WBC (K/uL)   Date Value   04/25/2024 3.66 (L)     Eosinophil % (%)   Date Value   04/25/2024 2.7     Eos # (K/uL)   Date Value   04/25/2024 0.1     Platelets (K/uL)   Date Value   04/25/2024 176     Glucose (mg/dL)   Date Value   04/25/2024 80     No results found for: "IGE"    No sinus imaging available.    "

## 2024-07-30 ENCOUNTER — TELEPHONE (OUTPATIENT)
Dept: ALLERGY | Facility: CLINIC | Age: 64
End: 2024-07-30
Payer: MEDICARE

## 2025-07-17 NOTE — NURSING TRANSFER
Nursing Transfer Note      8/13/2022     Reason patient is being transferred:  Step down    Transfer From: ICU    Transfer via wheelchair    Transfer with cardiac monitoring    Transported by RN    Medicines sent: none    Any special needs or follow-up needed: external pacer    Chart send with patient: Yes    Notified: daughter at bedside    Patient reassessed at: 8/13/2022  1700    Upon arrival to floor: cardiac monitor applied, patient oriented to room, call bell in reach and bed in lowest position, chest tube attached to suction.  Dressings clean, dry and intact  
  Nursing Transfer Note      8/13/2022     Reason patient is being transferred: step-down from ICU    Transfer to CSU room 323 from SICU room [id number]    Transfer via wheelchair    Transfer with all patient belongings, patient chart, connected to telemetry monitor, chest tubes in place. Orders to transfer patient with epicardial ventricular wires in place and connected to pacemaker, pt is not currently paced and HR is 70-80, pacemaker settings order placed by Dr. Raya.     Transported by myself, sister accompanying patient    Chart send with patient: Yes    Notified: Primary receiving nurseAshley    Upon arrival to floor: Pt stable and primary nurse Ashley present in room upon arrival to CSU  
99
98

## (undated) DEVICE — SOL NS 1000CC

## (undated) DEVICE — SUT PROLENE 3-0 SH DA 36 BL

## (undated) DEVICE — R CATH ACUSON ACUNAV 8FR

## (undated) DEVICE — SHEATH HEMOSTASIS 8.5FR

## (undated) DEVICE — CATH TACTICATH ABLAT BIDIR F-J

## (undated) DEVICE — TOWEL OR XRAY WHITE 17X26IN

## (undated) DEVICE — SUT SILK BLK BR. 2 2-60

## (undated) DEVICE — KIT PROBE COVER WITH GEL

## (undated) DEVICE — DRAPE CVMAX SPLIT ANES SCRN

## (undated) DEVICE — INTRODUCER HEMOSTASIS 7.5F

## (undated) DEVICE — INTRO AGILIS MED CRL 8.5F 71CM

## (undated) DEVICE — SUT ETHIBOND EXCEL 2-0 SH-2

## (undated) DEVICE — DRAIN CHANNEL ROUND 19FR

## (undated) DEVICE — BRA POST SURGICAL WHT 48-50IN

## (undated) DEVICE — SOL 9P NACL IRR PIC IL

## (undated) DEVICE — SUT 2/0 30IN ETHIBOND

## (undated) DEVICE — CONTAINER SPECIMEN STRL 4OZ

## (undated) DEVICE — BLADE SAW STERNAL 5/BX

## (undated) DEVICE — SUT ETHIBOND XTRA 2-0 SH-2

## (undated) DEVICE — SET TUBING COOL POINT IRR

## (undated) DEVICE — LOOP VESSEL BLUE MAXI

## (undated) DEVICE — TAPE SURG MEDIPORE 6X72IN

## (undated) DEVICE — TRAY HEART OMC

## (undated) DEVICE — PROBE CRYO ABLATION 10CM

## (undated) DEVICE — CATH RADIAL TIG 6FR 4.0 110CM

## (undated) DEVICE — NDL 22GA X1 1/2 REG BEVEL

## (undated) DEVICE — SUT 2-0 VICRYL / CT-1

## (undated) DEVICE — SUT 6 18IN STEEL MONO CCS

## (undated) DEVICE — BOWL STERILE LARGE 32OZ

## (undated) DEVICE — SPONGE GAUZE 16PLY 4X4

## (undated) DEVICE — OMNIPAQUE 350 200ML

## (undated) DEVICE — BOWL FLUID - BACK STOP

## (undated) DEVICE — KIT ENSITE ELECTRODE SURFACE

## (undated) DEVICE — TIP YANKAUERS BULB NO VENT

## (undated) DEVICE — ELECTRODE REM PLYHSV RETURN 9

## (undated) DEVICE — FOGERTY SOFT JAW DISP 2/PK

## (undated) DEVICE — SOL NACL 0.9% INJ 500ML BG

## (undated) DEVICE — COVER SET UP STRL 54X54 20/BX

## (undated) DEVICE — ELECTRODE PAD DEFIB STERILE

## (undated) DEVICE — CANNULA RETROGRADE CARDIOPLEG

## (undated) DEVICE — DRESSING ADH ISLAND 3.6 X 14

## (undated) DEVICE — RELOAD GREEN FOR ECHELON

## (undated) DEVICE — GLOVE BIOGEL PI MICRO SZ 7.5

## (undated) DEVICE — DRESSING AQUACEL SACRAL 9 X 9

## (undated) DEVICE — TRAY CATH FOL SIL URIMTR 16FR

## (undated) DEVICE — DRAPE SLUSH WARMER WITH DISC

## (undated) DEVICE — COVER TABLE 44X90 STERILE

## (undated) DEVICE — KIT CUSTOM MANIFOLD

## (undated) DEVICE — KIT SAHARA DRAPE DRAW/LIFT

## (undated) DEVICE — CLAMP LONG JAW ISOLATOR OPEN

## (undated) DEVICE — SYR 30CC LUER LOCK

## (undated) DEVICE — CANNULA 29/29FR VACUUM ASSIST

## (undated) DEVICE — SEE MEDLINE ITEM 156894

## (undated) DEVICE — RETRACTOR OCTOBASE INSERT HOLD

## (undated) DEVICE — SUT 3-0 CTD VICRYL 27IN PS

## (undated) DEVICE — SET DECANTER MEDICHOICE

## (undated) DEVICE — PROTECTION STATION PLUS

## (undated) DEVICE — INTRODUCER TRNSRADIAL 6F 10CM

## (undated) DEVICE — HEMOSTAT VASC BAND REG 24CM

## (undated) DEVICE — STAPLER ECHELON FLEX 45MM 34CM

## (undated) DEVICE — SEE MEDLINE ITEM 157187

## (undated) DEVICE — R CATH BIDIRECTIONL DF CRV 7FR

## (undated) DEVICE — SUT SILK 2-0 SH 18IN BLACK

## (undated) DEVICE — Device

## (undated) DEVICE — CANNULA AORTIC ROOT 12 GAUGE 9

## (undated) DEVICE — SUT PROLENE 4-0 RB-1 BL MO

## (undated) DEVICE — DRESSING MEPILEX BORDR AG 4X12

## (undated) DEVICE — NDL TRNSSPTL BRK-1 18GA 98CM

## (undated) DEVICE — DRAIN CHEST DRY SUCTION

## (undated) DEVICE — CLOSURE SKIN STERI STRIP 1/2X4

## (undated) DEVICE — SUT PROLENE 4-0 SH BLU 36IN

## (undated) DEVICE — KIT URINARY CATH URINE METER

## (undated) DEVICE — PAD DEFIB CADENCE ADULT R2

## (undated) DEVICE — PACK EP DRAPE OMC

## (undated) DEVICE — SUT 2/0 36IN COATED VICRYL

## (undated) DEVICE — GAUZE SPONGE 4X4 12PLY

## (undated) DEVICE — CATH MAP BI-D HD SENSOR ENABLE

## (undated) DEVICE — SUT VICRYL PLUS 3-0 FS1 27

## (undated) DEVICE — WIRE GUIDE SAFE-T-J .035 260CM

## (undated) DEVICE — STOPCOCK 3-WAY

## (undated) DEVICE — SUT VICRYL BR 1 GEN 27 CT-1

## (undated) DEVICE — PAD GROUND UNIV STYLE CORD 9IN

## (undated) DEVICE — SUT PROLENE 5-0 BL C-1 4-24

## (undated) DEVICE — BLADE STERN 65.8MM

## (undated) DEVICE — INSERTS STEALTH FIBRA SIZE 5

## (undated) DEVICE — SPIKE CONTRAST CONTROLLER

## (undated) DEVICE — CANNULA MULTIPLE PERFUSIONSET

## (undated) DEVICE — DRAPE INCISE IOBAN 2 23X17IN

## (undated) DEVICE — SUT PROLENE 5-0 24 C-1 BL